# Patient Record
Sex: FEMALE | Race: WHITE | NOT HISPANIC OR LATINO | Employment: UNEMPLOYED | URBAN - METROPOLITAN AREA
[De-identification: names, ages, dates, MRNs, and addresses within clinical notes are randomized per-mention and may not be internally consistent; named-entity substitution may affect disease eponyms.]

---

## 2017-01-17 ENCOUNTER — OFFICE VISIT (OUTPATIENT)
Dept: HEMATOLOGY/ONCOLOGY | Facility: CLINIC | Age: 82
End: 2017-01-17
Payer: MEDICARE

## 2017-01-17 ENCOUNTER — LAB VISIT (OUTPATIENT)
Dept: LAB | Facility: HOSPITAL | Age: 82
End: 2017-01-17
Attending: INTERNAL MEDICINE
Payer: MEDICARE

## 2017-01-17 VITALS
SYSTOLIC BLOOD PRESSURE: 130 MMHG | HEART RATE: 63 BPM | BODY MASS INDEX: 22.1 KG/M2 | DIASTOLIC BLOOD PRESSURE: 51 MMHG | RESPIRATION RATE: 18 BRPM | HEIGHT: 61 IN | WEIGHT: 117.06 LBS | TEMPERATURE: 98 F

## 2017-01-17 DIAGNOSIS — D63.1 ANEMIA IN CKD (CHRONIC KIDNEY DISEASE): ICD-10-CM

## 2017-01-17 DIAGNOSIS — I10 ESSENTIAL HYPERTENSION: ICD-10-CM

## 2017-01-17 DIAGNOSIS — D63.8 ANEMIA OF CHRONIC DISEASE: ICD-10-CM

## 2017-01-17 DIAGNOSIS — N18.9 ANEMIA IN CKD (CHRONIC KIDNEY DISEASE): ICD-10-CM

## 2017-01-17 DIAGNOSIS — D63.8 ANEMIA OF OTHER CHRONIC DISEASE: Primary | ICD-10-CM

## 2017-01-17 DIAGNOSIS — I25.9 ISCHEMIC HEART DISEASE DUE TO CORONARY ARTERY OBSTRUCTION: ICD-10-CM

## 2017-01-17 DIAGNOSIS — Z98.890 STATUS POST CAROTID ENDARTERECTOMY: ICD-10-CM

## 2017-01-17 DIAGNOSIS — I24.0 ISCHEMIC HEART DISEASE DUE TO CORONARY ARTERY OBSTRUCTION: ICD-10-CM

## 2017-01-17 LAB
ALBUMIN SERPL BCP-MCNC: 3.8 G/DL
ALP SERPL-CCNC: 82 U/L
ALT SERPL W/O P-5'-P-CCNC: <5 U/L
ANION GAP SERPL CALC-SCNC: 10 MMOL/L
AST SERPL-CCNC: 11 U/L
BASOPHILS # BLD AUTO: 0.03 K/UL
BASOPHILS NFR BLD: 0.4 %
BILIRUB SERPL-MCNC: 0.3 MG/DL
BUN SERPL-MCNC: 25 MG/DL
CALCIUM SERPL-MCNC: 9.5 MG/DL
CHLORIDE SERPL-SCNC: 102 MMOL/L
CO2 SERPL-SCNC: 28 MMOL/L
CREAT SERPL-MCNC: 1.3 MG/DL
DIFFERENTIAL METHOD: ABNORMAL
EOSINOPHIL # BLD AUTO: 0.4 K/UL
EOSINOPHIL NFR BLD: 5.9 %
ERYTHROCYTE [DISTWIDTH] IN BLOOD BY AUTOMATED COUNT: 13.8 %
EST. GFR  (AFRICAN AMERICAN): 45 ML/MIN/1.73 M^2
EST. GFR  (NON AFRICAN AMERICAN): 39 ML/MIN/1.73 M^2
GLUCOSE SERPL-MCNC: 85 MG/DL
HCT VFR BLD AUTO: 33.5 %
HGB BLD-MCNC: 11.4 G/DL
LYMPHOCYTES # BLD AUTO: 1.5 K/UL
LYMPHOCYTES NFR BLD: 21.5 %
MCH RBC QN AUTO: 29.6 PG
MCHC RBC AUTO-ENTMCNC: 34 %
MCV RBC AUTO: 87 FL
MONOCYTES # BLD AUTO: 0.7 K/UL
MONOCYTES NFR BLD: 9.7 %
NEUTROPHILS # BLD AUTO: 4.3 K/UL
NEUTROPHILS NFR BLD: 62.5 %
PLATELET # BLD AUTO: 326 K/UL
PMV BLD AUTO: 9.7 FL
POTASSIUM SERPL-SCNC: 4.1 MMOL/L
PROT SERPL-MCNC: 7.4 G/DL
RBC # BLD AUTO: 3.85 M/UL
SODIUM SERPL-SCNC: 140 MMOL/L
WBC # BLD AUTO: 6.8 K/UL

## 2017-01-17 PROCEDURE — 99213 OFFICE O/P EST LOW 20 MIN: CPT | Mod: S$PBB,,, | Performed by: INTERNAL MEDICINE

## 2017-01-17 PROCEDURE — 99999 PR PBB SHADOW E&M-EST. PATIENT-LVL III: CPT | Mod: PBBFAC,,, | Performed by: INTERNAL MEDICINE

## 2017-01-17 PROCEDURE — 99213 OFFICE O/P EST LOW 20 MIN: CPT | Mod: PBBFAC,PN | Performed by: INTERNAL MEDICINE

## 2017-01-17 RX ORDER — DICLOFENAC SODIUM 50 MG/1
50 TABLET, DELAYED RELEASE ORAL 2 TIMES DAILY PRN
Qty: 30 TABLET | Refills: 0 | Status: SHIPPED | OUTPATIENT
Start: 2017-01-17 | End: 2017-07-18 | Stop reason: SDUPTHER

## 2017-01-17 RX ORDER — DICLOFENAC SODIUM 50 MG/1
50 TABLET, DELAYED RELEASE ORAL 2 TIMES DAILY PRN
Qty: 30 TABLET | Refills: 2 | Status: SHIPPED | OUTPATIENT
Start: 2017-01-17 | End: 2017-07-18

## 2017-01-19 NOTE — PROGRESS NOTES
HISTORY OF PRESENT ILLNESS:  This is a 80-year-old  woman coming in for   followup.  The patient has anemia of chronic disease.  She has had   postmenopausal bleeding.  Endometrial biopsies were negative for malignancy.    She is no longer having any bleeding.  She continues to see Dr. Recinos for her   CAD and venous Doppler ultrasound scheduled by him for pain and discomfort in   the legs.  The patient states she is tired all night long because she stays   awake with her , who coughs a lot and she also has restless nights and   tends to stick on the bed long during the day.  The patient is small built,   chronically ill-appearing, with anemia of chronic disease, here for followup.of labs,  has not improved, she has DJD pain that is debilitating but she dosent want any rx    PHYSICAL EXAMINATION:  Wt Readings from Last 3 Encounters:   01/17/17 53.1 kg (117 lb 1 oz)   08/23/16 54.4 kg (120 lb)   08/11/16 54.4 kg (120 lb)     Temp Readings from Last 3 Encounters:   01/17/17 98.1 °F (36.7 °C) (Oral)   08/23/16 98.2 °F (36.8 °C) (Skin)   07/15/16 98 °F (36.7 °C) (Oral)     BP Readings from Last 3 Encounters:   01/17/17 (!) 130/51   08/23/16 140/63   08/11/16 (!) 124/56     Pulse Readings from Last 3 Encounters:   01/17/17 63   08/23/16 (!) 58   08/11/16 62     GENERAL:  Chronically ill-appearing woman.  Ambulating using a walker.    Comfortable looking patient.  Patient is in no distress.  Awake, alert and   oriented to time, person and place.  No anxiety, or agitation.  HEENT:  Normal   conjunctivae and eyelids.  WNL.  PERRLA 3 to 4 mm.  No icterus, no pallor, no   congestion, and no discharge noted.   NECK:  Supple.  Trachea is central.  No crepitus.  No JVD or masses.  RESPIRATORY:  No intercostal retractions.  No dullness to percussion.  Chest is   clear to auscultation.  No rales, rhonchi or wheezes.  No crepitus.  Good air   entry bilaterally.  CARDIOVASCULAR:  S1 and S2 are normally heard  without murmurs or gallops.  All   peripheral pulses are present.  ABDOMEN:  Normal abdomen.  No hepatosplenomegaly.  No free fluid.  Bowel sounds   are present.  No hernia noted.  No masses.  No rebound or tenderness.  No   guarding or rigidity.  Umbilicus is midline.  NEUROLOGIC:  Higher functions are appropriate.  No cranial nerve deficits.    Normal gait.  Normal strength.  Motor and sensory functions are normal.   .    LABORATORY DATA:    Lab Results   Component Value Date    WBC 6.80 01/17/2017    HGB 11.4 (L) 01/17/2017    HCT 33.5 (L) 01/17/2017    MCV 87 01/17/2017     01/17/2017     CMP  Sodium   Date Value Ref Range Status   01/17/2017 140 136 - 145 mmol/L Final     Potassium   Date Value Ref Range Status   01/17/2017 4.1 3.5 - 5.1 mmol/L Final     Chloride   Date Value Ref Range Status   01/17/2017 102 95 - 110 mmol/L Final     CO2   Date Value Ref Range Status   01/17/2017 28 23 - 29 mmol/L Final     Glucose   Date Value Ref Range Status   01/17/2017 85 70 - 110 mg/dL Final     BUN, Bld   Date Value Ref Range Status   01/17/2017 25 (H) 8 - 23 mg/dL Final     Creatinine   Date Value Ref Range Status   01/17/2017 1.3 0.5 - 1.4 mg/dL Final     Calcium   Date Value Ref Range Status   01/17/2017 9.5 8.7 - 10.5 mg/dL Final     Total Protein   Date Value Ref Range Status   01/17/2017 7.4 6.0 - 8.4 g/dL Final     Albumin   Date Value Ref Range Status   01/17/2017 3.8 3.5 - 5.2 g/dL Final     Total Bilirubin   Date Value Ref Range Status   01/17/2017 0.3 0.1 - 1.0 mg/dL Final     Comment:     For infants and newborns, interpretation of results should be based  on gestational age, weight and in agreement with clinical  observations.  Premature Infant recommended reference ranges:  Up to 24 hours.............<8.0 mg/dL  Up to 48 hours............<12.0 mg/dL  3-5 days..................<15.0 mg/dL  6-29 days.................<15.0 mg/dL       Alkaline Phosphatase   Date Value Ref Range Status   01/17/2017 82  55 - 135 U/L Final     AST   Date Value Ref Range Status   01/17/2017 11 10 - 40 U/L Final     ALT   Date Value Ref Range Status   01/17/2017 <5 (L) 10 - 44 U/L Final     Anion Gap   Date Value Ref Range Status   01/17/2017 10 8 - 16 mmol/L Final     eGFR if    Date Value Ref Range Status   01/17/2017 45 (A) >60 mL/min/1.73 m^2 Final     eGFR if non    Date Value Ref Range Status   01/17/2017 39 (A) >60 mL/min/1.73 m^2 Final     Comment:     Calculation used to obtain the estimated glomerular filtration  rate (eGFR) is the CKD-EPI equation. Since race is unknown   in our information system, the eGFR values for   -American and Non--American patients are given   for each creatinine result.       PLAN:  Continue to monitor,hgb stable.    Rest of anemia workup has been   negative so far and only a bone marrow has not been performed yet.  We will   continue to observe only as the numbers are improving.  Return to clinic in five   to six months with CBC and CMP.

## 2017-01-30 RX ORDER — DEXLANSOPRAZOLE 30 MG/1
CAPSULE, DELAYED RELEASE ORAL
Qty: 30 CAPSULE | Refills: 6 | Status: SHIPPED | OUTPATIENT
Start: 2017-01-30 | End: 2017-04-12 | Stop reason: SDUPTHER

## 2017-02-20 DIAGNOSIS — H91.93 BILATERAL HEARING LOSS, UNSPECIFIED HEARING LOSS TYPE: Primary | ICD-10-CM

## 2017-02-21 ENCOUNTER — OFFICE VISIT (OUTPATIENT)
Dept: OTOLARYNGOLOGY | Facility: CLINIC | Age: 82
End: 2017-02-21
Payer: MEDICARE

## 2017-02-21 ENCOUNTER — CLINICAL SUPPORT (OUTPATIENT)
Dept: AUDIOLOGY | Facility: CLINIC | Age: 82
End: 2017-02-21
Payer: MEDICARE

## 2017-02-21 VITALS
DIASTOLIC BLOOD PRESSURE: 48 MMHG | BODY MASS INDEX: 22.1 KG/M2 | HEART RATE: 56 BPM | SYSTOLIC BLOOD PRESSURE: 138 MMHG | HEIGHT: 61 IN | WEIGHT: 117.06 LBS

## 2017-02-21 DIAGNOSIS — H93.13 TINNITUS, BILATERAL: ICD-10-CM

## 2017-02-21 DIAGNOSIS — H90.3 BILATERAL SENSORINEURAL HEARING LOSS: Primary | ICD-10-CM

## 2017-02-21 DIAGNOSIS — H90.3 BILATERAL HIGH FREQUENCY SENSORINEURAL HEARING LOSS: Primary | ICD-10-CM

## 2017-02-21 PROCEDURE — 92557 COMPREHENSIVE HEARING TEST: CPT | Mod: PBBFAC,PO | Performed by: AUDIOLOGIST-HEARING AID FITTER

## 2017-02-21 PROCEDURE — 99213 OFFICE O/P EST LOW 20 MIN: CPT | Mod: PBBFAC,PO | Performed by: NURSE PRACTITIONER

## 2017-02-21 PROCEDURE — 99213 OFFICE O/P EST LOW 20 MIN: CPT | Mod: S$PBB,,, | Performed by: NURSE PRACTITIONER

## 2017-02-21 PROCEDURE — 92567 TYMPANOMETRY: CPT | Mod: PBBFAC,PO | Performed by: AUDIOLOGIST-HEARING AID FITTER

## 2017-02-21 PROCEDURE — 99999 PR PBB SHADOW E&M-EST. PATIENT-LVL III: CPT | Mod: PBBFAC,,, | Performed by: NURSE PRACTITIONER

## 2017-02-21 NOTE — PROGRESS NOTES
Audiological evaluation completed per order from Diana Bess due to pt c/o hearing loss x2 yrs. Pt also reported a family Hx of hearing loss as her mother wore hearing aids. Pt also has occasional tinnitus AU. Results today indicate bilateral moderate sloping to severe SNHL with good discrim AU. Bilateral type A (normal) tymps were obtained.    Audiogram results were reviewed in detail with patient and all questions were answered. Recommend binaural amplification pending medical clearance, annual hearing tests to monitor hearing loss and hearing protection in loud noise. Pt was given information for hearing aid assistance.

## 2017-02-21 NOTE — PROGRESS NOTES
Subjective:       Patient ID: Sobeida Mcclain is a 81 y.o. female.    Chief Complaint: Hearing Loss    HPI   Patient reports decline in hearing over the past 2 years.  Occasional tinnitus.  Her mother wore hearing aids.  No significant noise exposure.  No otologic history of surgery or trauma.  No other ENT symptoms or concerns at this time.    Review of Systems   Constitutional: Negative.    HENT: Positive for hearing loss and tinnitus.    Eyes: Negative.    Respiratory: Negative.    Cardiovascular: Negative.    Gastrointestinal: Negative.    Musculoskeletal: Negative.    Skin: Negative.    Neurological: Negative.    Hematological: Negative.    Psychiatric/Behavioral: Negative.        Objective:      Physical Exam   Constitutional: She is oriented to person, place, and time. Vital signs are normal. She appears well-developed and well-nourished. She is cooperative. She does not appear ill. No distress.   HENT:   Head: Normocephalic and atraumatic.   Right Ear: Hearing, tympanic membrane, external ear and ear canal normal. Tympanic membrane is not erythematous. No middle ear effusion.   Left Ear: Hearing, tympanic membrane, external ear and ear canal normal. Tympanic membrane is not erythematous.  No middle ear effusion.   Nose: Nose normal. No mucosal edema or rhinorrhea. Right sinus exhibits no maxillary sinus tenderness and no frontal sinus tenderness. Left sinus exhibits no maxillary sinus tenderness and no frontal sinus tenderness.   Mouth/Throat: Uvula is midline, oropharynx is clear and moist and mucous membranes are normal. Mucous membranes are not pale, not dry and not cyanotic. She has dentures. No oral lesions. No oropharyngeal exudate, posterior oropharyngeal edema or posterior oropharyngeal erythema.   Eyes: Conjunctivae, EOM and lids are normal. Pupils are equal, round, and reactive to light. Right eye exhibits no discharge. Left eye exhibits no discharge. No scleral icterus.   Neck: Trachea normal and  normal range of motion. Neck supple. No tracheal deviation present. No thyroid mass and no thyromegaly present.   Cardiovascular: Normal rate.    Pulmonary/Chest: Effort normal. No stridor. No respiratory distress. She has no wheezes.   Musculoskeletal: Normal range of motion.   Lymphadenopathy:        Head (right side): No submental, no submandibular, no tonsillar, no preauricular and no posterior auricular adenopathy present.        Head (left side): No submental, no submandibular, no tonsillar, no preauricular and no posterior auricular adenopathy present.     She has no cervical adenopathy.        Right cervical: No superficial cervical and no posterior cervical adenopathy present.       Left cervical: No superficial cervical and no posterior cervical adenopathy present.   Neurological: She is alert and oriented to person, place, and time. She has normal strength. Coordination and gait normal.   Skin: Skin is warm, dry and intact. No lesion and no rash noted. She is not diaphoretic. No cyanosis. No pallor.   Psychiatric: She has a normal mood and affect. Her speech is normal and behavior is normal. Judgment and thought content normal. Cognition and memory are normal.   Nursing note and vitals reviewed.      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   Today's audiogram reveals the patient is a candidate for amplification. Audiogram is reviewed in detail with the patient. The audiologist's recommendation that the patient have amplification/hearing aids is discussed and questions answered. Patient has been given information by the audiologist on how to schedule a hearing aid consultation. Patient is encouraged to wear ear protection in loud noise and return annually for hearing test. Return to clinic as needed for further ENT concerns.

## 2017-03-26 RX ORDER — SERTRALINE HYDROCHLORIDE 50 MG/1
TABLET, FILM COATED ORAL
Qty: 30 TABLET | Refills: 5 | Status: SHIPPED | OUTPATIENT
Start: 2017-03-26 | End: 2017-09-09 | Stop reason: SDUPTHER

## 2017-04-12 DIAGNOSIS — K21.9 GASTROESOPHAGEAL REFLUX DISEASE, ESOPHAGITIS PRESENCE NOT SPECIFIED: Primary | ICD-10-CM

## 2017-04-13 RX ORDER — DEXLANSOPRAZOLE 30 MG/1
30 CAPSULE, DELAYED RELEASE ORAL
Qty: 90 CAPSULE | Refills: 2 | Status: SHIPPED | OUTPATIENT
Start: 2017-04-13 | End: 2017-04-20 | Stop reason: SDUPTHER

## 2017-04-20 DIAGNOSIS — K21.9 GASTROESOPHAGEAL REFLUX DISEASE, ESOPHAGITIS PRESENCE NOT SPECIFIED: ICD-10-CM

## 2017-04-21 RX ORDER — DEXLANSOPRAZOLE 30 MG/1
30 CAPSULE, DELAYED RELEASE ORAL
Qty: 90 CAPSULE | Refills: 2 | Status: SHIPPED | OUTPATIENT
Start: 2017-04-21 | End: 2018-02-21 | Stop reason: SDUPTHER

## 2017-04-21 RX ORDER — FERROUS SULFATE 325(65) MG
TABLET ORAL
Qty: 30 TABLET | Refills: 3 | Status: SHIPPED | OUTPATIENT
Start: 2017-04-21 | End: 2017-07-18 | Stop reason: SDUPTHER

## 2017-05-22 ENCOUNTER — NURSE TRIAGE (OUTPATIENT)
Dept: ADMINISTRATIVE | Facility: CLINIC | Age: 82
End: 2017-05-22

## 2017-05-22 NOTE — TELEPHONE ENCOUNTER
Reason for Disposition   MILD difficulty breathing (e.g., minimal/no SOB at rest, SOB with walking, pulse < 100) of new onset or worse than normal    Protocols used: ST BREATHING DIFFICULTY-A-OH    Call transferred.  Pt calling with concerns of shortness of breath, legs and back pain.  Attempt to schedule appointment today, no appointments available.  Pt advised to go to the ED.

## 2017-06-02 ENCOUNTER — TELEPHONE (OUTPATIENT)
Dept: FAMILY MEDICINE | Facility: CLINIC | Age: 82
End: 2017-06-02

## 2017-06-02 NOTE — TELEPHONE ENCOUNTER
----- Message from Shivani Dunn sent at 6/2/2017  3:17 PM CDT -----  Contact: [facility]  Patient is requesting Lidocaine ointment, 5% prescription to go to [facility]. Please call [facility] at [phone]

## 2017-06-02 NOTE — TELEPHONE ENCOUNTER
Called pharmacy and jacey states that pt was offered a knee brace through their company and the also offered pt lidocaine ointment. Jacey states she will fax a form. Informed her that pt has never been on that medication

## 2017-06-27 ENCOUNTER — PATIENT OUTREACH (OUTPATIENT)
Dept: ADMINISTRATIVE | Facility: HOSPITAL | Age: 82
End: 2017-06-27

## 2017-06-27 ENCOUNTER — TELEPHONE (OUTPATIENT)
Dept: HEMATOLOGY/ONCOLOGY | Facility: CLINIC | Age: 82
End: 2017-06-27

## 2017-06-27 NOTE — PROGRESS NOTES
HTN registry, over 1 year.  Called pt to inform:    Due for your fasting cholesterol labs, osteoporosis screening, and possibly some immunizations (pneumonia, shingles, and tetanus)

## 2017-06-27 NOTE — LETTER
July 5, 2017    Sobeida Mcclain  [id number] [address] [address]             [facility]  [address]  [facility] [address]  Phone: [phone] Dear Mrs. Mcclain:    We have tried to reach you by mychart unsuccessfully.    [facility] is committed to your overall health.  To help you get the most out of each of your visits, we will review your information to make sure you are up to date on all of your recommended tests and/or procedures.       We have Dr. Mal Bernstein listed as your primary care provider.  He has found that you may be due for your fasting cholesterol labs, osteoporosis screening, and possibly some immunizations (pneumonia, shingles, and tetanus).     If you have had any of the above done at an outside facility, please let us know so I can update your record.  If you have a copy of these records, please provide a copy for us to scan into your chart.  If not, please provide that provider/facilities contact information so that we may obtain copies from that facility.     Otherwise, please schedule these appointments at your earliest convenience.  You are due for your annual follow up with Dr. Bernstein before August of 2017.     If you have any questions or concerns, please don't hesitate to call.    Thank you for letting us care for you,  Rebecca Choudhary LPN Clinical Care Coordinator  [facility]  [phone]

## 2017-06-27 NOTE — TELEPHONE ENCOUNTER
----- Message from Jacey Dixon sent at 6/27/2017  2:53 PM CDT -----  Contact: daughter-Purvi Long  Needs to reschedule 7/18 lab and dr ocampo to Wednesday 7/19.  Please call back at

## 2017-07-10 ENCOUNTER — TELEPHONE (OUTPATIENT)
Dept: HEMATOLOGY/ONCOLOGY | Facility: CLINIC | Age: 82
End: 2017-07-10

## 2017-07-10 NOTE — TELEPHONE ENCOUNTER
Returned call to daughter, [place], who wanted to know why her mother's lab and follow up on 7/18/17. In further discussion, there apparently was some miscommunication during the last phone conversation with another staff nurse and the appointments were cancelled. Rescheduled lab and follow up on 7/18/17 as before, lab at 9:30 am and follow up at 10:20 am as another daughter had rearranged her schedule to bring patient to those appointments. Daughter, Virginia, voiced understanding and appreciation.

## 2017-07-10 NOTE — TELEPHONE ENCOUNTER
----- Message from Jayson MOTA Frisard sent at 7/10/2017  8:49 AM CDT -----  Contact: Ml/Purvi Loja called in regarding the attached patient (mom-Sobeida) and wanted to know why the appts on Tuesday 7/18/17 were cancelled for the patient? Virginia would like a call back at [phone]

## 2017-07-18 ENCOUNTER — TELEPHONE (OUTPATIENT)
Dept: HEMATOLOGY/ONCOLOGY | Facility: CLINIC | Age: 82
End: 2017-07-18

## 2017-07-18 ENCOUNTER — LAB VISIT (OUTPATIENT)
Dept: LAB | Facility: HOSPITAL | Age: 82
End: 2017-07-18
Attending: INTERNAL MEDICINE
Payer: MEDICARE

## 2017-07-18 ENCOUNTER — OFFICE VISIT (OUTPATIENT)
Dept: HEMATOLOGY/ONCOLOGY | Facility: CLINIC | Age: 82
End: 2017-07-18
Payer: MEDICARE

## 2017-07-18 VITALS
HEART RATE: 59 BPM | WEIGHT: 115.5 LBS | TEMPERATURE: 98 F | SYSTOLIC BLOOD PRESSURE: 127 MMHG | RESPIRATION RATE: 16 BRPM | HEIGHT: 61 IN | BODY MASS INDEX: 21.81 KG/M2 | DIASTOLIC BLOOD PRESSURE: 55 MMHG

## 2017-07-18 DIAGNOSIS — D63.1 ANEMIA IN STAGE 3 CHRONIC KIDNEY DISEASE: Primary | ICD-10-CM

## 2017-07-18 DIAGNOSIS — N18.30 ANEMIA IN STAGE 3 CHRONIC KIDNEY DISEASE: Primary | ICD-10-CM

## 2017-07-18 DIAGNOSIS — D51.8 OTHER VITAMIN B12 DEFICIENCY ANEMIA: ICD-10-CM

## 2017-07-18 DIAGNOSIS — D63.8 ANEMIA OF OTHER CHRONIC DISEASE: ICD-10-CM

## 2017-07-18 DIAGNOSIS — I10 ESSENTIAL HYPERTENSION: ICD-10-CM

## 2017-07-18 LAB
ALBUMIN SERPL BCP-MCNC: 4.3 G/DL
ALP SERPL-CCNC: 90 U/L
ALT SERPL W/O P-5'-P-CCNC: 6 U/L
ANION GAP SERPL CALC-SCNC: 9 MMOL/L
AST SERPL-CCNC: 16 U/L
BASOPHILS # BLD AUTO: 0.04 K/UL
BASOPHILS NFR BLD: 0.6 %
BILIRUB SERPL-MCNC: 0.6 MG/DL
BUN SERPL-MCNC: 22 MG/DL
CALCIUM SERPL-MCNC: 9.8 MG/DL
CHLORIDE SERPL-SCNC: 103 MMOL/L
CO2 SERPL-SCNC: 28 MMOL/L
CREAT SERPL-MCNC: 1.23 MG/DL
DIFFERENTIAL METHOD: ABNORMAL
EOSINOPHIL # BLD AUTO: 0.5 K/UL
EOSINOPHIL NFR BLD: 6.6 %
ERYTHROCYTE [DISTWIDTH] IN BLOOD BY AUTOMATED COUNT: 13.4 %
EST. GFR  (AFRICAN AMERICAN): 48 ML/MIN/1.73 M^2
EST. GFR  (NON AFRICAN AMERICAN): 42 ML/MIN/1.73 M^2
GLUCOSE SERPL-MCNC: 98 MG/DL
HCT VFR BLD AUTO: 34 %
HGB BLD-MCNC: 11.2 G/DL
LYMPHOCYTES # BLD AUTO: 1.7 K/UL
LYMPHOCYTES NFR BLD: 24.5 %
MCH RBC QN AUTO: 30.6 PG
MCHC RBC AUTO-ENTMCNC: 32.9 %
MCV RBC AUTO: 93 FL
MONOCYTES # BLD AUTO: 0.6 K/UL
MONOCYTES NFR BLD: 8.7 %
NEUTROPHILS # BLD AUTO: 4.1 K/UL
NEUTROPHILS NFR BLD: 59.6 %
NRBC BLD-RTO: 0 /100 WBC
PLATELET # BLD AUTO: 288 K/UL
PMV BLD AUTO: 9.6 FL
POTASSIUM SERPL-SCNC: 3.9 MMOL/L
PROT SERPL-MCNC: 7.7 G/DL
RBC # BLD AUTO: 3.66 M/UL
SODIUM SERPL-SCNC: 140 MMOL/L
WBC # BLD AUTO: 6.87 K/UL

## 2017-07-18 PROCEDURE — 99999 PR PBB SHADOW E&M-EST. PATIENT-LVL III: CPT | Mod: PBBFAC,,, | Performed by: INTERNAL MEDICINE

## 2017-07-18 PROCEDURE — 80053 COMPREHEN METABOLIC PANEL: CPT

## 2017-07-18 PROCEDURE — 99213 OFFICE O/P EST LOW 20 MIN: CPT | Mod: PBBFAC,PN | Performed by: INTERNAL MEDICINE

## 2017-07-18 PROCEDURE — 99214 OFFICE O/P EST MOD 30 MIN: CPT | Mod: S$PBB,,, | Performed by: INTERNAL MEDICINE

## 2017-07-18 PROCEDURE — 1159F MED LIST DOCD IN RCRD: CPT | Mod: ,,, | Performed by: INTERNAL MEDICINE

## 2017-07-18 PROCEDURE — 85025 COMPLETE CBC W/AUTO DIFF WBC: CPT

## 2017-07-18 PROCEDURE — 1125F AMNT PAIN NOTED PAIN PRSNT: CPT | Mod: ,,, | Performed by: INTERNAL MEDICINE

## 2017-07-18 NOTE — PROGRESS NOTES
HISTORY OF PRESENT ILLNESS:  This is a 80-year-old  woman coming in for   followup.  The patient has anemia of chronic disease.  She has had   postmenopausal bleeding.  Endometrial biopsies were negative for malignancy.    She is no longer having any bleeding.  She continues to see Dr. Recinos for her CAD.    The patient states she is tired all night long because she stays   awake with her , who coughs a lot and she also has restless nights and   tends to stick on the bed long during the day.  The patient is small built,   chronically ill-appearing, with anemia of chronic disease, here for followup.of labs, had stents in her heart and leg by Dr. Recinos, pt is feeling much more active, she has DJD pain that is debilitating but she dosent want any rx.       PHYSICAL EXAMINATION:  Wt Readings from Last 3 Encounters:   02/21/17 53.1 kg (117 lb 1 oz)   01/17/17 53.1 kg (117 lb 1 oz)   08/23/16 54.4 kg (120 lb)     Temp Readings from Last 3 Encounters:   01/17/17 98.1 °F (36.7 °C) (Oral)   08/23/16 98.2 °F (36.8 °C) (Skin)   07/15/16 98 °F (36.7 °C) (Oral)     BP Readings from Last 3 Encounters:   02/21/17 (!) 138/48   01/17/17 (!) 130/51   08/23/16 140/63     Pulse Readings from Last 3 Encounters:   02/21/17 (!) 56   01/17/17 63   08/23/16 (!) 58     GENERAL:  Chronically ill-appearing woman.  Ambulating using a walker.    Comfortable looking patient.  Patient is in no distress.  Awake, alert and   oriented to time, person and place.  No anxiety, or agitation.  HEENT:  Normal   conjunctivae and eyelids.  WNL.  PERRLA 3 to 4 mm.  No icterus, no pallor, no   congestion, and no discharge noted.   NECK:  Supple.  Trachea is central.  No crepitus.  No JVD or masses.  RESPIRATORY:  No intercostal retractions.  No dullness to percussion.  Chest is   clear to auscultation.  No rales, rhonchi or wheezes.  No crepitus.  Good air   entry bilaterally.  CARDIOVASCULAR:  S1 and S2 are normally heard without murmurs or  gallops.  All   peripheral pulses are present.  ABDOMEN:  Normal abdomen.  No hepatosplenomegaly.  No free fluid.  Bowel sounds   are present.  No hernia noted.  No masses.  No rebound or tenderness.  No   guarding or rigidity.  Umbilicus is midline.  NEUROLOGIC:  Higher functions are appropriate.  No cranial nerve deficits.    Normal gait.  Normal strength.  Motor and sensory functions are normal.   .    LABORATORY DATA:    Lab Results   Component Value Date    WBC 6.87 07/18/2017    HGB 11.2 (L) 07/18/2017    HCT 34.0 (L) 07/18/2017    MCV 93 07/18/2017     07/18/2017     CMP  Sodium   Date Value Ref Range Status   07/18/2017 140 136 - 145 mmol/L Final     Potassium   Date Value Ref Range Status   07/18/2017 3.9 3.5 - 5.1 mmol/L Final     Chloride   Date Value Ref Range Status   07/18/2017 103 95 - 110 mmol/L Final     CO2   Date Value Ref Range Status   07/18/2017 28 22 - 31 mmol/L Final     Glucose   Date Value Ref Range Status   07/18/2017 98 70 - 110 mg/dL Final     Comment:     The ADA recommends the following guidelines for fasting glucose:  Normal:       less than 100 mg/dL  Prediabetes:  100 mg/dL to 125 mg/dL  Diabetes:     126 mg/dL or higher       BUN, Bld   Date Value Ref Range Status   07/18/2017 22 (H) 7 - 18 mg/dL Final     Creatinine   Date Value Ref Range Status   07/18/2017 1.23 0.50 - 1.40 mg/dL Final     Calcium   Date Value Ref Range Status   07/18/2017 9.8 8.4 - 10.2 mg/dL Final     Total Protein   Date Value Ref Range Status   07/18/2017 7.7 6.0 - 8.4 g/dL Final     Albumin   Date Value Ref Range Status   07/18/2017 4.3 3.5 - 5.2 g/dL Final     Total Bilirubin   Date Value Ref Range Status   07/18/2017 0.6 0.2 - 1.3 mg/dL Final     Comment:     For infants and newborns, interpretation of results should be based  on gestational age, weight and in agreement with clinical  observations.  Premature Infant recommended reference ranges:  Up to 24 hours.............<8.0 mg/dL  Up to 48  hours............<12.0 mg/dL  3-5 days..................<15.0 mg/dL  6-29 days.................<15.0 mg/dL       Alkaline Phosphatase   Date Value Ref Range Status   07/18/2017 90 38 - 145 U/L Final     AST   Date Value Ref Range Status   07/18/2017 16 14 - 36 U/L Final     ALT   Date Value Ref Range Status   07/18/2017 6 (L) 10 - 44 U/L Final     Anion Gap   Date Value Ref Range Status   07/18/2017 9 8 - 16 mmol/L Final     eGFR if    Date Value Ref Range Status   07/18/2017 48 (A) >60 mL/min/1.73 m^2 Final     eGFR if non    Date Value Ref Range Status   07/18/2017 42 (A) >60 mL/min/1.73 m^2 Final     Comment:     Calculation used to obtain the estimated glomerular filtration  rate (eGFR) is the CKD-EPI equation. Since race is unknown   in our information system, the eGFR values for   -American and Non--American patients are given   for each creatinine result.       PLAN:  Continue to monitor,hgb stable.    Rest of anemia workup has been   negative so far and only a bone marrow has not been performed yet.  We will   continue to observe only as the numbers are improving.  Return to clinic in five   to six months with CBC and CMP.  Mild CKD :stable rtc 6-8 months again for clinical f/u

## 2017-07-18 NOTE — TELEPHONE ENCOUNTER
----- Message from Rhina Neri sent at 7/18/2017  3:07 PM CDT -----  Contact: Daughter  Felecia, daughter [phone], Calling to discuss the referral for , sleeping issues, so patient will be able to sleep at night. Please advise. thanks.

## 2017-07-18 NOTE — TELEPHONE ENCOUNTER
Returned call to patient's daughter, Felecia, who stated that Dr. Steven offered to give referral for patient's  to see someone as  does not sleep at night, therefore keeps patient up at night and patient does not get any sleep. Daughter would like Dr. Steven's recommendation.  (Manny Mcclain  1934) has appointment with neurologist on 2017, Dr. Healy at 1 pm. I strongly urged Felecia to keep that appointment and will call her back if Dr. Steven has any further recommendation. Felecia voiced understanding and appreciation.

## 2017-07-25 ENCOUNTER — TELEPHONE (OUTPATIENT)
Dept: FAMILY MEDICINE | Facility: CLINIC | Age: 82
End: 2017-07-25

## 2017-07-25 DIAGNOSIS — I10 ESSENTIAL HYPERTENSION: Primary | ICD-10-CM

## 2017-07-25 NOTE — TELEPHONE ENCOUNTER
Please review pt's hx and advise what lab she may be due for. Will call dtr back to sched prior to appt.

## 2017-07-25 NOTE — TELEPHONE ENCOUNTER
----- Message from Jim Truong sent at 7/25/2017  1:32 PM CDT -----  Contact: [place] patient's daughter  [place] patient's daughter called with questions regarding labs before the visit? Please call back at [phone]. Thanks,

## 2017-07-27 NOTE — TELEPHONE ENCOUNTER
Spoke with Virginia (anne) and informed her that lipid orders have been put in and that pt needs to be fasting.

## 2017-08-01 ENCOUNTER — LAB VISIT (OUTPATIENT)
Dept: LAB | Facility: HOSPITAL | Age: 82
End: 2017-08-01
Attending: FAMILY MEDICINE
Payer: MEDICARE

## 2017-08-01 DIAGNOSIS — I10 ESSENTIAL HYPERTENSION: ICD-10-CM

## 2017-08-01 LAB
CHOLEST/HDLC SERPL: 5.4 {RATIO}
HDL/CHOLESTEROL RATIO: 18.5 %
HDLC SERPL-MCNC: 205 MG/DL
HDLC SERPL-MCNC: 38 MG/DL
LDLC SERPL CALC-MCNC: 141.4 MG/DL
NONHDLC SERPL-MCNC: 167 MG/DL
TRIGL SERPL-MCNC: 128 MG/DL

## 2017-08-01 PROCEDURE — 36415 COLL VENOUS BLD VENIPUNCTURE: CPT | Mod: PO

## 2017-08-01 PROCEDURE — 80061 LIPID PANEL: CPT

## 2017-08-09 ENCOUNTER — OFFICE VISIT (OUTPATIENT)
Dept: FAMILY MEDICINE | Facility: CLINIC | Age: 82
End: 2017-08-09
Payer: MEDICARE

## 2017-08-09 VITALS
SYSTOLIC BLOOD PRESSURE: 120 MMHG | HEIGHT: 61 IN | TEMPERATURE: 99 F | BODY MASS INDEX: 21.91 KG/M2 | WEIGHT: 116.06 LBS | DIASTOLIC BLOOD PRESSURE: 72 MMHG

## 2017-08-09 DIAGNOSIS — I24.0 ISCHEMIC HEART DISEASE DUE TO CORONARY ARTERY OBSTRUCTION: ICD-10-CM

## 2017-08-09 DIAGNOSIS — D63.8 ANEMIA, CHRONIC DISEASE: ICD-10-CM

## 2017-08-09 DIAGNOSIS — I10 ESSENTIAL HYPERTENSION: Primary | ICD-10-CM

## 2017-08-09 DIAGNOSIS — I25.9 ISCHEMIC HEART DISEASE DUE TO CORONARY ARTERY OBSTRUCTION: ICD-10-CM

## 2017-08-09 DIAGNOSIS — I73.9 PERIPHERAL VASCULAR DISEASE: ICD-10-CM

## 2017-08-09 DIAGNOSIS — R63.4 WEIGHT LOSS: ICD-10-CM

## 2017-08-09 PROCEDURE — 3008F BODY MASS INDEX DOCD: CPT | Mod: ,,, | Performed by: FAMILY MEDICINE

## 2017-08-09 PROCEDURE — 99999 PR PBB SHADOW E&M-EST. PATIENT-LVL II: CPT | Mod: PBBFAC,,, | Performed by: FAMILY MEDICINE

## 2017-08-09 PROCEDURE — 3074F SYST BP LT 130 MM HG: CPT | Mod: ,,, | Performed by: FAMILY MEDICINE

## 2017-08-09 PROCEDURE — 3078F DIAST BP <80 MM HG: CPT | Mod: ,,, | Performed by: FAMILY MEDICINE

## 2017-08-09 PROCEDURE — 1159F MED LIST DOCD IN RCRD: CPT | Mod: ,,, | Performed by: FAMILY MEDICINE

## 2017-08-09 PROCEDURE — 1125F AMNT PAIN NOTED PAIN PRSNT: CPT | Mod: ,,, | Performed by: FAMILY MEDICINE

## 2017-08-09 PROCEDURE — 99212 OFFICE O/P EST SF 10 MIN: CPT | Mod: PBBFAC,PO | Performed by: FAMILY MEDICINE

## 2017-08-09 PROCEDURE — 99214 OFFICE O/P EST MOD 30 MIN: CPT | Mod: S$PBB,,, | Performed by: FAMILY MEDICINE

## 2017-08-09 NOTE — PROGRESS NOTES
Subjective:       Patient ID: Sobeida Mcclain is a 82 y.o. female.    Chief Complaint: Knee Pain (L knee pain. Advised by a [employer] company to see PCP for knee brace eval.)    Mrs Mcclain, 81y/o WF, PMH of CKD, Peripheral vascular disease, CAD s/p stents, anemia of chronic disease, shoulder pain, HTN presents with physical check up b/c she received a message from [facility] which required her to see a doctor before a certain date.     CAD/PVD - denies changes in chest pain, SOB, leg pain from baseline. Compliant with aspirin and plavix. No side effects.   Reports that U/S of leg in July from outside hospital was clear of clots.     Anemia of chronic disease - labs shows chronic anemia. She denies baseline changes in fatigue. Has been compliant with iron and cyanocolabamin. No side effects. She is being followed by dermatology.    HTN - well controlled, 120/72. On Dyazide, no side effects.     Reports weight loss. Records show 4 pounds from her maximum of 120lbs to 116. Denies excessive gas, blood in stool, bowel changes, abdominal pain. Denies hematemesis. Occasional coughing during interview.     Her daughter was present. They report that her  is on a wheel chair and she is overwhelmed with stress taking care of him.    Offered to check her labs and further workup if her symptoms continue. She refuses.       Knee Pain        Review of Systems   Constitutional: Positive for fatigue and unexpected weight change. Negative for appetite change, chills, diaphoresis and fever.   HENT: Negative for congestion, ear discharge, facial swelling and hearing loss.    Eyes: Negative for discharge and redness.   Respiratory: Negative for apnea, choking, chest tightness, shortness of breath, wheezing and stridor.    Cardiovascular: Negative for chest pain and palpitations.   Gastrointestinal: Negative for abdominal distention, abdominal pain, constipation, diarrhea, nausea, rectal pain and vomiting.   Endocrine: Negative for cold  "intolerance, heat intolerance and polydipsia.   Genitourinary: Negative for difficulty urinating and dyspareunia.   Musculoskeletal: Positive for arthralgias, back pain, myalgias and neck stiffness.   Skin: Positive for rash.   Allergic/Immunologic: Negative.    Neurological: Negative for facial asymmetry.   Psychiatric/Behavioral: Negative for agitation and behavioral problems. The patient is not hyperactive.        Objective:     Blood pressure 120/72, temperature 98.5 °F (36.9 °C), temperature source Oral, height 5' 1" (1.549 m), weight 52.7 kg (116 lb 1.2 oz).      Physical Exam   Constitutional: She is oriented to person, place, and time.   Cardiovascular: Normal rate, regular rhythm and normal heart sounds.  Exam reveals no gallop and no friction rub.    No murmur heard.  Andreas negative   Pulmonary/Chest: Effort normal and breath sounds normal. No respiratory distress. She has no wheezes. She has no rales. She exhibits no tenderness.   Abdominal: Soft. Bowel sounds are normal.   Neurological: She is alert and oriented to person, place, and time. No cranial nerve deficit.   Gait slow, required some assistance from daughter to get on examination bed  Not shuffling, no tremors.   Skin: Skin is warm and dry. Rash noted. Rash is macular.        Psychiatric:   Required occasional redirection. Mild tangentiality and loss of focus.       Assessment:       1. Essential hypertension    2. 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 Peripheral vascular disease    4. Anemia, chronic disease    5. Weight loss        Plan:       1. Well controlled on Dyazide 37.5-25. Continue.     2,3. Nonsymptomatic and compliant on aspirin/plavix, Continue.  4. No change in symptoms from baseline, no evidence of acute bleeding. Continue Iron and Cyanocobalamin.   5. Mild. Consider neoplastic etiology if it continues.   "

## 2017-09-10 RX ORDER — SERTRALINE HYDROCHLORIDE 50 MG/1
TABLET, FILM COATED ORAL
Qty: 30 TABLET | Refills: 5 | Status: SHIPPED | OUTPATIENT
Start: 2017-09-10 | End: 2018-04-02 | Stop reason: SDUPTHER

## 2017-09-11 RX ORDER — FERROUS SULFATE 325(65) MG
TABLET ORAL
Qty: 30 TABLET | Refills: 3 | Status: SHIPPED | OUTPATIENT
Start: 2017-09-11 | End: 2017-11-08 | Stop reason: SDUPTHER

## 2017-11-08 ENCOUNTER — OFFICE VISIT (OUTPATIENT)
Dept: FAMILY MEDICINE | Facility: CLINIC | Age: 82
End: 2017-11-08
Payer: MEDICARE

## 2017-11-08 ENCOUNTER — TELEPHONE (OUTPATIENT)
Dept: FAMILY MEDICINE | Facility: CLINIC | Age: 82
End: 2017-11-08

## 2017-11-08 VITALS
DIASTOLIC BLOOD PRESSURE: 60 MMHG | SYSTOLIC BLOOD PRESSURE: 102 MMHG | HEIGHT: 61 IN | HEART RATE: 64 BPM | TEMPERATURE: 98 F | WEIGHT: 114 LBS | BODY MASS INDEX: 21.52 KG/M2

## 2017-11-08 DIAGNOSIS — M25.569 KNEE PAIN, UNSPECIFIED CHRONICITY, UNSPECIFIED LATERALITY: ICD-10-CM

## 2017-11-08 DIAGNOSIS — M25.562 CHRONIC PAIN OF LEFT KNEE: ICD-10-CM

## 2017-11-08 DIAGNOSIS — R26.9 ABNORMALITY OF GAIT: Primary | ICD-10-CM

## 2017-11-08 DIAGNOSIS — J06.9 UPPER RESPIRATORY TRACT INFECTION, UNSPECIFIED TYPE: ICD-10-CM

## 2017-11-08 DIAGNOSIS — G89.29 CHRONIC PAIN OF LEFT KNEE: ICD-10-CM

## 2017-11-08 DIAGNOSIS — R26.9 ABNORMALITY OF GAIT AND MOBILITY: ICD-10-CM

## 2017-11-08 DIAGNOSIS — R51.9 NONINTRACTABLE HEADACHE, UNSPECIFIED CHRONICITY PATTERN, UNSPECIFIED HEADACHE TYPE: Primary | ICD-10-CM

## 2017-11-08 PROCEDURE — 99213 OFFICE O/P EST LOW 20 MIN: CPT | Mod: PBBFAC,PN | Performed by: INTERNAL MEDICINE

## 2017-11-08 PROCEDURE — 99999 PR PBB SHADOW E&M-EST. PATIENT-LVL III: CPT | Mod: PBBFAC,,, | Performed by: INTERNAL MEDICINE

## 2017-11-08 PROCEDURE — 99214 OFFICE O/P EST MOD 30 MIN: CPT | Mod: S$PBB,,, | Performed by: INTERNAL MEDICINE

## 2017-11-08 NOTE — PROGRESS NOTES
"Assessment and Plan:    1. Nonintractable headache, unspecified chronicity pattern, unspecified headache type  Suspect that with other symptoms of cough, sore throat, and ear pressure and popping sensation (no signs of ear infections), this headache is related to a viral syndrome. No focal neuro concerns. Recommended tylenol and OTC supportive measures for URI.     2. Upper respiratory tract infection, unspecified type    3. Knee pain, unspecified chronicity, unspecified laterality  Known arthritis failed many treatments in the past. Recommended tylenol, ice, using the knee brace.    4. Abnormality of gait and mobility  Patient concerned about falls, does not think current walked is adequate. Will refer to PT for gait eval for safety and recommendations. Advised her to bring brace and walked to appointment.  - Ambulatory Referral to Physical/Occupational Therapy        ______________________________________________________________________  Subjective:    Chief Complaint:  Headaches, knee pain    HPI:  Sobeida is a 82 y.o. year old woman here for evaluation of headaches.     She notes that she has been having intermittent headaches and hearing a popping sensation behind her right ear. This has been going on for about a week. She has tried taking 1 ibuprofen OTC sporadically (less than once a day). She also notes ear pain on the left side that started around the same time. She had been having sporadic cough and sore throat along with this. She denies fevers and chills.      She also reports knee pain. She denies having seen anyone about this before, but per chart review, knee arthritis is an ongoing problem for her. She has tried glucocorticoid as well as Synvisc injections. She has a knee brace which she is not using today. She notes that she is concerned about falling due to her knee  "giving out" from pain, so she has not been walking long distances. She has a 4-wheeled walker which she uses at home. "     Medications:  Current Outpatient Prescriptions on File Prior to Visit   Medication Sig Dispense Refill    aspirin (ECOTRIN) 81 MG EC tablet Take 81 mg by mouth once daily.      clopidogrel (PLAVIX) 75 mg tablet Take 75 mg by mouth once daily.       cyanocobalamin 500 MCG tablet Take 1,000 mcg by mouth once daily.       dexlansoprazole (DEXILANT) 30 mg CpDM Take 1 capsule (30 mg total) by mouth before breakfast. 90 capsule 2    ferrous sulfate 325 mg (65 mg iron) Tab tablet TAKE 1 TABLET (325 MG TOTAL) BY MOUTH ONCE DAILY. 30 tablet 3    sertraline (ZOLOFT) 50 MG tablet TAKE 1 TABLET EVERY DAY 30 tablet 5    triamterene-hydrochlorothiazide 37.5-25 mg (DYAZIDE) 37.5-25 mg per capsule Take 1 capsule by mouth every morning.      [DISCONTINUED] ferrous sulfate 325 mg (65 mg iron) Tab tablet TAKE 1 TABLET (325 MG TOTAL) BY MOUTH ONCE DAILY. 30 tablet 3     No current facility-administered medications on file prior to visit.        Review of Systems:  Review of Systems   Constitutional: Negative for chills and fever.   HENT: Positive for congestion, ear pain, postnasal drip, sinus pressure and sore throat. Negative for ear discharge, sinus pain and sneezing.    Eyes: Negative for visual disturbance.   Respiratory: Positive for cough. Negative for shortness of breath and wheezing.    Cardiovascular: Negative for chest pain.   Musculoskeletal: Positive for arthralgias, gait problem and joint swelling.   Neurological: Positive for headaches. Negative for dizziness, weakness and numbness.   Psychiatric/Behavioral: The patient is nervous/anxious.        Past Medical History:  Past Medical History:   Diagnosis Date    Anticoagulant long-term use     Carotid artery disease     Coronary artery disease     chest discomfort, does not take NTG    Encounter for blood transfusion     Hypertension        Objective:    Vitals:  Vitals:    11/08/17 0814   BP: 102/60   Pulse: 64   Temp: 98.4 °F (36.9 °C)   Weight: 51.7 kg  "(113 lb 15.7 oz)   Height: 5' 1" (1.549 m)   PainSc:   4   PainLoc: Leg       Physical Exam   Constitutional: She is oriented to person, place, and time. No distress.   HENT:   Right Ear: No mastoid tenderness. Tympanic membrane is erythematous. Tympanic membrane is not injected, not retracted and not bulging. No middle ear effusion.   Left Ear: No mastoid tenderness. Tympanic membrane is erythematous. Tympanic membrane is not injected, not retracted and not bulging.  No middle ear effusion.   Mouth/Throat: Posterior oropharyngeal erythema present. No oropharyngeal exudate or posterior oropharyngeal edema. Tonsils are 0 on the right. Tonsils are 0 on the left.   Eyes: No scleral icterus.   Cardiovascular: Normal rate and regular rhythm.    No murmur heard.  Pulmonary/Chest: Effort normal and breath sounds normal. No respiratory distress. She has no wheezes.   Musculoskeletal: She exhibits no edema.   Neurological: She is alert and oriented to person, place, and time.   Skin: Skin is warm and dry. She is not diaphoretic.   Psychiatric: She has a normal mood and affect. Her behavior is normal.   Vitals reviewed.      Data:  Previous labs reviewed and pertinent for decreased GFR.      Holly Interiano MD  Internal Medicine  "

## 2017-11-08 NOTE — TELEPHONE ENCOUNTER
----- Message from Mary Jane Pappas sent at 11/8/2017  2:39 PM CST -----  Contact: margo ivey   Please call daughter on cell      Use this number only to call back     Regarding PT

## 2017-11-08 NOTE — PATIENT INSTRUCTIONS
For your knee pain and headaches, take TYLENOL (ACETAMINOPHEN) rather than ibuprofen. Medications like ibuprofen, naproxen, advil, and aleve, are not safe to take regularly when you have kidney disease.     You have an upper respiratory infection, also known as a cold. Based on my exam today, I do not think that you need antibiotics, and I do not think that they would help you. This type of infection commonly causes runny nose, nasal congestion, sinus pressure, ear pressure, sore throat, and cough.     If you are having sore throat, you can try gargling with salt water. Heat water to dissolve salt to the point where you cant dissolve any more. Cool the water back to lukewarm and gargle with this solution. This can help with swelling in your throat.    For your trouble walking, I have put in a referral to physical therapy.

## 2017-11-08 NOTE — TELEPHONE ENCOUNTER
Daughter states that pt husbands uses [facility], and would like to use them. Pt saw dr. koehler 11-8-17.

## 2017-11-08 NOTE — TELEPHONE ENCOUNTER
----- Message from Giovanna Herrera sent at 11/8/2017  1:47 PM CST -----  Please call patient in regards to requesting home health order for PT in home, [phone] ([place])

## 2018-01-08 RX ORDER — FERROUS SULFATE 325(65) MG
TABLET ORAL
Qty: 30 TABLET | Refills: 3 | Status: SHIPPED | OUTPATIENT
Start: 2018-01-08 | End: 2018-06-20 | Stop reason: SDUPTHER

## 2018-02-01 ENCOUNTER — HOSPITAL ENCOUNTER (OUTPATIENT)
Dept: RADIOLOGY | Facility: HOSPITAL | Age: 83
Discharge: HOME | End: 2018-02-01
Attending: NURSE PRACTITIONER
Payer: MEDICARE

## 2018-02-01 ENCOUNTER — OFFICE VISIT (OUTPATIENT)
Dept: PRIMARY CARE CLINIC | Facility: CLINIC | Age: 83
End: 2018-02-01
Payer: MEDICARE

## 2018-02-01 VITALS
BODY MASS INDEX: 21.61 KG/M2 | WEIGHT: 114.44 LBS | SYSTOLIC BLOOD PRESSURE: 106 MMHG | HEART RATE: 66 BPM | HEIGHT: 61 IN | OXYGEN SATURATION: 94 % | DIASTOLIC BLOOD PRESSURE: 52 MMHG | TEMPERATURE: 98 F

## 2018-02-01 DIAGNOSIS — R05.9 COUGH: ICD-10-CM

## 2018-02-01 DIAGNOSIS — R42 DIZZINESS: ICD-10-CM

## 2018-02-01 DIAGNOSIS — J06.9 URI, ACUTE: Primary | ICD-10-CM

## 2018-02-01 PROCEDURE — 71046 X-RAY EXAM CHEST 2 VIEWS: CPT | Mod: TC,FY,PO

## 2018-02-01 PROCEDURE — 99214 OFFICE O/P EST MOD 30 MIN: CPT | Mod: S$PBB,,, | Performed by: NURSE PRACTITIONER

## 2018-02-01 PROCEDURE — 1125F AMNT PAIN NOTED PAIN PRSNT: CPT | Mod: ,,, | Performed by: NURSE PRACTITIONER

## 2018-02-01 PROCEDURE — 1159F MED LIST DOCD IN RCRD: CPT | Mod: ,,, | Performed by: NURSE PRACTITIONER

## 2018-02-01 PROCEDURE — 71046 X-RAY EXAM CHEST 2 VIEWS: CPT | Mod: 26,,, | Performed by: RADIOLOGY

## 2018-02-01 PROCEDURE — 99999 PR PBB SHADOW E&M-EST. PATIENT-LVL IV: CPT | Mod: PBBFAC,,, | Performed by: NURSE PRACTITIONER

## 2018-02-01 PROCEDURE — 99214 OFFICE O/P EST MOD 30 MIN: CPT | Mod: PBBFAC,PO | Performed by: NURSE PRACTITIONER

## 2018-02-01 NOTE — PROGRESS NOTES
"Subjective:       Patient ID: Sobeida Mcclain is a 82 y.o. female.    Chief Complaint: Headache; Dizziness; and Back Pain    Patient who is new to me presents with a new problem of cough, dizziness, and headache. She states she hears a popping noise in the back of her head that is associated with the headache. She also has been having episodes of dizziness that occurs when standing up but that has been occurring for "a long time." Patient does endorse multiple stressors at home from a spouse whom is disabled from a stroke. She has a daughter and son who help with the care of her .       Cough   This is a new problem. The current episode started in the past 7 days (patient is unsure). The problem has been unchanged. The cough is non-productive. Associated symptoms include ear pain, headaches, myalgias and rhinorrhea. Pertinent negatives include no chest pain, chills, fever, heartburn, hemoptysis, nasal congestion, postnasal drip, rash, sore throat, shortness of breath, sweats, weight loss or wheezing. Associated symptoms comments: dizziness. Nothing aggravates the symptoms. She has tried nothing for the symptoms. There is no history of asthma, bronchitis, environmental allergies or pneumonia.     Review of Systems   Constitutional: Negative for chills, fatigue, fever and weight loss.   HENT: Positive for ear pain and rhinorrhea. Negative for congestion, postnasal drip, sinus pain, sinus pressure, sneezing and sore throat.    Respiratory: Positive for cough. Negative for hemoptysis, chest tightness, shortness of breath and wheezing.    Cardiovascular: Negative for chest pain, palpitations and leg swelling.   Gastrointestinal: Negative for abdominal distention, abdominal pain, constipation, diarrhea, heartburn, nausea and vomiting.   Genitourinary: Negative for decreased urine volume, difficulty urinating, dysuria, frequency and urgency.   Musculoskeletal: Positive for myalgias. Negative for arthralgias, gait " problem and joint swelling.   Skin: Negative for rash and wound.   Allergic/Immunologic: Negative for environmental allergies.   Neurological: Positive for dizziness and headaches. Negative for light-headedness and numbness.       Objective:      Physical Exam   Constitutional: She is oriented to person, place, and time. She appears well-developed and well-nourished.   Elderly patient who presents in rollator   HENT:   Head: Normocephalic and atraumatic.   Right Ear: Hearing, tympanic membrane, external ear and ear canal normal.   Left Ear: Hearing, tympanic membrane, external ear and ear canal normal.   Nose: Nose normal. Right sinus exhibits no maxillary sinus tenderness and no frontal sinus tenderness. Left sinus exhibits no maxillary sinus tenderness and no frontal sinus tenderness.   Mouth/Throat: Oropharynx is clear and moist.   Eyes: Pupils are equal, round, and reactive to light.   Neck: Normal range of motion.   Cardiovascular: Normal rate, regular rhythm, normal heart sounds and intact distal pulses.    Pulmonary/Chest: Effort normal and breath sounds normal. No respiratory distress. She has no wheezes.   Abdominal: Soft. Bowel sounds are normal.   Musculoskeletal: Normal range of motion.   Neurological: She is alert and oriented to person, place, and time.   Skin: Skin is warm and dry.   Nursing note and vitals reviewed.      Assessment:       1. URI, acute    2. Cough    3. Dizziness        Plan:       URI, acute    Cough  -     CBC auto differential; Future; Expected date: 02/01/2018  -     X-Ray Chest PA And Lateral; Future; Expected date: 02/01/2018    Dizziness  -     Basic metabolic panel; Future; Expected date: 02/01/2018  -     CBC auto differential; Future; Expected date: 02/01/2018      Discussed with patient OTCs. Dizziness may be due to mild dehydration, instructed patient to increase fluids. Patient to follow up if no improvement or worsening symptoms.

## 2018-02-01 NOTE — PATIENT INSTRUCTIONS
Adult Self-Care for Colds  Colds are caused by viruses. They can't be cured with antibiotics. However, you can ease symptoms and support your body's efforts to heal itself.  No matter which symptoms you have, be sure to:  · Drink plenty of fluids (water or clear soup)  · Stop smoking and drinking alcohol  · Get plenty of rest    Understand a fever  · Take your temperature several times a day. If your fever is 100.4°F (38.0°C) for more than a day, call your healthcare provider.  · Relax, lie down. Go to bed if you want. Just get off your feet and rest. Also, drink plenty of fluids to avoid dehydration.  · Take acetaminophen or a nonsteroidal anti-inflammatory agent (NSAID), such as ibuprofen.  Treat a troubled nose kindly  · Breathe steam or heated humidified air to open blocked nasal passages.  a hot shower or use a vaporizer. Be careful not to get burned by the steam.  · Saline nasal sprays and decongestant tablets help open a stuffy nose. Antihistamines can also help, but they can cause side effects such as drowsiness and drying of the eyes, nose, and mouth.  Soothe a sore throat and cough  · Gargle every 2 hours with 1/4 teaspoon of salt dissolved in 1/2 cup of warm water. Suck on throat lozenges and cough drops to moisten your throat.  · Cough medicines are available but it is unclear how well they actually work.  · Take acetaminophen or an NSAID, such as ibuprofen, to ease throat pain  Ease digestive problems  · Put fluids back into your body. Take frequent sips of clear liquids such as water or broth. Avoid drinks that have a lot of sugar in them, such as juices and sodas. These can make diarrhea worse. Older children and adults can drink sports drinks.  · As your appetite returns, you can resume your normal diet. Ask your healthcare provider if there are any foods you should avoid.  When to seek medical care  When you first notice symptoms, ask your healthcare provider if antiviral medicines are  appropriate. Antibiotics should not be taken for colds or flu. Also, call your healthcare provider if you have any of the following symptoms or if you aren't feeling better after 7 days:  · Shortness of breath  · Pain or pressure in the chest or belly (abdomen)  · Worsening symptoms, especially after a period of improvement  · Fever of 100.4°F  (38.0°C) or higher, or fever that doesn't go down with medicine  · Sudden dizziness or confusion  · Severe or continued vomiting  · Signs of dehydration, including extreme thirst, dark urine, infrequent urination, dry mouth  · Spotted, red, or very sore throat   Date Last Reviewed: 12/1/2016 © 2000-2017 [employer]. [address] [place] [address], [place] [address]. All rights reserved. This information is not intended as a substitute for professional medical care. Always follow your healthcare professional's instructions.        Dizziness (Uncertain Cause)  Dizziness is a common symptom. It may be described as lightheadedness, spinning, or feeling like you are going to faint. Dizziness can have many causes.  Be sure to tell the healthcare provider about:  · All medicines you take, including prescription, over-the-counter, herbs, and supplements  · Any other symptoms you have  · Any health problems you are being treated for  · Anything that causes the dizziness to get worse or better  Today's exam did not show an exact cause for your dizziness. Other tests may be needed. Follow up with your healthcare provider.  Home care  · Dizziness that occurs with sudden standing may be a sign of mild dehydration. Drink extra fluids for the next few days.  · If you recently started a new medicine, stopped a medicine, or had the dose of a current medicine changed, talk with the prescribing healthcare provider. Your medicine plan may need adjustment.  · If dizziness lasts more than a few seconds, sit or lie down until it passes. This may help prevent injury in case you pass  out.  · Do not drive or use power tools or dangerous equipment until you have had no dizziness for at least 48 hours.  Follow-up care  Follow up with your healthcare provider for further evaluation within the next 7 days or as advised.  When to seek medical advice  Call your healthcare provider for any of the following:  · Worsening of symptoms or new symptoms  · Passing out or seizure  · Repeated vomiting  · Headache  · Palpitations (the sense that your heart is fluttering or beating fast or hard)  · Shortness of breath  · Blood in vomit or stool (black or red color)  · Weakness of an arm or leg or one side of the face  · Vision or hearing changes  · Trouble walking or speaking  · Chest, arm, neck, back, or jaw pain  Date Last Reviewed: 8/23/2015 © 2000-2017 [employer]. [address] [place] [address], [place] [address]. All rights reserved. This information is not intended as a substitute for professional medical care. Always follow your healthcare professional's instructions.      If you are not better in 3 days, if worse or you have concerns or questions, please do not hesitate to call.  You can reach us at [phone] Monday through Friday (except holidays) 12 nooon to 8 p.m.    Thank you for using the [facility]!    ROYAL Delaney, [facility]  [facility]  [facility]

## 2018-02-19 ENCOUNTER — TELEPHONE (OUTPATIENT)
Dept: FAMILY MEDICINE | Facility: CLINIC | Age: 83
End: 2018-02-19

## 2018-02-19 DIAGNOSIS — R53.81 DEBILITY: Primary | ICD-10-CM

## 2018-02-19 NOTE — TELEPHONE ENCOUNTER
I am attempting to place a referral to HH. Her spouse was just placed on hospice. Upon evaluation, it was determined that she would benefit from HH services for debility. It looks like she had active HH with [facility], but it was not discontinued in the computer. I have given a written order to [facility], but I need to update in Epic. Can you end the active orders in Epic?

## 2018-02-21 ENCOUNTER — TELEPHONE (OUTPATIENT)
Dept: GASTROENTEROLOGY | Facility: CLINIC | Age: 83
End: 2018-02-21

## 2018-02-21 ENCOUNTER — TELEPHONE (OUTPATIENT)
Dept: HEMATOLOGY/ONCOLOGY | Facility: CLINIC | Age: 83
End: 2018-02-21

## 2018-02-21 DIAGNOSIS — K21.9 GASTROESOPHAGEAL REFLUX DISEASE, ESOPHAGITIS PRESENCE NOT SPECIFIED: ICD-10-CM

## 2018-02-21 RX ORDER — DEXLANSOPRAZOLE 30 MG/1
30 CAPSULE, DELAYED RELEASE ORAL
Qty: 90 CAPSULE | Refills: 0 | Status: SHIPPED | OUTPATIENT
Start: 2018-02-21 | End: 2018-05-30 | Stop reason: SDUPTHER

## 2018-02-21 NOTE — TELEPHONE ENCOUNTER
Pt's dghtr notified Dexilant refilled for 90 days will need f/u appt for any further refillls. Verbalizied understanding

## 2018-02-21 NOTE — TELEPHONE ENCOUNTER
Returned Gustavo's call and Gustavo wanted to know if pt had labs to be drawn and I told her yes she has a lab appt already and she said she will let pt know.

## 2018-02-21 NOTE — TELEPHONE ENCOUNTER
Please inform the patient that I refilled the prescription for dexilant and she is due for a follow-up visit (last seen over a year ago) for continued evaluation and management.  Thanks  KIERRA

## 2018-02-21 NOTE — TELEPHONE ENCOUNTER
----- Message from Aiden Salazar sent at 2/21/2018  2:51 PM CST -----  Contact: Gustavo from [facility]  Gustavo from [facility] is wanting to know if pt needs labs before Friday and if so which ones?.. Please give Gustavo a call back at [phone]

## 2018-02-22 ENCOUNTER — TELEPHONE (OUTPATIENT)
Dept: FAMILY MEDICINE | Facility: CLINIC | Age: 83
End: 2018-02-22

## 2018-02-22 ENCOUNTER — LAB VISIT (OUTPATIENT)
Dept: LAB | Facility: HOSPITAL | Age: 83
End: 2018-02-22
Attending: INTERNAL MEDICINE
Payer: MEDICARE

## 2018-02-22 DIAGNOSIS — D51.8 OTHER VITAMIN B12 DEFICIENCY ANEMIA: ICD-10-CM

## 2018-02-22 DIAGNOSIS — N18.30 ANEMIA IN STAGE 3 CHRONIC KIDNEY DISEASE: ICD-10-CM

## 2018-02-22 DIAGNOSIS — D63.1 ANEMIA IN STAGE 3 CHRONIC KIDNEY DISEASE: ICD-10-CM

## 2018-02-22 LAB
ALBUMIN SERPL BCP-MCNC: 4.3 G/DL
ALP SERPL-CCNC: 68 U/L
ALT SERPL W/O P-5'-P-CCNC: 21 U/L
ANION GAP SERPL CALC-SCNC: 12 MMOL/L
AST SERPL-CCNC: 21 U/L
BASOPHILS # BLD AUTO: 0.01 K/UL
BASOPHILS NFR BLD: 0.2 %
BILIRUB SERPL-MCNC: 0.4 MG/DL
BUN SERPL-MCNC: 25 MG/DL
CALCIUM SERPL-MCNC: 9.6 MG/DL
CHLORIDE SERPL-SCNC: 100 MMOL/L
CO2 SERPL-SCNC: 29 MMOL/L
CREAT SERPL-MCNC: 1.25 MG/DL
DIFFERENTIAL METHOD: ABNORMAL
EOSINOPHIL # BLD AUTO: 0.2 K/UL
EOSINOPHIL NFR BLD: 4.3 %
ERYTHROCYTE [DISTWIDTH] IN BLOOD BY AUTOMATED COUNT: 13.2 %
EST. GFR  (AFRICAN AMERICAN): 47 ML/MIN/1.73 M^2
EST. GFR  (NON AFRICAN AMERICAN): 41 ML/MIN/1.73 M^2
GLUCOSE SERPL-MCNC: 108 MG/DL
HCT VFR BLD AUTO: 32.8 %
HGB BLD-MCNC: 10.7 G/DL
LYMPHOCYTES # BLD AUTO: 1.2 K/UL
LYMPHOCYTES NFR BLD: 23.1 %
MCH RBC QN AUTO: 30.6 PG
MCHC RBC AUTO-ENTMCNC: 32.6 G/DL
MCV RBC AUTO: 94 FL
MONOCYTES # BLD AUTO: 0.4 K/UL
MONOCYTES NFR BLD: 6.8 %
NEUTROPHILS # BLD AUTO: 3.4 K/UL
NEUTROPHILS NFR BLD: 65.6 %
NRBC BLD-RTO: 0 /100 WBC
PLATELET # BLD AUTO: 232 K/UL
PMV BLD AUTO: 10.2 FL
POTASSIUM SERPL-SCNC: 4 MMOL/L
PROT SERPL-MCNC: 7.8 G/DL
RBC # BLD AUTO: 3.5 M/UL
SODIUM SERPL-SCNC: 141 MMOL/L
VIT B12 SERPL-MCNC: 483 PG/ML
WBC # BLD AUTO: 5.16 K/UL

## 2018-02-22 PROCEDURE — 85025 COMPLETE CBC W/AUTO DIFF WBC: CPT

## 2018-02-22 PROCEDURE — 82607 VITAMIN B-12: CPT | Mod: PN

## 2018-02-22 PROCEDURE — 85025 COMPLETE CBC W/AUTO DIFF WBC: CPT | Mod: PN

## 2018-02-22 PROCEDURE — 36415 COLL VENOUS BLD VENIPUNCTURE: CPT | Mod: PN

## 2018-02-22 PROCEDURE — 80053 COMPREHEN METABOLIC PANEL: CPT | Mod: PN

## 2018-02-22 PROCEDURE — 80053 COMPREHEN METABOLIC PANEL: CPT

## 2018-02-22 PROCEDURE — 82607 VITAMIN B-12: CPT

## 2018-02-22 NOTE — TELEPHONE ENCOUNTER
Spoke w/ Gustavo. She states she got the orders for home HH written by Claritza. She said the MD show Dr Interiano and Dr Moreno. Advised her that last OV was with Dr Interiano in Nov and she could use Dr Interiano. If she prefer Dr Bernstein, that would be OK but pt needs f/u appt first since she has not seen him since Aug 2017 and this was only ordered at the recommendation of the spouse's hospice nurse. She agreed and will let pt know.

## 2018-02-22 NOTE — TELEPHONE ENCOUNTER
----- Message from Rhina Neri sent at 2/22/2018  8:46 AM CST -----  Contact: Shan with [facility] at [phone]  Shan with [facility] at [phone], Calling to let the office know the Patient has been admitted into Home Health and they will need orders signed along with a plan of care. Please advise. Thanks.

## 2018-02-26 ENCOUNTER — TELEPHONE (OUTPATIENT)
Dept: HEMATOLOGY/ONCOLOGY | Facility: CLINIC | Age: 83
End: 2018-02-26

## 2018-02-26 NOTE — TELEPHONE ENCOUNTER
Returned call to daughter, Virginia, who stated she would like the patient's most recent lab results as it is difficult to bring the patient into the office because someone has to take off work to do so. States that she was told by a nurse that she would have Dr. Steven call with the results. Explained that I would have Dr. Steven review the results and call (Virginia) back with the results and recommendations. Virginia voiced understanding and appreciation

## 2018-02-26 NOTE — TELEPHONE ENCOUNTER
----- Message from Keren Yang sent at 2/24/2018  9:57 AM CST -----  Contact: Daughter Purvi Loja need patient bloodwork results, please call back at [phone]

## 2018-02-27 ENCOUNTER — TELEPHONE (OUTPATIENT)
Dept: HEMATOLOGY/ONCOLOGY | Facility: CLINIC | Age: 83
End: 2018-02-27

## 2018-02-27 DIAGNOSIS — N18.30 STAGE 3 CHRONIC KIDNEY DISEASE: Primary | ICD-10-CM

## 2018-02-27 NOTE — TELEPHONE ENCOUNTER
----- Message from Ellie Steven MD sent at 2/27/2018 12:40 PM CST -----  Counts dropped a bit , I wuld like cbc, cmp in 3-4 months and phrev

## 2018-02-27 NOTE — TELEPHONE ENCOUNTER
"Per Dr. Steven - called patient's daughter, Virginia, discussed recent lab results as "ok", repeat cbc, cmp in 3-4 months. Set up lab and follow up for June 28, 2018 but will cancel f/u and do phone review of lab results. Daughter will have HH draw labs if patient has HH at that time.  "

## 2018-03-07 ENCOUNTER — TELEPHONE (OUTPATIENT)
Dept: FAMILY MEDICINE | Facility: CLINIC | Age: 83
End: 2018-03-07

## 2018-03-07 NOTE — TELEPHONE ENCOUNTER
----- Message from Johnny Thomas sent at 3/7/2018 12:14 PM CST -----  Contact: [facility] [phone]  Patient complain of daily headache.  Thanks!

## 2018-03-07 NOTE — TELEPHONE ENCOUNTER
----- Message from Tammy Stokes sent at 3/7/2018  4:07 PM CST -----  Contact: basilio with [facility] ph#[phone]  Please call basilio with [facility]  ph#[phone]

## 2018-03-07 NOTE — TELEPHONE ENCOUNTER
Spoke w/ basilio. Pt c/o headache that reaches down to her neck. V/s are stable and no blurred vision noted. Pt is using OTC tylenol as orderedby dr. koehler on 11-8-17 and it does ease up the s/s. hh nurse wanted provider to be aware and to see if he would have any additional advise.

## 2018-04-02 ENCOUNTER — OFFICE VISIT (OUTPATIENT)
Dept: FAMILY MEDICINE | Facility: CLINIC | Age: 83
End: 2018-04-02
Payer: MEDICARE

## 2018-04-02 ENCOUNTER — HOSPITAL ENCOUNTER (OUTPATIENT)
Dept: RADIOLOGY | Facility: HOSPITAL | Age: 83
Discharge: HOME | End: 2018-04-02
Attending: FAMILY MEDICINE
Payer: MEDICARE

## 2018-04-02 VITALS
WEIGHT: 115.88 LBS | HEIGHT: 61 IN | HEART RATE: 64 BPM | BODY MASS INDEX: 21.88 KG/M2 | SYSTOLIC BLOOD PRESSURE: 120 MMHG | TEMPERATURE: 98 F | DIASTOLIC BLOOD PRESSURE: 68 MMHG

## 2018-04-02 DIAGNOSIS — M54.2 NECK PAIN: Primary | ICD-10-CM

## 2018-04-02 DIAGNOSIS — H92.02 EAR PAIN, LEFT: ICD-10-CM

## 2018-04-02 DIAGNOSIS — I10 ESSENTIAL HYPERTENSION: ICD-10-CM

## 2018-04-02 DIAGNOSIS — M17.0 PRIMARY OSTEOARTHRITIS OF BOTH KNEES: ICD-10-CM

## 2018-04-02 DIAGNOSIS — M54.2 NECK PAIN: ICD-10-CM

## 2018-04-02 PROCEDURE — 99999 PR PBB SHADOW E&M-EST. PATIENT-LVL III: CPT | Mod: PBBFAC,,, | Performed by: FAMILY MEDICINE

## 2018-04-02 PROCEDURE — 72040 X-RAY EXAM NECK SPINE 2-3 VW: CPT | Mod: TC,PN

## 2018-04-02 PROCEDURE — 72040 X-RAY EXAM NECK SPINE 2-3 VW: CPT | Mod: 26,,, | Performed by: RADIOLOGY

## 2018-04-02 PROCEDURE — 99214 OFFICE O/P EST MOD 30 MIN: CPT | Mod: S$PBB,,, | Performed by: FAMILY MEDICINE

## 2018-04-02 PROCEDURE — 99213 OFFICE O/P EST LOW 20 MIN: CPT | Mod: PBBFAC,25,PN | Performed by: FAMILY MEDICINE

## 2018-04-02 RX ORDER — SERTRALINE HYDROCHLORIDE 100 MG/1
100 TABLET, FILM COATED ORAL DAILY
Qty: 90 TABLET | Refills: 3 | Status: SHIPPED | OUTPATIENT
Start: 2018-04-02 | End: 2018-04-30 | Stop reason: SDUPTHER

## 2018-04-02 NOTE — PROGRESS NOTES
"Subjective:       Patient ID: Sobeida Mcclain is a 82 y.o. female.    Chief Complaint: Pain in head (Sharp pain "shoots through head". States it goes into neck. Makes "a pop". Started about 1 yr ago, episodes more frequent now, daily. Pain goes into L ear.)    She is here with her daughter. Complains of pops in her lower occiput. Sometimes with neck movement, sometimes not. Causes HA and sometimes sharp left ear pain. She has had these complaints in the past. She has a cough today and she says it is mostly when she is in a closed room. Previous visits for cough as well and neg CXR. She also has a runny nose. No fever. Lots of stress with  who is on hospice and requires lots of attention. Their other daughter helps a lot. Some memory problems. Also balance problems. She uses a walker when outside. She had home PT for awhile. Limited by knee arthritis. Very stiff in AM and after prolonged sitting. Hx CAD and PVD. No chest pain. Emotional at times and zoloft 50 mg helps but not enough.       Review of Systems   Constitutional: Negative for appetite change, fever and unexpected weight change.   HENT: Positive for ear pain and rhinorrhea.    Respiratory: Positive for cough. Negative for shortness of breath.    Cardiovascular: Negative for chest pain, palpitations and leg swelling.   Gastrointestinal: Negative for abdominal distention and abdominal pain.   Genitourinary: Negative for dysuria and urgency.   Neurological: Positive for headaches.   Psychiatric/Behavioral: Positive for dysphoric mood. The patient is nervous/anxious.        Objective:     Blood pressure 120/68, pulse 64, temperature 98 °F (36.7 °C), temperature source Oral, height 5' 1" (1.549 m), weight 52.6 kg (115 lb 13.6 oz).      Physical Exam   Constitutional: She appears well-developed and well-nourished. No distress.   Walks slowly without the walker. Small steps   HENT:   R TM with healed perf. Bilateral TM movement with insufflation. Auricles " nontender. Left TMJ tender with crepitance.    Neck: No thyromegaly present.   Some restriction in motion. No crepitance. Nontender. Mild occipital tenderness   Cardiovascular: Normal rate and regular rhythm.    Murmur heard.  Bilateral carotid bruits. Left carotid scar   Musculoskeletal:   Left knee 10-90 with Medial and lateral JLT.   Right knee 5-110. No JLT   Lymphadenopathy:     She has no cervical adenopathy.   Neurological: She is alert.   Psychiatric: She has a normal mood and affect.   Seems a bit sad       Assessment:       1. Neck pain    2. Essential hypertension    3. Ear pain, left    4. Primary osteoarthritis of both knees        Plan:       Cspine xray reviewed. C3-4 osteophytes and disc space narrowing. Reassured. Increase zoloft to 100 mg. FU in 1 months.

## 2018-04-30 ENCOUNTER — OFFICE VISIT (OUTPATIENT)
Dept: FAMILY MEDICINE | Facility: CLINIC | Age: 83
End: 2018-04-30
Payer: MEDICARE

## 2018-04-30 VITALS
SYSTOLIC BLOOD PRESSURE: 132 MMHG | HEIGHT: 61 IN | TEMPERATURE: 98 F | WEIGHT: 116.94 LBS | DIASTOLIC BLOOD PRESSURE: 50 MMHG | HEART RATE: 64 BPM | BODY MASS INDEX: 22.08 KG/M2

## 2018-04-30 DIAGNOSIS — F43.21 SITUATIONAL DEPRESSION: ICD-10-CM

## 2018-04-30 DIAGNOSIS — I25.9 ISCHEMIC HEART DISEASE DUE TO CORONARY ARTERY OBSTRUCTION: ICD-10-CM

## 2018-04-30 DIAGNOSIS — I24.0 ISCHEMIC HEART DISEASE DUE TO CORONARY ARTERY OBSTRUCTION: ICD-10-CM

## 2018-04-30 DIAGNOSIS — I10 ESSENTIAL HYPERTENSION: Primary | ICD-10-CM

## 2018-04-30 PROCEDURE — 99214 OFFICE O/P EST MOD 30 MIN: CPT | Mod: S$PBB,,, | Performed by: FAMILY MEDICINE

## 2018-04-30 PROCEDURE — 99999 PR PBB SHADOW E&M-EST. PATIENT-LVL III: CPT | Mod: PBBFAC,,, | Performed by: FAMILY MEDICINE

## 2018-04-30 PROCEDURE — 99213 OFFICE O/P EST LOW 20 MIN: CPT | Mod: PBBFAC,PN | Performed by: FAMILY MEDICINE

## 2018-04-30 RX ORDER — SERTRALINE HYDROCHLORIDE 50 MG/1
50 TABLET, FILM COATED ORAL DAILY
Qty: 90 TABLET | Refills: 3
Start: 2018-04-30 | End: 2018-06-13 | Stop reason: CLARIF

## 2018-04-30 NOTE — PROGRESS NOTES
"Subjective:       Patient ID: Sobeida Mcclain is a 82 y.o. female.    Chief Complaint: Follow-up (1 mo f/u)    She is here with her daughter for follow-up.  She still has some headache.  She has had a rough 2-3 weeks because her  who has been in invalid got worse and .  The  was last week.  She is handling it okay.  She is sleeping a little bit better.  I had increased her Zoloft from 50 mg to 100 mg.  They are now asking if she should reduce her dose.  She has hypertension that has been reasonably well-controlled.  She has a history of coronary disease and peripheral vascular disease.  No chest pain and no claudication.      Review of Systems   Constitutional: Negative for fever and unexpected weight change.   Respiratory: Negative for cough and shortness of breath.    Gastrointestinal:        Every month or so she will get an episode of diarrhea.  Sometimes it is related to dairy products and sometimes rich foods.  She thinks that she does not tolerate spicy food.  She does consume and an sure or supplement.   Musculoskeletal:        She still gets popping in her neck and lower skull.   Neurological: Positive for headaches.       Objective:     Blood pressure (!) 132/50, pulse 64, temperature 97.5 °F (36.4 °C), temperature source Oral, height 5' 1" (1.549 m), weight 53 kg (116 lb 15.3 oz).      Physical Exam   Constitutional:   She is a thin lady in mild distress emotionally.   Cardiovascular: Normal rate and regular rhythm.    Pulmonary/Chest: Effort normal.   Abdominal: Soft. She exhibits no distension.   Neurological: She is alert.   Psychiatric:   She rambles on about different subjects.  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       Assessment:       1. Essential hypertension    2. 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 Situational depression        Plan:       I would wait till next week and then reduce Zoloft to 50 mg.  She should check her home medicine and " see if she still taking DEXA lent.  It is not clear that she still needs that.  She does have a past history of esophageal stenosis.  She currently has no trouble swallowing.  I reviewed her cervical spine x-ray with her daughter.  She does have disc degeneration and a small retrolisthesis.

## 2018-05-04 DIAGNOSIS — N18.9 CHRONIC KIDNEY DISEASE, UNSPECIFIED CKD STAGE: ICD-10-CM

## 2018-05-19 ENCOUNTER — TELEPHONE (OUTPATIENT)
Dept: GASTROENTEROLOGY | Facility: CLINIC | Age: 83
End: 2018-05-19

## 2018-05-19 DIAGNOSIS — K21.9 GASTROESOPHAGEAL REFLUX DISEASE, ESOPHAGITIS PRESENCE NOT SPECIFIED: ICD-10-CM

## 2018-05-21 RX ORDER — DEXLANSOPRAZOLE 30 MG/1
30 CAPSULE, DELAYED RELEASE ORAL
Qty: 90 CAPSULE | Refills: 0 | OUTPATIENT
Start: 2018-05-21 | End: 2018-08-19

## 2018-05-21 NOTE — TELEPHONE ENCOUNTER
Received refill request for dexilant. I did not refill it since it has been over a year since our last visit and patient was notified at time of last refill that she needed a follow-up appointment for any further refills (2/21/18).  Thanks  KIERRA

## 2018-05-30 ENCOUNTER — OFFICE VISIT (OUTPATIENT)
Dept: GASTROENTEROLOGY | Facility: CLINIC | Age: 83
End: 2018-05-30
Payer: MEDICARE

## 2018-05-30 ENCOUNTER — LAB VISIT (OUTPATIENT)
Dept: LAB | Facility: HOSPITAL | Age: 83
End: 2018-05-30
Attending: NURSE PRACTITIONER
Payer: MEDICARE

## 2018-05-30 VITALS
HEART RATE: 56 BPM | HEIGHT: 61 IN | WEIGHT: 116.88 LBS | DIASTOLIC BLOOD PRESSURE: 63 MMHG | BODY MASS INDEX: 22.07 KG/M2 | SYSTOLIC BLOOD PRESSURE: 126 MMHG

## 2018-05-30 DIAGNOSIS — Z79.899 ENCOUNTER FOR MONITORING LONG-TERM PROTON PUMP INHIBITOR THERAPY: ICD-10-CM

## 2018-05-30 DIAGNOSIS — Z87.19 HISTORY OF GASTROESOPHAGEAL REFLUX (GERD): ICD-10-CM

## 2018-05-30 DIAGNOSIS — R13.14 PHARYNGOESOPHAGEAL DYSPHAGIA: ICD-10-CM

## 2018-05-30 DIAGNOSIS — Z51.81 ENCOUNTER FOR MONITORING LONG-TERM PROTON PUMP INHIBITOR THERAPY: ICD-10-CM

## 2018-05-30 DIAGNOSIS — M54.2 NECK PAIN: ICD-10-CM

## 2018-05-30 DIAGNOSIS — R06.02 SHORTNESS OF BREATH: ICD-10-CM

## 2018-05-30 DIAGNOSIS — Z79.899 ENCOUNTER FOR MONITORING LONG-TERM PROTON PUMP INHIBITOR THERAPY: Primary | ICD-10-CM

## 2018-05-30 DIAGNOSIS — Z79.01 ANTICOAGULANT LONG-TERM USE: ICD-10-CM

## 2018-05-30 DIAGNOSIS — Z51.81 ENCOUNTER FOR MONITORING LONG-TERM PROTON PUMP INHIBITOR THERAPY: Primary | ICD-10-CM

## 2018-05-30 DIAGNOSIS — Z87.898 HISTORY OF HEADACHE: ICD-10-CM

## 2018-05-30 DIAGNOSIS — R07.9 NONSPECIFIC CHEST PAIN: ICD-10-CM

## 2018-05-30 LAB — MAGNESIUM SERPL-MCNC: 2.5 MG/DL

## 2018-05-30 PROCEDURE — 83735 ASSAY OF MAGNESIUM: CPT

## 2018-05-30 PROCEDURE — 99213 OFFICE O/P EST LOW 20 MIN: CPT | Mod: PBBFAC,PO | Performed by: NURSE PRACTITIONER

## 2018-05-30 PROCEDURE — 99214 OFFICE O/P EST MOD 30 MIN: CPT | Mod: S$PBB,,, | Performed by: NURSE PRACTITIONER

## 2018-05-30 PROCEDURE — 36415 COLL VENOUS BLD VENIPUNCTURE: CPT | Mod: PO

## 2018-05-30 PROCEDURE — 99999 PR PBB SHADOW E&M-EST. PATIENT-LVL III: CPT | Mod: PBBFAC,,, | Performed by: NURSE PRACTITIONER

## 2018-05-30 RX ORDER — DEXLANSOPRAZOLE 30 MG/1
30 CAPSULE, DELAYED RELEASE ORAL
Qty: 90 CAPSULE | Refills: 0 | Status: SHIPPED | OUTPATIENT
Start: 2018-05-30 | End: 2018-08-25 | Stop reason: SDUPTHER

## 2018-05-30 NOTE — PATIENT INSTRUCTIONS
"  GERD (Adult)    The esophagus is a tube that carries food from the mouth to the stomach. A valve at the lower end of the esophagus prevents stomach acid from flowing upward. When this valve doesn't work properly, stomach contents may repeatedly flow back up (reflux) into the esophagus. This is called gastroesophageal reflux disease (GERD). GERD can irritate the esophagus. It can cause problems with swallowing or breathing. In severe cases, GERD can cause recurrent pneumonia or other serious problems.  Symptoms of reflux include burning, pressure or sharp pain in the upper abdomen or mid to lower chest. The pain can spread to the neck, back, or shoulder. There may be belching, an acid taste in the back of the throat, chronic cough, or sore throat or hoarseness. GERD symptoms often occur during the day after a big meal. They can also occur at night when lying down.   Home care  Lifestyle changes can help reduce symptoms. If needed, medicines may be prescribed. Symptoms often improve with treatment, but if treatment is stopped, the symptoms often return after a few months. So most persons with GERD will need to continue treatment.  Lifestyle changes  · Limit or avoid fatty, fried, and spicy foods, as well as coffee, chocolate, mint, and foods with high acid content such as tomatoes and citrus fruit and juices (orange, grapefruit, lemon).  · Dont eat large meals, especially at night. Frequent, smaller meals are best. Do not lie down right after eating. And dont eat anything 3 hours before going to bed.  · Avoid drinking alcohol and smoking. As much as possible, stay away from second hand smoke.  · If you are overweight, losing weight will reduce symptoms.   · Avoid wearing tight clothing around your stomach area.  · If your symptoms occur during sleep, use a foam wedge to elevate your upper body (not just your head.) Or, place 4" blocks under the head of your bed.  Medicines  If needed, medicines can help relieve " the symptoms of GERD and prevent damage to the esophagus. Discuss a medicine plan with your healthcare provider. This may include one or more of the following medicines:  · Antacids to help neutralize the normal acids in your stomach.  · Acid blockers (H2 blockers) to decrease acid production.  · Acid inhibitors (PPIs) to decrease acid production in a different way than the blockers. They may work better, but can take a little longer to take effect.  Take an antacid 30-60 minutes after eating and at bedtime, but not at the same time as an acid blocker.  Try not to take medicines such as ibuprofen and aspirin. If you are taking aspirin for your heart or other medical reasons, talk to your healthcare provider about stopping it.  Follow-up care  Follow up with your healthcare provider or as advised by our staff.  When to seek medical advice  Call your healthcare provider if any of the following occur:  · Stomach pain gets worse or moves to the lower right abdomen (appendix area)  · Chest pain appears or gets worse, or spreads to the back, neck, shoulder, or arm  · Frequent vomiting (cant keep down liquids)  · Blood in the stool or vomit (red or black in color)  · Feeling weak or dizzy  · Fever of 100.4ºF (38ºC) or higher, or as directed by your healthcare provider  Date Last Reviewed: 6/23/2015 © 2000-2017 [employer]. [address] [place] [address], [place] [address]. All rights reserved. This information is not intended as a substitute for professional medical care. Always follow your healthcare professional's instructions.        Discharge Instructions: Eating a Soft Diet  You have been prescribed a soft diet (also called gastrointestinal soft diet or bland diet). This reduces the amount of work your digestive tract has to do. It also reduces the chance that your digestive tract will be irritated by the food you eat. A soft diet is prescribed for people with digestive problems. The diet consists of foods  that are tender, mildly seasoned, and easy to digest. While on this diet, you should not eat fried or spicy foods, or raw fruits and vegetables. Also avoid alcoholic beverages.  General guidelines  · Eat in a calm, relaxed atmosphere. How you eat may be as important as what you eat. Dont rush while eating. Chew your food slowly and thoroughly, and swallow slowly.  · Eat small frequent meals throughout the day, but dont eat within 2 hours of bedtime.  · Avoid any foods that cause discomfort.  · Dont use NSAIDs (nonsteroidal anti-inflammatory drugs), such as aspirin, and ibuprofen. Also avoid medicine that contain aspirin. NSAIDs can cause ulcers and delay or prevent ulcer healing.  · Use antacids as needed, but keep in mind that magnesium-containing antacids may cause diarrhea.  Foods to eat  · Cream of wheat and cream of rice  · Cooked white rice  · Mashed potatoes, and boiled potatoes without skin  · Plain pasta and noodles  · Plain white crackers (such as no-salt soda crackers)  · White bread  · Applesauce  · Cooked fruits without skins or seeds  · Mild juices, such as apple and grape  · Bananas  · Cooked or mashed vegetables without stems and seeds  ¨ Carrots  ¨ Summer squash (zucchini, yellow squash)  ¨ Winter squash (acorn, butternut, spaghetti squash)  · Cottage cheese  · Mild hard or soft cheeses  · Custard  · Yogurt without seeds or nuts  · Milk (you may need lactose-free milk)  · Ice cream without seeds or nuts  · Smooth peanut butter  · Eggs  · Fish, turkey, chicken, or other meat that is not tough or stringy  · Tofu  Foods to avoid  · Nuts and seeds  · Snack foods, such as the following:  ¨ Chocolate-containing snacks, candy, pastries, or cakes.  ¨ Potato chips (plain, barbecued, or other flavors)  ¨ Taco chips or nachos  ¨ Corn chips  ¨ Popcorn, popcorn cakes, or rice cakes  ¨ Crackers with nuts, seeds, or spicy seasonings  ¨ French fries  · Fried or greasy foods  · Whole-grain breads, rolls, and  crackers  · Breads and rolls with nuts, seeds, or bran  · Bran and granola cereals  · Berries with seeds, such as strawberries, raspberries, and blackberries  · Acidic fruits, such as oranges, grapefruits, costa, limes, and pineapples  · Raw vegetables  · Mild or hot peppers  · Sauerkraut and pickled vegetables  · Tomatoes or tomato products, such as tomato paste, tomato sauce, and tomato juice  · Barbecue sauce  · Spicy or flavored cheeses, such as jalapeño and black pepper cheese  · Crunchy peanut butter  · Dried cooked beans, such as parra, kidney, or navy beans  · The following meats:  ¨ Fried or greasy meats  ¨ Processed, spicy meats, such as sausage, ferro, ham, and lunch meats  ¨ Ribs and other meats with barbecue sauce  ¨ Tough or stringy meats, such as corned beef or beef jerky  Fluids to avoid  · Alcoholic beverages  · Coffee and regular teas  · Luiz and other drinks with caffeine  · Cranberry, orange, pineapple, and grapefruit juice  · Lemonade  · Vegetable juice  · Whole milk, if you are lactose intolerant  Follow-up  Make a follow-up appointment with a dietitian as directed by our staff.  Date Last Reviewed: 6/21/2015 © 2000-2017 [employer]. [address] [place] [id number]. All rights reserved. This information is not intended as a substitute for professional medical care. Always follow your healthcare professional's instructions.

## 2018-05-30 NOTE — PROGRESS NOTES
Subjective:       Patient ID: Sobeida Mcclain is a 82 y.o. female Body mass index is 22.08 kg/m².    Chief Complaint: Follow-up ( med refills)    This patient is established with Dr. Naik and myself.    Patient is here with daughter, whom assisted with history. Patient is here for medication refill for dexilant.      GI Problem   Primary symptoms do not include fever, weight loss, fatigue, abdominal pain, nausea, vomiting, diarrhea, melena, hematemesis, hematochezia or dysuria.   The illness is also significant for dysphagia (problems with food, liquids and pills- feels like they get stuck in esophagus; worse with meat; improved after egd with dilation). The illness does not include chills, anorexia, odynophagia, constipation or back pain. Significant associated medical issues include GERD (denies heartburn; dexilant 30 mg once daily; PAST: prilosec). Associated medical issues comments: has been taking Nsaids for pain from recent fall; reports only takes prn.     Review of Systems   Constitutional: Negative for appetite change, chills, fatigue, fever, unexpected weight change and weight loss.   HENT: Positive for trouble swallowing. Negative for mouth sores and sore throat.    Respiratory: Positive for shortness of breath (denies currently). Negative for cough, choking and chest tightness.    Cardiovascular: Positive for chest pain (chronic intermittent; denies currently; seeing Dr. Fairbanks). Negative for palpitations.   Gastrointestinal: Positive for dysphagia (problems with food, liquids and pills- feels like they get stuck in esophagus; worse with meat; improved after egd with dilation). Negative for abdominal pain, anal bleeding, anorexia, blood in stool, constipation, diarrhea, hematemesis, hematochezia, melena, nausea, rectal pain and vomiting.   Genitourinary: Negative for difficulty urinating and dysuria.   Musculoskeletal: Positive for neck pain. Negative for back pain.   Neurological: Positive for  headaches. Negative for weakness.       Past Medical History:   Diagnosis Date    Anticoagulant long-term use     Carotid artery disease     Coronary artery disease     chest discomfort, does not take NTG    Encounter for blood transfusion     Hypertension      Past Surgical History:   Procedure Laterality Date    CARDIAC SURGERY      stent insertion    CAROTID ENDARTERECTOMY      6/15/15     SECTION, CLASSIC      COLONOSCOPY  ~    CORONARY STENT PLACEMENT      x 3. 3    DILATION AND CURETTAGE OF UTERUS      3/28/2016    ENDOMETRIAL BIOPSY      tonsillectomy      TONSILLECTOMY      UPPER GASTROINTESTINAL ENDOSCOPY  2016    Dr. Naik     Family History   Problem Relation Age of Onset    Breast cancer Neg Hx     Ovarian cancer Neg Hx     Colon cancer Neg Hx     Colon polyps Neg Hx     Stomach cancer Neg Hx     Esophageal cancer Neg Hx     Diabetes Neg Hx     Heart disease Neg Hx      Reviewed 16 ct scan abdomen & pelvis & 16 pelvic ultrasound.    Wt Readings from Last 10 Encounters:   18 53 kg (116 lb 13.5 oz)   18 53 kg (116 lb 15.3 oz)   18 52.6 kg (115 lb 13.6 oz)   18 51.9 kg (114 lb 6.7 oz)   17 51.7 kg (113 lb 15.7 oz)   17 52.7 kg (116 lb 1.2 oz)   17 52.4 kg (115 lb 8.3 oz)   17 53.1 kg (117 lb 1 oz)   17 53.1 kg (117 lb 1 oz)   16 54.4 kg (120 lb)     Lab Results   Component Value Date    WBC 5.16 2018    HGB 10.7 (L) 2018    HCT 32.8 (L) 2018    MCV 94 2018     2018     CMP  Sodium   Date Value Ref Range Status   2018 141 136 - 145 mmol/L Final     Potassium   Date Value Ref Range Status   2018 4.0 3.5 - 5.1 mmol/L Final     Chloride   Date Value Ref Range Status   2018 100 95 - 110 mmol/L Final     CO2   Date Value Ref Range Status   2018 29 22 - 31 mmol/L Final     Glucose   Date Value Ref Range Status   2018 108 70 - 110  "mg/dL Final     Comment:     The ADA recommends the following guidelines for fasting glucose:  Normal:       less than 100 mg/dL  Prediabetes:  100 mg/dL to 125 mg/dL  Diabetes:     126 mg/dL or higher       BUN, Bld   Date Value Ref Range Status   02/22/2018 25 (H) 7 - 18 mg/dL Final     Creatinine   Date Value Ref Range Status   02/22/2018 1.25 0.50 - 1.40 mg/dL Final     Calcium   Date Value Ref Range Status   02/22/2018 9.6 8.4 - 10.2 mg/dL Final     Total Protein   Date Value Ref Range Status   02/22/2018 7.8 6.0 - 8.4 g/dL Final     Albumin   Date Value Ref Range Status   02/22/2018 4.3 3.5 - 5.2 g/dL Final     Total Bilirubin   Date Value Ref Range Status   02/22/2018 0.4 0.2 - 1.3 mg/dL Final     Alkaline Phosphatase   Date Value Ref Range Status   02/22/2018 68 38 - 145 U/L Final     AST   Date Value Ref Range Status   02/22/2018 21 14 - 36 U/L Final     ALT   Date Value Ref Range Status   02/22/2018 21 10 - 44 U/L Final     Anion Gap   Date Value Ref Range Status   02/22/2018 12 8 - 16 mmol/L Final     eGFR if    Date Value Ref Range Status   02/22/2018 47 (A) >60 mL/min/1.73 m^2 Final     eGFR if non    Date Value Ref Range Status   02/22/2018 41 (A) >60 mL/min/1.73 m^2 Final     Comment:     Calculation used to obtain the estimated glomerular filtration  rate (eGFR) is the CKD-EPI equation.        Lab Results   Component Value Date    [id number] 02/22/2018 8/23/16 EGD was reviewed and procedure report states:   " Findings:       One moderate benign-appearing, intrinsic stenosis was found upper        esophagus and was traversed. A guidewire was placed and the scope        was withdrawn. Dilation was performed with a Savary dilator with no        resistance at 30 Fr. Dilation was performed with a Savary dilator        with mild resistance at 36 Fr and 42 Fr.       The entire examined stomach was normal. Biopsies were taken with a        cold forceps for Helicobacter " "pylori testing using CLOtest.       The examined duodenum was normal.  Impression:          - Benign-appearing proximal esophageal stenosis.                        Dilated.                       - Normal stomach. Biopsied.                       - Normal examined duodenum.  Recommendation:      - Await Shelbie test result.                       - Continue present medications.                       - Repeat the upper endoscopy PRN for retreatment.                       - Discharge patient to home (ambulatory).".  Biopsy results:   Shelbie test negative  Objective:      Physical Exam   Constitutional: She is oriented to person, place, and time. She appears well-developed and well-nourished. No distress.   HENT:   Mouth/Throat: Oropharynx is clear and moist and mucous membranes are normal. No oral lesions. No oropharyngeal exudate.   Eyes: Conjunctivae are normal. No scleral icterus.   Pulmonary/Chest: Effort normal and breath sounds normal. No respiratory distress. She has no wheezes.   Abdominal: Soft. Normal appearance and bowel sounds are normal. She exhibits no distension, no abdominal bruit, no ascites and no mass. There is no hepatosplenomegaly. There is no tenderness. There is no rigidity, no rebound, no guarding, no tenderness at McBurney's point and negative Riley's sign. No hernia.   Neurological: She is alert and oriented to person, place, and time.   Skin: Skin is warm and dry. No rash noted. She is not diaphoretic. No erythema. No pallor.   Non-jaundiced   Psychiatric: She has a normal mood and affect. Her behavior is normal.   Nursing note and vitals reviewed.      Assessment:       1. Encounter for monitoring long-term proton pump inhibitor therapy    2. History of gastroesophageal reflux (GERD)    3. Pharyngoesophageal dysphagia    4. Nonspecific chest pain    5. Shortness of breath    6. Anticoagulant long-term use    7. History of headache    8. Neck pain        Plan:       Encounter for monitoring long-term " proton pump inhibitor therapy  -     Magnesium; Future; Expected date: 05/30/2018  - discussed with patient about long term use of reflux medications (preference to use lowest effective dose or discontinuing if possible), the risk and benefits of using these medications long term, the risk of untreated GERD such as schulz's esophagus, and recommend a diet high in calcium and/or taking OTC calcium and vitamin d supplements as directed (such as Citracal +D), pt verbalized understanding.  - recommend annual monitoring with blood work to include CMP, CBC, vitamin B12, and magnesium.    History of gastroesophageal reflux (GERD)  -   REFILL  dexlansoprazole (DEXILANT) 30 mg CpDM; Take 1 capsule (30 mg total) by mouth before breakfast.  Dispense: 90 capsule; Refill: 0, - take in the morning 30-60 minutes before breakfast, discussed about possible long term use of medication (prefer to use lowest effective dose or discontinuing if possible), pt verbalized understanding  -discussed about the different types of medications used to treat reflux and how to use them, antacids can be used PRN for breakthrough heartburn symptoms by reducing stomach acid that is already produced, H2 blockers (zantac) work by limiting the amount acid production, & PPI's work to block acid production and are taken daily, patient verbalized understanding.  -continue lifestyle modifications to help control/reduce reflux/abdominal pain including: avoid large meals, avoid eating within 2-3 hours of bedtime (avoid late night eating & lying down soon after eating), elevate head of bed if nocturnal symptoms are present, smoking cessation (if current smoker), & weight loss (if overweight).   -avoid known foods which trigger reflux symptoms & to minimize/avoid high-fat foods, chocolate, caffeine, citrus, alcohol, & tomato products.  -avoid/limit use of NSAID's, since they can cause GI upset, bleeding, and/or ulcers. If needed, take with food.   - schedule  EGD, discussed procedure with patient, patient verbalized understanding    Pharyngoesophageal dysphagia  - REFILL    dexlansoprazole (DEXILANT) 30 mg CpDM; Take 1 capsule (30 mg total) by mouth before breakfast.  Dispense: 90 capsule; Refill: 0  - schedule EGD, discussed procedure with patient and possible esophageal dilation may be performed during procedure if indicated, patient verbalized understanding  - educated patient to eat smaller more frequent meals and to eat slowly and advised to eat a soft diet.  - possible UGI/esophagram/esophageal manometry if symptoms persist    Nonspecific chest pain, Headache, neck pain, & Shortness of breath  - follow-up with PCP &/or cardiologist for continued evaluation and management    Anticoagulant long-term use  - patient is on blood-thinner(s)= aspirin and plavix, informed patient that it will likely need to be held for endoscopy, nurse will confirm with cardiologist/PCP.    Follow-up in about 1 month (around 6/30/2018), or if symptoms worsen or fail to improve.    If no improvement in symptoms or symptoms worsen, call/follow-up at clinic or go to ER.

## 2018-06-01 ENCOUNTER — TELEPHONE (OUTPATIENT)
Dept: FAMILY MEDICINE | Facility: CLINIC | Age: 83
End: 2018-06-01

## 2018-06-01 NOTE — TELEPHONE ENCOUNTER
Pt was advised pcp is not in office nor will he be in next week. Pt expressed understanding and requested to see him when possible. appt made for 6-13-18 pt states pain is chronic and if worses she will go to ed.

## 2018-06-01 NOTE — TELEPHONE ENCOUNTER
----- Message from Alison Long sent at 6/1/2018 11:56 AM CDT -----  Contact: patient   Patient calling to schedule a same day appointment today. No available appointments. Patient states she has neck pain and a popping noise in her head. Please advise. Call to pod. No answer.   Call back    Thanks!

## 2018-06-13 ENCOUNTER — OFFICE VISIT (OUTPATIENT)
Dept: FAMILY MEDICINE | Facility: CLINIC | Age: 83
End: 2018-06-13
Payer: MEDICARE

## 2018-06-13 VITALS
BODY MASS INDEX: 22.27 KG/M2 | HEART RATE: 56 BPM | WEIGHT: 117.94 LBS | SYSTOLIC BLOOD PRESSURE: 128 MMHG | DIASTOLIC BLOOD PRESSURE: 56 MMHG | TEMPERATURE: 98 F | HEIGHT: 61 IN

## 2018-06-13 DIAGNOSIS — I25.9 ISCHEMIC HEART DISEASE DUE TO CORONARY ARTERY OBSTRUCTION: ICD-10-CM

## 2018-06-13 DIAGNOSIS — R51.9 CHRONIC NONINTRACTABLE HEADACHE, UNSPECIFIED HEADACHE TYPE: Primary | ICD-10-CM

## 2018-06-13 DIAGNOSIS — G89.29 CHRONIC NONINTRACTABLE HEADACHE, UNSPECIFIED HEADACHE TYPE: Primary | ICD-10-CM

## 2018-06-13 DIAGNOSIS — M47.812 CERVICAL ARTHRITIS: ICD-10-CM

## 2018-06-13 DIAGNOSIS — I73.9 PERIPHERAL VASCULAR DISEASE: ICD-10-CM

## 2018-06-13 DIAGNOSIS — I24.0 ISCHEMIC HEART DISEASE DUE TO CORONARY ARTERY OBSTRUCTION: ICD-10-CM

## 2018-06-13 DIAGNOSIS — I10 ESSENTIAL HYPERTENSION: ICD-10-CM

## 2018-06-13 PROCEDURE — 99213 OFFICE O/P EST LOW 20 MIN: CPT | Mod: PBBFAC,PN | Performed by: FAMILY MEDICINE

## 2018-06-13 PROCEDURE — 99214 OFFICE O/P EST MOD 30 MIN: CPT | Mod: S$PBB,,, | Performed by: FAMILY MEDICINE

## 2018-06-13 PROCEDURE — 99999 PR PBB SHADOW E&M-EST. PATIENT-LVL III: CPT | Mod: PBBFAC,,, | Performed by: FAMILY MEDICINE

## 2018-06-13 RX ORDER — SERTRALINE HYDROCHLORIDE 50 MG/1
50 TABLET, FILM COATED ORAL DAILY
COMMUNITY
End: 2018-06-13 | Stop reason: SDUPTHER

## 2018-06-13 RX ORDER — SERTRALINE HYDROCHLORIDE 100 MG/1
100 TABLET, FILM COATED ORAL DAILY
Qty: 90 TABLET | Refills: 3 | Status: SHIPPED | OUTPATIENT
Start: 2018-06-13 | End: 2019-07-08 | Stop reason: SDUPTHER

## 2018-06-13 NOTE — PROGRESS NOTES
"Subjective:       Patient ID: Sobeida Mcclain is a 82 y.o. female.    Chief Complaint: Neck Pain (f/u neck pain.) and Follow-up (f/u Sertraline. Discuss if pt should take 100mg or 50mg.)    She is here with her daughter.  She has ongoing neck popping with pain.  Sometime sharp pain into her head with headache.  I reviewed the systems in the past.  She does have a C3-4 disc space reduction with mild subluxation.  She does not have radiating pain or numbness or weakness.  She has recently seen her cardiologist.  No chest pain or dyspnea.  She does have PVD.  She is more limited by her left knee arthritis.  She is still despondent over losing her .      Neck Pain    Associated symptoms include headaches. Pertinent negatives include no chest pain or fever.     Review of Systems   Constitutional: Negative for fever and unexpected weight change.   Respiratory: Negative for cough and shortness of breath.    Cardiovascular: Negative for chest pain, palpitations and leg swelling.   Musculoskeletal: Positive for arthralgias and neck pain.   Neurological: Positive for headaches.       Objective:     Blood pressure (!) 128/56, pulse (!) 56, temperature 97.6 °F (36.4 °C), temperature source Oral, height 5' 1" (1.549 m), weight 53.5 kg (117 lb 15.1 oz).      Physical Exam   Constitutional:   She is to thin lady in mild distress   Neck: No thyromegaly present.   She has some tenderness in the mid cervical muscle area more so on the left.  Extension 30 and flexion 40.  Rotation 45.   Cardiovascular: Normal rate and regular rhythm.    Pulmonary/Chest: Effort normal and breath sounds normal.   Musculoskeletal:   Left knee range of motion  degrees.  Mild medial joint line tenderness.  Mild effusion.  No laxity.  Right knee range of motion 5-120 degrees.  Mild effusion nontender.       Assessment:       1. Chronic nonintractable headache, unspecified headache type    2. Cervical arthritis    3. Ischemic heart disease due to " coronary artery obstruction    4. Essential hypertension    5. Peripheral vascular disease        Plan:       She was not interested in physical therapy for her neck or her knees.  I did show her some quadriceps strengthening exercises.  She has an upcoming EGD for esophageal stenosis.  Continue on Zoloft 100 mg.  She does think that the higher dose has helped her.

## 2018-06-20 ENCOUNTER — ANESTHESIA (OUTPATIENT)
Dept: ENDOSCOPY | Facility: HOSPITAL | Age: 83
End: 2018-06-20
Payer: MEDICARE

## 2018-06-20 ENCOUNTER — ANESTHESIA EVENT (OUTPATIENT)
Dept: ENDOSCOPY | Facility: HOSPITAL | Age: 83
End: 2018-06-20
Payer: MEDICARE

## 2018-06-20 ENCOUNTER — SURGERY (OUTPATIENT)
Age: 83
End: 2018-06-20

## 2018-06-20 ENCOUNTER — HOSPITAL ENCOUNTER (OUTPATIENT)
Facility: HOSPITAL | Age: 83
Discharge: HOME | End: 2018-06-20
Attending: INTERNAL MEDICINE | Admitting: INTERNAL MEDICINE
Payer: MEDICARE

## 2018-06-20 VITALS
SYSTOLIC BLOOD PRESSURE: 168 MMHG | OXYGEN SATURATION: 100 % | RESPIRATION RATE: 15 BRPM | DIASTOLIC BLOOD PRESSURE: 90 MMHG | BODY MASS INDEX: 22.28 KG/M2 | HEIGHT: 61 IN | WEIGHT: 118 LBS | TEMPERATURE: 98 F | HEART RATE: 78 BPM

## 2018-06-20 DIAGNOSIS — R13.10 DYSPHAGIA: ICD-10-CM

## 2018-06-20 PROCEDURE — 43248 EGD GUIDE WIRE INSERTION: CPT | Mod: ,,, | Performed by: INTERNAL MEDICINE

## 2018-06-20 PROCEDURE — D9220A PRA ANESTHESIA: Mod: ANES,,, | Performed by: ANESTHESIOLOGY

## 2018-06-20 PROCEDURE — 43248 EGD GUIDE WIRE INSERTION: CPT | Mod: PO | Performed by: INTERNAL MEDICINE

## 2018-06-20 PROCEDURE — 37000009 HC ANESTHESIA EA ADD 15 MINS: Mod: PO | Performed by: INTERNAL MEDICINE

## 2018-06-20 PROCEDURE — 25000003 PHARM REV CODE 250: Mod: PO | Performed by: INTERNAL MEDICINE

## 2018-06-20 PROCEDURE — D9220A PRA ANESTHESIA: Mod: CRNA,,, | Performed by: NURSE ANESTHETIST, CERTIFIED REGISTERED

## 2018-06-20 PROCEDURE — 37000008 HC ANESTHESIA 1ST 15 MINUTES: Mod: PO | Performed by: INTERNAL MEDICINE

## 2018-06-20 PROCEDURE — 63600175 PHARM REV CODE 636 W HCPCS: Mod: PO | Performed by: NURSE ANESTHETIST, CERTIFIED REGISTERED

## 2018-06-20 RX ORDER — LIDOCAINE HYDROCHLORIDE 10 MG/ML
1 INJECTION, SOLUTION EPIDURAL; INFILTRATION; INTRACAUDAL; PERINEURAL ONCE
Status: COMPLETED | OUTPATIENT
Start: 2018-06-20 | End: 2018-06-20

## 2018-06-20 RX ORDER — PROPOFOL 10 MG/ML
VIAL (ML) INTRAVENOUS
Status: DISCONTINUED | OUTPATIENT
Start: 2018-06-20 | End: 2018-06-20

## 2018-06-20 RX ORDER — SODIUM CHLORIDE, SODIUM LACTATE, POTASSIUM CHLORIDE, CALCIUM CHLORIDE 600; 310; 30; 20 MG/100ML; MG/100ML; MG/100ML; MG/100ML
INJECTION, SOLUTION INTRAVENOUS CONTINUOUS
Status: DISCONTINUED | OUTPATIENT
Start: 2018-06-20 | End: 2018-06-20 | Stop reason: HOSPADM

## 2018-06-20 RX ORDER — LIDOCAINE HCL/PF 100 MG/5ML
SYRINGE (ML) INTRAVENOUS
Status: DISCONTINUED | OUTPATIENT
Start: 2018-06-20 | End: 2018-06-20

## 2018-06-20 RX ADMIN — SODIUM CHLORIDE, SODIUM LACTATE, POTASSIUM CHLORIDE, AND CALCIUM CHLORIDE: .6; .31; .03; .02 INJECTION, SOLUTION INTRAVENOUS at 10:06

## 2018-06-20 RX ADMIN — PROPOFOL 50 MG: 10 INJECTION, EMULSION INTRAVENOUS at 11:06

## 2018-06-20 RX ADMIN — LIDOCAINE HYDROCHLORIDE 5 MG: 10 INJECTION, SOLUTION EPIDURAL; INFILTRATION; INTRACAUDAL; PERINEURAL at 10:06

## 2018-06-20 RX ADMIN — PROPOFOL 20 MG: 10 INJECTION, EMULSION INTRAVENOUS at 11:06

## 2018-06-20 RX ADMIN — LIDOCAINE HYDROCHLORIDE 50 MG: 20 INJECTION, SOLUTION INTRAVENOUS at 11:06

## 2018-06-20 RX ADMIN — LIDOCAINE HYDROCHLORIDE 20 MG: 20 INJECTION, SOLUTION INTRAVENOUS at 11:06

## 2018-06-20 NOTE — H&P
History & Physical - Short Stay  Gastroenterology      SUBJECTIVE:     Procedure: EGD    Chief Complaint/Indication for Procedure: Dysphagia    PTA Medications   Medication Sig    aspirin (ECOTRIN) 81 MG EC tablet Take 81 mg by mouth once daily.    clopidogrel (PLAVIX) 75 mg tablet Take 75 mg by mouth once daily.     cyanocobalamin 500 MCG tablet Take 1,000 mcg by mouth once daily.     dexlansoprazole (DEXILANT) 30 mg CpDM Take 1 capsule (30 mg total) by mouth before breakfast.    ferrous sulfate 325 mg (65 mg iron) Tab tablet TAKE 1 TABLET (325 MG TOTAL) BY MOUTH ONCE DAILY.    sertraline (ZOLOFT) 100 MG tablet Take 1 tablet (100 mg total) by mouth once daily.    triamterene-hydrochlorothiazide 37.5-25 mg (DYAZIDE) 37.5-25 mg per capsule Take 1 capsule by mouth every morning.       Review of patient's allergies indicates:   Allergen Reactions    Celestone [betamethasone sodium phosphate] Hives    Codeine      Change in mental status    Penicillins Hives        Past Medical History:   Diagnosis Date    Anticoagulant long-term use     Carotid artery disease     Coronary artery disease     chest discomfort, does not take NTG    Hypertension      Past Surgical History:   Procedure Laterality Date    CARDIAC SURGERY      stent insertion    CAROTID ENDARTERECTOMY      6/15/15     SECTION, CLASSIC      COLONOSCOPY  ~    CORONARY STENT PLACEMENT      x 3. 3/2015    DILATION AND CURETTAGE OF UTERUS      3/28/2016    ENDOMETRIAL BIOPSY      tonsillectomy      TONSILLECTOMY      UPPER GASTROINTESTINAL ENDOSCOPY  2016    Dr. Naik     Family History   Problem Relation Age of Onset    Breast cancer Neg Hx     Ovarian cancer Neg Hx     Colon cancer Neg Hx     Colon polyps Neg Hx     Stomach cancer Neg Hx     Esophageal cancer Neg Hx     Diabetes Neg Hx     Heart disease Neg Hx      Social History   Substance Use Topics    Smoking status: Never Smoker    Smokeless tobacco:  Never Used    Alcohol use No         OBJECTIVE:     Vital Signs (Most Recent)  Temp: 97.3 °F (36.3 °C) (06/20/18 1039)  Pulse: 61 (06/20/18 1039)  Resp: 15 (06/20/18 1039)  BP: 139/77 (06/20/18 1039)  SpO2: 96 % (06/20/18 1039)    Physical Exam:                                                       GENERAL:  Comfortable, in no acute distress.                                 HEENT EXAM:  Nonicteric.  No adenopathy.  Oropharynx is clear.               NECK:  Supple.                                                               LUNGS:  Clear.                                                               CARDIAC:  Regular rate and rhythm.  S1, S2.  No murmur.                      ABDOMEN:  Soft, positive bowel sounds, nontender.  No hepatosplenomegaly or masses.  No rebound or guarding.                                             EXTREMITIES:  No edema.     MENTAL STATUS:  Normal, alert and oriented.      ASSESSMENT/PLAN:     Assessment: Dysphagia    Plan: EGD    Anesthesia Plan: General    ASA Grade: ASA 2 - Patient with mild systemic disease with no functional limitations    MALLAMPATI SCORE:  I (soft palate, uvula, fauces, and tonsillar pillars visible)

## 2018-06-20 NOTE — TRANSFER OF CARE
"Anesthesia Transfer of Care Note    Patient: Sobeida Mcclain    Procedure(s) Performed: Procedure(s) (LRB):  EGD (ESOPHAGOGASTRODUODENOSCOPY) (N/A)    Patient location: PACU    Anesthesia Type: general    Transport from OR: Transported from OR on room air with adequate spontaneous ventilation    Post pain: adequate analgesia    Post assessment: no apparent anesthetic complications and tolerated procedure well    Post vital signs: stable    Level of consciousness: awake and sedated    Nausea/Vomiting: no nausea/vomiting    Complications: none    Transfer of care protocol was followed      Last vitals:   Visit Vitals  /77 (BP Location: Right arm, Patient Position: Lying)   Pulse 61   Temp 36.3 °C (97.3 °F) (Skin)   Resp 15   Ht 5' 1" (1.549 m)   Wt 53.5 kg (118 lb)   SpO2 96%   Breastfeeding? No   BMI 22.30 kg/m²     "

## 2018-06-20 NOTE — DISCHARGE SUMMARY
Discharge Note  Short Stay      SUMMARY     Admit Date: 6/20/2018    Attending Physician: Adonis Naik MD     Discharge Physician: Adonis Naik MD    Discharge Date: 6/20/2018 11:24 AM    Final Diagnosis: Dysphagia, unspecified type [R13.10]    Disposition: HOME OR SELF CARE    Patient Instructions:   Current Discharge Medication List      CONTINUE these medications which have NOT CHANGED    Details   aspirin (ECOTRIN) 81 MG EC tablet Take 81 mg by mouth once daily.      clopidogrel (PLAVIX) 75 mg tablet Take 75 mg by mouth once daily.       cyanocobalamin 500 MCG tablet Take 1,000 mcg by mouth once daily.       dexlansoprazole (DEXILANT) 30 mg CpDM Take 1 capsule (30 mg total) by mouth before breakfast.  Qty: 90 capsule, Refills: 0    Associated Diagnoses: History of gastroesophageal reflux (GERD); Pharyngoesophageal dysphagia      ferrous sulfate 325 mg (65 mg iron) Tab tablet TAKE 1 TABLET (325 MG TOTAL) BY MOUTH ONCE DAILY.  Qty: 30 tablet, Refills: 3      sertraline (ZOLOFT) 100 MG tablet Take 1 tablet (100 mg total) by mouth once daily.  Qty: 90 tablet, Refills: 3      triamterene-hydrochlorothiazide 37.5-25 mg (DYAZIDE) 37.5-25 mg per capsule Take 1 capsule by mouth every morning.             Discharge Procedure Orders (must include Diet, Follow-up, Activity)    Follow Up:  Follow up with PCP as previously scheduled  Resume routine diet.  Activity as tolerated.    No driving day of procedure.

## 2018-06-20 NOTE — [employer] PATIENT INSTRUCTIONS
Discharge Summary/Instructions after an Endoscopic Procedure  Patient Name: Sobeida Mcclain  Patient MRN: [id number]  Patient YOB: 1935 Wednesday, June 20, 2018  Adonis Naik MD  RESTRICTIONS:  During your procedure today, you received medications for sedation.  These   medications may affect your judgment, balance and coordination.  Therefore,   for 24 hours, you have the following restrictions:   - DO NOT drive a car, operate machinery, make legal/financial decisions,   sign important papers or drink alcohol.    ACTIVITY:  Today: no heavy lifting, straining or running due to procedural   sedation/anesthesia.  The following day: return to full activity including work.  DIET:  Eat and drink normally unless instructed otherwise.     TREATMENT FOR COMMON SIDE EFFECTS:  - Mild abdominal pain, nausea, belching, bloating or excessive gas:  rest,   eat lightly and use a heating pad.  - Sore Throat: treat with throat lozenges and/or gargle with warm salt   water.  - Because air was used during the procedure, expelling large amounts of air   from your rectum or belching is normal.  - If a bowel prep was taken, you may not have a bowel movement for 1-3 days.    This is normal.  SYMPTOMS TO WATCH FOR AND REPORT TO YOUR PHYSICIAN:  1. Abdominal pain or bloating, other than gas cramps.  2. Chest pain.  3. Back pain.  4. Signs of infection such as: chills or fever occurring within 24 hours   after the procedure.  5. Rectal bleeding, which would show as bright red, maroon, or black stools.   (A tablespoon of blood from the rectum is not serious, especially if   hemorrhoids are present.)  6. Vomiting.  7. Weakness or dizziness.  GO DIRECTLY TO THE NEAREST EMERGENCY ROOM IF YOU HAVE ANY OF THE FOLLOWING:      Difficulty breathing              Chills and/or fever over 101 F   Persistent vomiting and/or vomiting blood   Severe abdominal pain   Severe chest pain   Black, tarry stools   Bleeding- more than one  tablespoon   Any other symptom or condition that you feel may need urgent attention  Your doctor recommends these additional instructions:  If any biopsies were taken, your doctors clinic will contact you in 1 to 2   weeks with any results.  - Continue present medications.   - Repeat upper endoscopy PRN for retreatment.   - Discharge patient to home (ambulatory).  For questions, problems or results please call your physician - Adonis Naik MD at Work: [phone].  EMERGENCY PHONE NUMBER: [phone], LAB RESULTS: [phone]  IF A COMPLICATION OR EMERGENCY SITUATION ARISES AND YOU ARE UNABLE TO REACH   YOUR PHYSICIAN - GO DIRECTLY TO THE EMERGENCY ROOM.  ___________________________________________  Nurse Signature  ___________________________________________  Patient/Designated Responsible Party Signature  Adonis Naik MD  6/20/2018 11:24:17 AM  This report has been verified and signed electronically.  [employer]

## 2018-06-20 NOTE — TELEPHONE ENCOUNTER
Spoke with pt, she states she did not call, it may have been her daughter and she will have her daughter call us back.    normal

## 2018-06-20 NOTE — DISCHARGE INSTRUCTIONS
Anesthesia information    Anesthesia Safety      You have been given medicine  to sedate you during your procedure today. This may have included both a pain medicine and sleeping medicine. Most of the effects have worn off; however, you may continue to have some drowsiness for the next  24 hours. Anesthesia and pain medicines can cause nausea, sleepiness, dizziness and  constipation.    HOME CARE:  1) For the next EIGHT HOURS, you should be watched by a responsible adult to look for any worsening of your condition.  2) DO NOT DRINK any ALCOHOL for the next 24 HOURS.  3) DO NOT DRIVE or operate dangerous machinery during the next 24 HOURS.  FOLLOW UP with your doctor or this facility if you are not alert and back to your usual level of activity within 24 hrs.  GET PROMPT MEDICAL ATTENTION if any of the following occur:  -- Increased drowsiness  -- Increased weakness or dizziness  -- Repeated vomiting  -- If you cannot be awakened

## 2018-06-20 NOTE — PLAN OF CARE
Patient able to swallow water. Upcoming appointments reviewed. Patient brought to bathroom and car via wheelchair.

## 2018-06-20 NOTE — ANESTHESIA PREPROCEDURE EVALUATION
06/20/2018  Sobeida Mcclain is a 82 y.o., female.    Anesthesia Evaluation    I have reviewed the Patient Summary Reports.    I have reviewed the Nursing Notes.   I have reviewed the Medications.     Review of Systems  Anesthesia Hx:  No problems with previous Anesthesia    Social:  Non-Smoker    Cardiovascular:   Hypertension, well controlled CAD asymptomatic CABG/stent     Pulmonary:  Pulmonary Normal    Renal/:   Chronic Renal Disease, CRI    Musculoskeletal:   Arthritis     Neurological:  Neurology Normal    Endocrine:  Endocrine Normal        Physical Exam  General:  Well nourished    Airway/Jaw/Neck:  Airway Findings: Mouth Opening: Normal Tongue: Normal  General Airway Assessment: Adult  Oropharynx Findings:  Mallampati: II  Jaw/Neck Findings:  Neck ROM: Normal ROM     Eyes/Ears/Nose:  Eyes/Ears/Nose Findings:    Dental:  Dental Findings:   Chest/Lungs:  Chest/Lungs Findings: Normal Respiratory Rate     Heart/Vascular:  Heart Findings: Rate: Normal  Rhythm: Regular Rhythm        Mental Status:  Mental Status Findings:  Cooperative, Alert and Oriented         Anesthesia Plan  Type of Anesthesia, risks & benefits discussed:  Anesthesia Type:  general  Patient's Preference:   Intra-op Monitoring Plan:   Intra-op Monitoring Plan Comments:   Post Op Pain Control Plan: multimodal analgesia  Post Op Pain Control Plan Comments:   Induction:   IV  Beta Blocker:  Patient is not currently on a Beta-Blocker (No further documentation required).       Informed Consent: Patient understands risks and agrees with Anesthesia plan.  Questions answered. Anesthesia consent signed with patient.  ASA Score: 3     Day of Surgery Review of History & Physical:  There are no significant changes.   H&P completed by Anesthesiologist.       Ready For Surgery From Anesthesia Perspective.

## 2018-06-21 RX ORDER — FERROUS SULFATE 325(65) MG
TABLET ORAL
Qty: 30 TABLET | Refills: 3 | Status: SHIPPED | OUTPATIENT
Start: 2018-06-21 | End: 2018-10-24 | Stop reason: SDUPTHER

## 2018-06-21 NOTE — ANESTHESIA POSTPROCEDURE EVALUATION
"Anesthesia Post Evaluation    Patient: Sobeida Mcclain    Procedure(s) Performed: Procedure(s) (LRB):  EGD (ESOPHAGOGASTRODUODENOSCOPY) (N/A)    Final Anesthesia Type: general  Patient location during evaluation: PACU  Patient participation: Yes- Able to Participate  Level of consciousness: awake and alert and oriented  Post-procedure vital signs: reviewed and stable  Pain management: adequate  Airway patency: patent  PONV status at discharge: No PONV  Anesthetic complications: no      Cardiovascular status: blood pressure returned to baseline and stable  Respiratory status: unassisted and spontaneous ventilation  Hydration status: euvolemic  Follow-up not needed.        Visit Vitals  BP (!) 168/90   Pulse 78   Temp 36.6 °C (97.9 °F) (Skin)   Resp 15   Ht 5' 1" (1.549 m)   Wt 53.5 kg (118 lb)   SpO2 100%   Breastfeeding? No   BMI 22.30 kg/m²       Pain/Sd Score: Pain Assessment Performed: Yes (6/20/2018 12:10 PM)  Presence of Pain: complains of pain/discomfort (6/20/2018 12:10 PM)  Sd Score: 9 (6/20/2018 11:55 AM)      "

## 2018-06-28 ENCOUNTER — OFFICE VISIT (OUTPATIENT)
Dept: HEMATOLOGY/ONCOLOGY | Facility: CLINIC | Age: 83
End: 2018-06-28
Payer: MEDICARE

## 2018-06-28 ENCOUNTER — LAB VISIT (OUTPATIENT)
Dept: LAB | Facility: HOSPITAL | Age: 83
End: 2018-06-28
Attending: INTERNAL MEDICINE
Payer: MEDICARE

## 2018-06-28 VITALS
RESPIRATION RATE: 20 BRPM | TEMPERATURE: 98 F | DIASTOLIC BLOOD PRESSURE: 67 MMHG | HEART RATE: 56 BPM | BODY MASS INDEX: 22.56 KG/M2 | HEIGHT: 61 IN | SYSTOLIC BLOOD PRESSURE: 146 MMHG | WEIGHT: 119.5 LBS

## 2018-06-28 DIAGNOSIS — D63.1 ANEMIA IN STAGE 3 CHRONIC KIDNEY DISEASE: ICD-10-CM

## 2018-06-28 DIAGNOSIS — E53.8 B12 DEFICIENCY: Primary | ICD-10-CM

## 2018-06-28 DIAGNOSIS — D50.8 IRON DEFICIENCY ANEMIA SECONDARY TO INADEQUATE DIETARY IRON INTAKE: ICD-10-CM

## 2018-06-28 DIAGNOSIS — N18.30 ANEMIA IN STAGE 3 CHRONIC KIDNEY DISEASE: ICD-10-CM

## 2018-06-28 DIAGNOSIS — N18.30 STAGE 3 CHRONIC KIDNEY DISEASE: ICD-10-CM

## 2018-06-28 LAB
ALBUMIN SERPL BCP-MCNC: 4.1 G/DL
ALP SERPL-CCNC: 68 U/L
ALT SERPL W/O P-5'-P-CCNC: 22 U/L
ANION GAP SERPL CALC-SCNC: 11 MMOL/L
AST SERPL-CCNC: 19 U/L
BASOPHILS # BLD AUTO: 0.03 K/UL
BASOPHILS NFR BLD: 0.5 %
BILIRUB SERPL-MCNC: 0.4 MG/DL
BUN SERPL-MCNC: 25 MG/DL
CALCIUM SERPL-MCNC: 9.9 MG/DL
CHLORIDE SERPL-SCNC: 103 MMOL/L
CO2 SERPL-SCNC: 29 MMOL/L
CREAT SERPL-MCNC: 1.24 MG/DL
DIFFERENTIAL METHOD: ABNORMAL
EOSINOPHIL # BLD AUTO: 0.4 K/UL
EOSINOPHIL NFR BLD: 6.8 %
ERYTHROCYTE [DISTWIDTH] IN BLOOD BY AUTOMATED COUNT: 13.1 %
EST. GFR  (AFRICAN AMERICAN): 47 ML/MIN/1.73 M^2
EST. GFR  (NON AFRICAN AMERICAN): 41 ML/MIN/1.73 M^2
GLUCOSE SERPL-MCNC: 107 MG/DL
HCT VFR BLD AUTO: 33 %
HGB BLD-MCNC: 11 G/DL
LYMPHOCYTES # BLD AUTO: 1.6 K/UL
LYMPHOCYTES NFR BLD: 24 %
MCH RBC QN AUTO: 31 PG
MCHC RBC AUTO-ENTMCNC: 33.3 G/DL
MCV RBC AUTO: 93 FL
MONOCYTES # BLD AUTO: 0.6 K/UL
MONOCYTES NFR BLD: 8.8 %
NEUTROPHILS # BLD AUTO: 3.9 K/UL
NEUTROPHILS NFR BLD: 59.9 %
NRBC BLD-RTO: 0 /100 WBC
PLATELET # BLD AUTO: 257 K/UL
PMV BLD AUTO: 9.6 FL
POTASSIUM SERPL-SCNC: 4 MMOL/L
PROT SERPL-MCNC: 7.4 G/DL
RBC # BLD AUTO: 3.55 M/UL
SODIUM SERPL-SCNC: 143 MMOL/L
WBC # BLD AUTO: 6.45 K/UL

## 2018-06-28 PROCEDURE — 36415 COLL VENOUS BLD VENIPUNCTURE: CPT | Mod: PN

## 2018-06-28 PROCEDURE — 80053 COMPREHEN METABOLIC PANEL: CPT | Mod: PN

## 2018-06-28 PROCEDURE — 80053 COMPREHEN METABOLIC PANEL: CPT

## 2018-06-28 PROCEDURE — 99999 PR PBB SHADOW E&M-EST. PATIENT-LVL III: CPT | Mod: PBBFAC,,, | Performed by: INTERNAL MEDICINE

## 2018-06-28 PROCEDURE — 99213 OFFICE O/P EST LOW 20 MIN: CPT | Mod: S$PBB,,, | Performed by: INTERNAL MEDICINE

## 2018-06-28 PROCEDURE — 99213 OFFICE O/P EST LOW 20 MIN: CPT | Mod: PBBFAC,PN | Performed by: INTERNAL MEDICINE

## 2018-06-28 PROCEDURE — 85025 COMPLETE CBC W/AUTO DIFF WBC: CPT

## 2018-06-28 PROCEDURE — 85025 COMPLETE CBC W/AUTO DIFF WBC: CPT | Mod: PN

## 2018-06-28 NOTE — PROGRESS NOTES
HISTORY OF PRESENT ILLNESS:  This is a 80-year-old  woman coming in for   followup.  The patient has anemia of chronic disease.  She has had   postmenopausal bleeding.  Endometrial biopsies were negative for malignancy.    She is no longer having any bleeding.  She continues to see Dr. Recinos for her CAD.    The patient states she is tired all night long because she stays   awake with her , who coughs a lot and she also has restless nights and   tends to stick on the bed long during the day.  The patient is small built,   chronically ill-appearing, with anemia of chronic disease, here for followup.of labs, had stents in her heart and leg by Dr. Recinos, pt is feeling much more active, she has DJD pain that is debilitating but she dosent want any rx.    passed away, has a stress test in July, here with cory , no issues       PHYSICAL EXAMINATION:  Wt Readings from Last 3 Encounters:   06/20/18 53.5 kg (118 lb)   06/13/18 53.5 kg (117 lb 15.1 oz)   05/30/18 53 kg (116 lb 13.5 oz)     Temp Readings from Last 3 Encounters:   06/20/18 97.9 °F (36.6 °C) (Skin)   06/13/18 97.6 °F (36.4 °C) (Oral)   04/30/18 97.5 °F (36.4 °C) (Oral)     BP Readings from Last 3 Encounters:   06/20/18 (!) 168/90   06/13/18 (!) 128/56   05/30/18 126/63     Pulse Readings from Last 3 Encounters:   06/20/18 78   06/13/18 (!) 56   05/30/18 (!) 56     GENERAL:  Chronically ill-appearing woman.  Ambulating using a walker.    Comfortable looking patient.  Patient is in no distress.  Awake, alert and   oriented to time, person and place.  No anxiety, or agitation.  HEENT:  Normal   conjunctivae and eyelids.  WNL.  PERRLA 3 to 4 mm.  No icterus, no pallor, no   congestion, and no discharge noted.   NECK:  Supple.  Trachea is central.  No crepitus.  No JVD or masses.  RESPIRATORY:  No intercostal retractions.  No dullness to percussion.  Chest is   clear to auscultation.  No rales, rhonchi or wheezes.  No crepitus.  Good air    entry bilaterally.  CARDIOVASCULAR:  S1 and S2 are normally heard without murmurs or gallops.  All   peripheral pulses are present.  ABDOMEN:  Normal abdomen.  No hepatosplenomegaly.  No free fluid.  Bowel sounds   are present.  No hernia noted.  No masses.  No rebound or tenderness.  No   guarding or rigidity.  Umbilicus is midline.  NEUROLOGIC:  Higher functions are appropriate.  No cranial nerve deficits.    Normal gait.  Normal strength.  Motor and sensory functions are normal.   .    LABORATORY DATA:    Lab Results   Component Value Date    WBC 6.45 06/28/2018    HGB 11.0 (L) 06/28/2018    HCT 33.0 (L) 06/28/2018    MCV 93 06/28/2018     06/28/2018     CMP  Sodium   Date Value Ref Range Status   06/28/2018 143 136 - 145 mmol/L Final     Potassium   Date Value Ref Range Status   06/28/2018 4.0 3.5 - 5.1 mmol/L Final     Chloride   Date Value Ref Range Status   06/28/2018 103 95 - 110 mmol/L Final     CO2   Date Value Ref Range Status   06/28/2018 29 22 - 31 mmol/L Final     Glucose   Date Value Ref Range Status   06/28/2018 107 70 - 110 mg/dL Final     Comment:     The ADA recommends the following guidelines for fasting glucose:  Normal:       less than 100 mg/dL  Prediabetes:  100 mg/dL to 125 mg/dL  Diabetes:     126 mg/dL or higher       BUN, Bld   Date Value Ref Range Status   06/28/2018 25 (H) 7 - 18 mg/dL Final     Creatinine   Date Value Ref Range Status   06/28/2018 1.24 0.50 - 1.40 mg/dL Final     Calcium   Date Value Ref Range Status   06/28/2018 9.9 8.4 - 10.2 mg/dL Final     Total Protein   Date Value Ref Range Status   06/28/2018 7.4 6.0 - 8.4 g/dL Final     Albumin   Date Value Ref Range Status   06/28/2018 4.1 3.5 - 5.2 g/dL Final     Total Bilirubin   Date Value Ref Range Status   06/28/2018 0.4 0.2 - 1.3 mg/dL Final     Alkaline Phosphatase   Date Value Ref Range Status   06/28/2018 68 38 - 145 U/L Final     AST   Date Value Ref Range Status   06/28/2018 19 14 - 36 U/L Final     ALT    Date Value Ref Range Status   06/28/2018 22 10 - 44 U/L Final     Anion Gap   Date Value Ref Range Status   06/28/2018 11 8 - 16 mmol/L Final     eGFR if    Date Value Ref Range Status   06/28/2018 47 (A) >60 mL/min/1.73 m^2 Final     eGFR if non    Date Value Ref Range Status   06/28/2018 41 (A) >60 mL/min/1.73 m^2 Final     Comment:     Calculation used to obtain the estimated glomerular filtration  rate (eGFR) is the CKD-EPI equation.        PLAN:  Continue to monitor,hgb stable.    Rest of anemia workup has been   negative so far and only a bone marrow has not been performed yet.  We will   continue to observe only as the numbers are improving.  Return to clinic in 8 months  Mild CKD :stable rtc 6-8 months again for clinical f/u  Cont supplement of iron and b12

## 2018-08-25 DIAGNOSIS — R13.14 PHARYNGOESOPHAGEAL DYSPHAGIA: ICD-10-CM

## 2018-08-25 DIAGNOSIS — Z87.19 HISTORY OF GASTROESOPHAGEAL REFLUX (GERD): ICD-10-CM

## 2018-08-27 RX ORDER — DEXLANSOPRAZOLE 30 MG/1
CAPSULE, DELAYED RELEASE ORAL
Qty: 90 CAPSULE | Refills: 2 | Status: SHIPPED | OUTPATIENT
Start: 2018-08-27 | End: 2019-08-10 | Stop reason: SDUPTHER

## 2018-08-31 ENCOUNTER — EXTERNAL CHRONIC CARE MANAGEMENT (OUTPATIENT)
Dept: PRIMARY CARE CLINIC | Facility: CLINIC | Age: 83
End: 2018-08-31
Payer: MEDICARE

## 2018-08-31 PROCEDURE — 99490 CHRNC CARE MGMT STAFF 1ST 20: CPT | Mod: PBBFAC,PN | Performed by: FAMILY MEDICINE

## 2018-08-31 PROCEDURE — 99490 CHRNC CARE MGMT STAFF 1ST 20: CPT | Mod: S$PBB,,, | Performed by: FAMILY MEDICINE

## 2018-09-30 ENCOUNTER — EXTERNAL CHRONIC CARE MANAGEMENT (OUTPATIENT)
Dept: PRIMARY CARE CLINIC | Facility: CLINIC | Age: 83
End: 2018-09-30
Payer: MEDICARE

## 2018-09-30 PROCEDURE — 99490 CHRNC CARE MGMT STAFF 1ST 20: CPT | Mod: S$PBB,,, | Performed by: FAMILY MEDICINE

## 2018-09-30 PROCEDURE — 99490 CHRNC CARE MGMT STAFF 1ST 20: CPT | Mod: PBBFAC,PN | Performed by: FAMILY MEDICINE

## 2018-10-24 RX ORDER — FERROUS SULFATE 325(65) MG
TABLET ORAL
Qty: 90 TABLET | Refills: 3 | Status: CANCELLED | OUTPATIENT
Start: 2018-10-24

## 2018-10-24 RX ORDER — FERROUS SULFATE 325(65) MG
TABLET ORAL
Qty: 90 TABLET | Refills: 3 | Status: SHIPPED | OUTPATIENT
Start: 2018-10-24 | End: 2020-01-07

## 2018-10-31 ENCOUNTER — EXTERNAL CHRONIC CARE MANAGEMENT (OUTPATIENT)
Dept: PRIMARY CARE CLINIC | Facility: CLINIC | Age: 83
End: 2018-10-31
Payer: MEDICARE

## 2018-10-31 PROCEDURE — 99490 CHRNC CARE MGMT STAFF 1ST 20: CPT | Mod: PBBFAC,PN | Performed by: FAMILY MEDICINE

## 2018-10-31 PROCEDURE — 99490 CHRNC CARE MGMT STAFF 1ST 20: CPT | Mod: S$PBB,,, | Performed by: FAMILY MEDICINE

## 2018-11-07 ENCOUNTER — LAB VISIT (OUTPATIENT)
Dept: LAB | Facility: HOSPITAL | Age: 83
End: 2018-11-07
Attending: FAMILY MEDICINE
Payer: MEDICARE

## 2018-11-07 ENCOUNTER — OFFICE VISIT (OUTPATIENT)
Dept: FAMILY MEDICINE | Facility: CLINIC | Age: 83
End: 2018-11-07
Payer: MEDICARE

## 2018-11-07 VITALS
DIASTOLIC BLOOD PRESSURE: 80 MMHG | WEIGHT: 118.94 LBS | HEIGHT: 61 IN | HEART RATE: 112 BPM | TEMPERATURE: 98 F | BODY MASS INDEX: 22.46 KG/M2 | SYSTOLIC BLOOD PRESSURE: 142 MMHG

## 2018-11-07 DIAGNOSIS — I73.9 PERIPHERAL VASCULAR DISEASE: ICD-10-CM

## 2018-11-07 DIAGNOSIS — I24.0 ISCHEMIC HEART DISEASE DUE TO CORONARY ARTERY OBSTRUCTION: ICD-10-CM

## 2018-11-07 DIAGNOSIS — R53.83 FATIGUE, UNSPECIFIED TYPE: ICD-10-CM

## 2018-11-07 DIAGNOSIS — I10 ESSENTIAL HYPERTENSION: ICD-10-CM

## 2018-11-07 DIAGNOSIS — D52.0 DIETARY FOLATE DEFICIENCY ANEMIA: ICD-10-CM

## 2018-11-07 DIAGNOSIS — M17.0 OSTEOARTHRITIS OF BOTH KNEES, UNSPECIFIED OSTEOARTHRITIS TYPE: ICD-10-CM

## 2018-11-07 DIAGNOSIS — I25.9 ISCHEMIC HEART DISEASE DUE TO CORONARY ARTERY OBSTRUCTION: ICD-10-CM

## 2018-11-07 DIAGNOSIS — G73.7 MYOPATHY IN DISEASES CLASSIFIED ELSEWHERE: ICD-10-CM

## 2018-11-07 DIAGNOSIS — I48.91 ATRIAL FIBRILLATION BY ELECTROCARDIOGRAM: Primary | ICD-10-CM

## 2018-11-07 LAB
25(OH)D3+25(OH)D2 SERPL-MCNC: 39 NG/ML
ALBUMIN SERPL BCP-MCNC: 3.9 G/DL
ALP SERPL-CCNC: 93 U/L
ALT SERPL W/O P-5'-P-CCNC: 6 U/L
ANION GAP SERPL CALC-SCNC: 13 MMOL/L
AST SERPL-CCNC: 18 U/L
BASOPHILS # BLD AUTO: 0.06 K/UL
BASOPHILS NFR BLD: 0.7 %
BILIRUB SERPL-MCNC: 0.3 MG/DL
BUN SERPL-MCNC: 17 MG/DL
CALCIUM SERPL-MCNC: 9.7 MG/DL
CHLORIDE SERPL-SCNC: 104 MMOL/L
CHOLEST SERPL-MCNC: 223 MG/DL
CHOLEST/HDLC SERPL: 6.2 {RATIO}
CO2 SERPL-SCNC: 24 MMOL/L
CREAT SERPL-MCNC: 1.2 MG/DL
DIFFERENTIAL METHOD: ABNORMAL
EOSINOPHIL # BLD AUTO: 0.5 K/UL
EOSINOPHIL NFR BLD: 5.7 %
ERYTHROCYTE [DISTWIDTH] IN BLOOD BY AUTOMATED COUNT: 13.7 %
ERYTHROCYTE [SEDIMENTATION RATE] IN BLOOD BY WESTERGREN METHOD: 28 MM/HR
EST. GFR  (AFRICAN AMERICAN): 49 ML/MIN/1.73 M^2
EST. GFR  (NON AFRICAN AMERICAN): 42.5 ML/MIN/1.73 M^2
GLUCOSE SERPL-MCNC: 94 MG/DL
HCT VFR BLD AUTO: 38.5 %
HDLC SERPL-MCNC: 36 MG/DL
HDLC SERPL: 16.1 %
HGB BLD-MCNC: 12.2 G/DL
IMM GRANULOCYTES # BLD AUTO: 0.02 K/UL
IMM GRANULOCYTES NFR BLD AUTO: 0.2 %
LDLC SERPL CALC-MCNC: 149.6 MG/DL
LYMPHOCYTES # BLD AUTO: 2.1 K/UL
LYMPHOCYTES NFR BLD: 24 %
MCH RBC QN AUTO: 29.3 PG
MCHC RBC AUTO-ENTMCNC: 31.7 G/DL
MCV RBC AUTO: 93 FL
MONOCYTES # BLD AUTO: 0.6 K/UL
MONOCYTES NFR BLD: 7.1 %
NEUTROPHILS # BLD AUTO: 5.4 K/UL
NEUTROPHILS NFR BLD: 62.3 %
NONHDLC SERPL-MCNC: 187 MG/DL
NRBC BLD-RTO: 0 /100 WBC
PLATELET # BLD AUTO: 327 K/UL
PMV BLD AUTO: 10.3 FL
POTASSIUM SERPL-SCNC: 3.6 MMOL/L
PROT SERPL-MCNC: 8.2 G/DL
RBC # BLD AUTO: 4.16 M/UL
SODIUM SERPL-SCNC: 141 MMOL/L
TRIGL SERPL-MCNC: 187 MG/DL
TSH SERPL DL<=0.005 MIU/L-ACNC: 1.89 UIU/ML
VIT B12 SERPL-MCNC: 559 PG/ML
WBC # BLD AUTO: 8.7 K/UL

## 2018-11-07 PROCEDURE — 99213 OFFICE O/P EST LOW 20 MIN: CPT | Mod: PBBFAC,PN,25 | Performed by: FAMILY MEDICINE

## 2018-11-07 PROCEDURE — 99214 OFFICE O/P EST MOD 30 MIN: CPT | Mod: S$PBB,,, | Performed by: FAMILY MEDICINE

## 2018-11-07 PROCEDURE — 85025 COMPLETE CBC W/AUTO DIFF WBC: CPT

## 2018-11-07 PROCEDURE — 36415 COLL VENOUS BLD VENIPUNCTURE: CPT | Mod: PN

## 2018-11-07 PROCEDURE — 85652 RBC SED RATE AUTOMATED: CPT

## 2018-11-07 PROCEDURE — 93010 ELECTROCARDIOGRAM REPORT: CPT | Mod: S$PBB,,, | Performed by: INTERNAL MEDICINE

## 2018-11-07 PROCEDURE — 93005 ELECTROCARDIOGRAM TRACING: CPT | Mod: PBBFAC,PN | Performed by: INTERNAL MEDICINE

## 2018-11-07 PROCEDURE — 80053 COMPREHEN METABOLIC PANEL: CPT

## 2018-11-07 PROCEDURE — 80061 LIPID PANEL: CPT

## 2018-11-07 PROCEDURE — 84443 ASSAY THYROID STIM HORMONE: CPT

## 2018-11-07 PROCEDURE — 99999 PR PBB SHADOW E&M-EST. PATIENT-LVL III: CPT | Mod: PBBFAC,,, | Performed by: FAMILY MEDICINE

## 2018-11-07 PROCEDURE — 82306 VITAMIN D 25 HYDROXY: CPT

## 2018-11-07 PROCEDURE — 82607 VITAMIN B-12: CPT

## 2018-11-07 RX ORDER — METOPROLOL TARTRATE 25 MG/1
25 TABLET, FILM COATED ORAL 2 TIMES DAILY
Qty: 180 TABLET | Refills: 1 | Status: SHIPPED | OUTPATIENT
Start: 2018-11-07 | End: 2019-06-06 | Stop reason: SDUPTHER

## 2018-11-07 NOTE — PROGRESS NOTES
"Subjective:       Patient ID: Sobeida Mcclain is a 83 y.o. female.    Chief Complaint: Leg Pain (BLE pain around the knee, w/ "brandee horses")    She comes in today with her daughter with several complaints.  She has tiredness.  Especially the past 2 weeks.  She sometimes feels faint.  She complains of pain in her low back and in her head.  She has had some fleeting chest pain but only lasting less than a minute.  She has a history of coronary disease with 4 stents.  She also has a history of peripheral vascular disease with the lower extremity stent.  She has previous carotid endarterectomy.  She has not been complaining of shortness of breath.  She takes daytime naps but that is chronic.  She does not complain of cold intolerance.  She does have knee arthritis.  She does not have limitations in how far she can walk.  She walks slowly with a cane.  She has been doing some stooping and kneeling at home.  She is followed by Hematology for chronic anemia.      Review of Systems   Constitutional: Positive for fatigue. Negative for appetite change, chills, fever and unexpected weight change.   Respiratory: Negative for cough and shortness of breath.    Cardiovascular: Negative for chest pain, palpitations and leg swelling.   Gastrointestinal: Negative for abdominal pain, constipation, diarrhea and nausea.   Musculoskeletal: Positive for arthralgias, back pain, gait problem and joint swelling.       Objective:     Blood pressure (!) 142/80, pulse (!) 112, temperature 98.2 °F (36.8 °C), temperature source Oral, height 5' 1" (1.549 m), weight 53.9 kg (118 lb 15 oz).      Physical Exam   Constitutional:   Older lady in mild distress   HENT:   Right Ear: External ear normal.   Left Ear: External ear normal.   Nose: Nose normal.   Mouth/Throat: Oropharynx is clear and moist.   Neck: No thyromegaly present.   Cardiovascular:   Irregular tachycardia with no murmur.   Pulmonary/Chest: Effort normal and breath sounds normal. No " respiratory distress.   Abdominal: Soft. Bowel sounds are normal. She exhibits no distension. There is no tenderness.   Musculoskeletal: She exhibits no edema.   Lymphadenopathy:     She has no cervical adenopathy.   Neurological: She is alert.       Assessment:       1. Atrial fibrillation by electrocardiogram    2. Fatigue, unspecified type    3. Osteoarthritis of both knees, unspecified osteoarthritis type    4. Essential hypertension    5. Peripheral vascular disease    6. Ischemic heart disease due to coronary artery obstruction    7. 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 Dietary folate deficiency anemia         Plan:       I reviewed her EKG.  It shows atrial fibrillation with a rapid ventricular response.  There is some ST-T abnormality.    new onset atrial fibrillation.  Xarelto 20 mg daily.  I discussed alternatives.  Metoprolol 25 mg b.i.d..  Blood chemistry TSH CBC vitamin-D level vitamin B12 level.  Echocardiogram.  Follow-up appointment with Dr. Recinos.  See me if there will be a delay.  I will send this note to Dr. Recinos

## 2018-11-15 ENCOUNTER — CLINICAL SUPPORT (OUTPATIENT)
Dept: CARDIOLOGY | Facility: CLINIC | Age: 83
End: 2018-11-15
Attending: FAMILY MEDICINE
Payer: MEDICARE

## 2018-11-15 ENCOUNTER — TELEPHONE (OUTPATIENT)
Dept: FAMILY MEDICINE | Facility: CLINIC | Age: 83
End: 2018-11-15

## 2018-11-15 VITALS
SYSTOLIC BLOOD PRESSURE: 146 MMHG | DIASTOLIC BLOOD PRESSURE: 56 MMHG | HEART RATE: 53 BPM | HEIGHT: 61 IN | WEIGHT: 118 LBS | BODY MASS INDEX: 22.28 KG/M2

## 2018-11-15 DIAGNOSIS — I48.91 ATRIAL FIBRILLATION BY ELECTROCARDIOGRAM: ICD-10-CM

## 2018-11-15 PROCEDURE — 99999 PR PBB SHADOW E&M-EST. PATIENT-LVL II: CPT | Mod: PBBFAC,,,

## 2018-11-15 PROCEDURE — 99212 OFFICE O/P EST SF 10 MIN: CPT | Mod: PBBFAC,PO,25

## 2018-11-15 PROCEDURE — 93306 TTE W/DOPPLER COMPLETE: CPT | Mod: PBBFAC,PO | Performed by: INTERNAL MEDICINE

## 2018-11-16 NOTE — TELEPHONE ENCOUNTER
----- Message from Crystal Arellano sent at 11/15/2018 11:28 AM CST -----  Regarding: EKG ORDER  Please place and link EKG order to the EKG or Doctor appointment scheduled on 11/07/18 thanks. Crystal [id number]

## 2018-11-19 LAB
ASCENDING AORTA: 1.78 CM
AV MEAN GRADIENT: 4.67 MMHG
AV PEAK GRADIENT: 9.24 MMHG
AV VALVE AREA: 1.97 CM2
BSA FOR ECHO PROCEDURE: 1.52 M2
CV ECHO LV RWT: 0.47 CM
DOP CALC AO PEAK VEL: 1.52 M/S
DOP CALC AO VTI: 37.49 CM
DOP CALC LVOT AREA: 2.63 CM2
DOP CALC LVOT DIAMETER: 1.83 CM
DOP CALC LVOT STROKE VOLUME: 73.79 CM3
DOP CALCLVOT PEAK VEL VTI: 28.07 CM
E WAVE DECELERATION TIME: 277.06 MSEC
E/A RATIO: 1.08
E/E' RATIO: 10.11
ECHO LV POSTERIOR WALL: 0.91 CM (ref 0.6–1.1)
FRACTIONAL SHORTENING: 43 % (ref 28–44)
INTERVENTRICULAR SEPTUM: 0.98 CM (ref 0.6–1.1)
IVRT: 0.11 MSEC
LA MAJOR: 4.84 CM
LA MINOR: 4.55 CM
LA WIDTH: 3.52 CM
LEFT ATRIUM SIZE: 2.83 CM
LEFT ATRIUM VOLUME INDEX: 26.1 ML/M2
LEFT ATRIUM VOLUME: 39.72 CM3
LEFT INTERNAL DIMENSION IN SYSTOLE: 2.21 CM (ref 2.1–4)
LEFT VENTRICLE DIASTOLIC VOLUME INDEX: 41.93 ML/M2
LEFT VENTRICLE DIASTOLIC VOLUME: 63.73 ML
LEFT VENTRICLE MASS INDEX: 72.7 G/M2
LEFT VENTRICLE SYSTOLIC VOLUME INDEX: 10.8 ML/M2
LEFT VENTRICLE SYSTOLIC VOLUME: 16.38 ML
LEFT VENTRICULAR INTERNAL DIMENSION IN DIASTOLE: 3.85 CM (ref 3.5–6)
LEFT VENTRICULAR MASS: 110.47 G
LV LATERAL E/E' RATIO: 10.11
LV SEPTAL E/E' RATIO: 10.11
MV PEAK A VEL: 0.84 M/S
MV PEAK E VEL: 0.91 M/S
PISA TR MAX VEL: 2.88 M/S
PULM VEIN S/D RATIO: 1.29
PV PEAK D VEL: 0.65 M/S
PV PEAK S VEL: 0.84 M/S
RA MAJOR: 3.88 CM
RA PRESSURE: 3 MMHG
RA WIDTH: 2.64 CM
RIGHT VENTRICULAR END-DIASTOLIC DIMENSION: 2.99 CM
RV TISSUE DOPPLER FREE WALL SYSTOLIC VELOCITY 1 (APICAL 4 CHAMBER VIEW): 13.29 M/S
SINUS: 2.16 CM
STJ: 1.72 CM
TDI LATERAL: 0.09
TDI SEPTAL: 0.09
TDI: 0.09
TR MAX PG: 33.18 MMHG
TRICUSPID ANNULAR PLANE SYSTOLIC EXCURSION: 2.82 CM
TV REST PULMONARY ARTERY PRESSURE: 36.18 MMHG

## 2018-11-27 ENCOUNTER — PATIENT MESSAGE (OUTPATIENT)
Dept: FAMILY MEDICINE | Facility: CLINIC | Age: 83
End: 2018-11-27

## 2018-11-30 ENCOUNTER — EXTERNAL CHRONIC CARE MANAGEMENT (OUTPATIENT)
Dept: PRIMARY CARE CLINIC | Facility: CLINIC | Age: 83
End: 2018-11-30
Payer: MEDICARE

## 2018-11-30 PROCEDURE — 99490 CHRNC CARE MGMT STAFF 1ST 20: CPT | Mod: S$PBB,,, | Performed by: FAMILY MEDICINE

## 2018-11-30 PROCEDURE — 99490 CHRNC CARE MGMT STAFF 1ST 20: CPT | Mod: PBBFAC,PN | Performed by: FAMILY MEDICINE

## 2018-12-31 ENCOUNTER — EXTERNAL CHRONIC CARE MANAGEMENT (OUTPATIENT)
Dept: PRIMARY CARE CLINIC | Facility: CLINIC | Age: 83
End: 2018-12-31
Payer: MEDICARE

## 2018-12-31 PROCEDURE — 99490 CHRNC CARE MGMT STAFF 1ST 20: CPT | Mod: PBBFAC,PN | Performed by: FAMILY MEDICINE

## 2018-12-31 PROCEDURE — 99490 PR CHRONIC CARE MGMT, 1ST 20 MIN: ICD-10-PCS | Mod: S$PBB,,, | Performed by: FAMILY MEDICINE

## 2018-12-31 PROCEDURE — 99490 CHRNC CARE MGMT STAFF 1ST 20: CPT | Mod: S$PBB,,, | Performed by: FAMILY MEDICINE

## 2019-02-21 ENCOUNTER — TELEPHONE (OUTPATIENT)
Dept: HEMATOLOGY/ONCOLOGY | Facility: CLINIC | Age: 84
End: 2019-02-21

## 2019-02-21 ENCOUNTER — LAB VISIT (OUTPATIENT)
Dept: LAB | Facility: HOSPITAL | Age: 84
End: 2019-02-21
Attending: OPHTHALMOLOGY
Payer: MEDICARE

## 2019-02-21 DIAGNOSIS — D50.8 IRON DEFICIENCY ANEMIA SECONDARY TO INADEQUATE DIETARY IRON INTAKE: ICD-10-CM

## 2019-02-21 DIAGNOSIS — E53.8 B12 DEFICIENCY: ICD-10-CM

## 2019-02-21 LAB
ALBUMIN SERPL BCP-MCNC: 3.8 G/DL
ALP SERPL-CCNC: 81 U/L
ALT SERPL W/O P-5'-P-CCNC: 6 U/L
ANION GAP SERPL CALC-SCNC: 9 MMOL/L
AST SERPL-CCNC: 12 U/L
BASOPHILS # BLD AUTO: 0.02 K/UL
BASOPHILS NFR BLD: 0.3 %
BILIRUB SERPL-MCNC: 0.3 MG/DL
BUN SERPL-MCNC: 25 MG/DL
CALCIUM SERPL-MCNC: 10.1 MG/DL
CHLORIDE SERPL-SCNC: 104 MMOL/L
CO2 SERPL-SCNC: 27 MMOL/L
CREAT SERPL-MCNC: 1.5 MG/DL
DIFFERENTIAL METHOD: ABNORMAL
EOSINOPHIL # BLD AUTO: 0.3 K/UL
EOSINOPHIL NFR BLD: 5.4 %
ERYTHROCYTE [DISTWIDTH] IN BLOOD BY AUTOMATED COUNT: 13.9 %
EST. GFR  (AFRICAN AMERICAN): 37 ML/MIN/1.73 M^2
EST. GFR  (NON AFRICAN AMERICAN): 32 ML/MIN/1.73 M^2
FERRITIN SERPL-MCNC: 365 NG/ML
GLUCOSE SERPL-MCNC: 85 MG/DL
HCT VFR BLD AUTO: 35.6 %
HGB BLD-MCNC: 11.7 G/DL
IRON SERPL-MCNC: 62 UG/DL
LYMPHOCYTES # BLD AUTO: 1.6 K/UL
LYMPHOCYTES NFR BLD: 27.5 %
MCH RBC QN AUTO: 30.2 PG
MCHC RBC AUTO-ENTMCNC: 32.9 G/DL
MCV RBC AUTO: 92 FL
MONOCYTES # BLD AUTO: 0.6 K/UL
MONOCYTES NFR BLD: 10.4 %
NEUTROPHILS # BLD AUTO: 3.3 K/UL
NEUTROPHILS NFR BLD: 56.4 %
PLATELET # BLD AUTO: 288 K/UL
PLATELET BLD QL SMEAR: ABNORMAL
PMV BLD AUTO: 9.8 FL
POTASSIUM SERPL-SCNC: 4.7 MMOL/L
PROT SERPL-MCNC: 7.3 G/DL
RBC # BLD AUTO: 3.87 M/UL
SATURATED IRON: 21 %
SODIUM SERPL-SCNC: 140 MMOL/L
TOTAL IRON BINDING CAPACITY: 293 UG/DL
TRANSFERRIN SERPL-MCNC: 198 MG/DL
VIT B12 SERPL-MCNC: 450 PG/ML
WBC # BLD AUTO: 5.78 K/UL

## 2019-02-21 PROCEDURE — 36415 COLL VENOUS BLD VENIPUNCTURE: CPT | Mod: PO

## 2019-02-21 PROCEDURE — 80053 COMPREHEN METABOLIC PANEL: CPT | Mod: PO

## 2019-02-21 PROCEDURE — 82607 VITAMIN B-12: CPT

## 2019-02-21 PROCEDURE — 82728 ASSAY OF FERRITIN: CPT

## 2019-02-21 PROCEDURE — 83540 ASSAY OF IRON: CPT

## 2019-02-21 PROCEDURE — 85025 COMPLETE CBC W/AUTO DIFF WBC: CPT | Mod: PO

## 2019-02-23 LAB — STFR SERPL-MCNC: 2.3 MG/L

## 2019-02-26 ENCOUNTER — TELEPHONE (OUTPATIENT)
Dept: HEMATOLOGY/ONCOLOGY | Facility: CLINIC | Age: 84
End: 2019-02-26

## 2019-02-26 NOTE — TELEPHONE ENCOUNTER
Called patient to reschedule 2/28/19 to the morning, no answer, called daughter, daughter called patient and confirmed time change to 10 20 am.

## 2019-02-28 ENCOUNTER — OFFICE VISIT (OUTPATIENT)
Dept: HEMATOLOGY/ONCOLOGY | Facility: CLINIC | Age: 84
End: 2019-02-28
Payer: MEDICARE

## 2019-02-28 ENCOUNTER — EXTERNAL CHRONIC CARE MANAGEMENT (OUTPATIENT)
Dept: PRIMARY CARE CLINIC | Facility: CLINIC | Age: 84
End: 2019-02-28
Payer: MEDICARE

## 2019-02-28 VITALS
BODY MASS INDEX: 22.1 KG/M2 | DIASTOLIC BLOOD PRESSURE: 58 MMHG | WEIGHT: 117.06 LBS | RESPIRATION RATE: 18 BRPM | SYSTOLIC BLOOD PRESSURE: 129 MMHG | HEART RATE: 51 BPM | TEMPERATURE: 98 F | HEIGHT: 61 IN

## 2019-02-28 DIAGNOSIS — D51.0 VITAMIN B12 DEFICIENCY ANEMIA DUE TO INTRINSIC FACTOR DEFICIENCY: Primary | ICD-10-CM

## 2019-02-28 DIAGNOSIS — N18.30 ANEMIA IN STAGE 3 CHRONIC KIDNEY DISEASE: ICD-10-CM

## 2019-02-28 DIAGNOSIS — D63.1 ANEMIA IN STAGE 3 CHRONIC KIDNEY DISEASE: ICD-10-CM

## 2019-02-28 DIAGNOSIS — I10 ESSENTIAL HYPERTENSION: ICD-10-CM

## 2019-02-28 DIAGNOSIS — Z98.890 STATUS POST CAROTID ENDARTERECTOMY: ICD-10-CM

## 2019-02-28 DIAGNOSIS — D50.0 IRON DEFICIENCY ANEMIA DUE TO CHRONIC BLOOD LOSS: ICD-10-CM

## 2019-02-28 PROCEDURE — 99214 PR OFFICE/OUTPT VISIT, EST, LEVL IV, 30-39 MIN: ICD-10-PCS | Mod: S$PBB,,, | Performed by: INTERNAL MEDICINE

## 2019-02-28 PROCEDURE — 99999 PR PBB SHADOW E&M-EST. PATIENT-LVL III: ICD-10-PCS | Mod: PBBFAC,,, | Performed by: INTERNAL MEDICINE

## 2019-02-28 PROCEDURE — 99214 OFFICE O/P EST MOD 30 MIN: CPT | Mod: S$PBB,,, | Performed by: INTERNAL MEDICINE

## 2019-02-28 PROCEDURE — 99999 PR PBB SHADOW E&M-EST. PATIENT-LVL III: CPT | Mod: PBBFAC,,, | Performed by: INTERNAL MEDICINE

## 2019-02-28 PROCEDURE — 99490 PR CHRONIC CARE MGMT, 1ST 20 MIN: ICD-10-PCS | Mod: S$PBB,,, | Performed by: FAMILY MEDICINE

## 2019-02-28 PROCEDURE — 99490 CHRNC CARE MGMT STAFF 1ST 20: CPT | Mod: PBBFAC,PN | Performed by: FAMILY MEDICINE

## 2019-02-28 PROCEDURE — 99490 CHRNC CARE MGMT STAFF 1ST 20: CPT | Mod: S$PBB,,, | Performed by: FAMILY MEDICINE

## 2019-02-28 PROCEDURE — 99213 OFFICE O/P EST LOW 20 MIN: CPT | Mod: PBBFAC,PN | Performed by: INTERNAL MEDICINE

## 2019-02-28 NOTE — PROGRESS NOTES
HISTORY OF PRESENT ILLNESS:  This is a 80-year-old  woman coming in for   followup.  The patient has anemia of chronic disease.  She has had   postmenopausal bleeding.  Endometrial biopsies were negative for malignancy.    She is no longer having any bleeding.  She continues to see Dr. Recinos for her CAD.    The patient is small built,   chronically ill-appearing, with anemia of chronic disease, here for followup.of labs, had stents in her heart and leg by Dr. Recinos, pt is feeling much more active, she has DJD pain that is debilitating but she dosent want any rx.    passed away, has a stress test in July, here with cory , no issues except fallig sometimes , her legs give out, no paliptiations, states she cant stand       PHYSICAL EXAMINATION:  Wt Readings from Last 3 Encounters:   02/28/19 53.1 kg (117 lb 1 oz)   11/15/18 53.5 kg (118 lb)   11/07/18 53.9 kg (118 lb 15 oz)     Temp Readings from Last 3 Encounters:   02/28/19 98.4 °F (36.9 °C)   11/07/18 98.2 °F (36.8 °C) (Oral)   06/28/18 98.1 °F (36.7 °C)     BP Readings from Last 3 Encounters:   02/28/19 (!) 129/58   11/15/18 (!) 146/56   11/07/18 (!) 142/80     Pulse Readings from Last 3 Encounters:   02/28/19 (!) 51   11/15/18 (!) 53   11/07/18 (!) 112     GENERAL:  Chronically ill-appearing woman.  Ambulating using a walker.    Comfortable looking patient.  Patient is in no distress.  Awake, alert and   oriented to time, person and place.  No anxiety, or agitation.  HEENT:  Normal   conjunctivae and eyelids.  WNL.  PERRLA 3 to 4 mm.  No icterus, no pallor, no   congestion, and no discharge noted.   NECK:  Supple.  Trachea is central.  No crepitus.  No JVD or masses.  RESPIRATORY:  No intercostal retractions.  No dullness to percussion.  Chest is   clear to auscultation.  No rales, rhonchi or wheezes.  No crepitus.  Good air   entry bilaterally.  CARDIOVASCULAR:  S1 and S2 are normally heard without murmurs or gallops.  All   peripheral  pulses are present.  ABDOMEN:  Normal abdomen.  No hepatosplenomegaly.  No free fluid.  Bowel sounds   are present.  No hernia noted.  No masses.  No rebound or tenderness.  No   guarding or rigidity.  Umbilicus is midline.  NEUROLOGIC:  Higher functions are appropriate.  No cranial nerve deficits.    Normal gait.  Normal strength.  Motor and sensory functions are normal.   .    LABORATORY DATA:    Lab Results   Component Value Date    WBC 5.78 02/21/2019    HGB 11.7 (L) 02/21/2019    HCT 35.6 (L) 02/21/2019    MCV 92 02/21/2019     02/21/2019     CMP  Sodium   Date Value Ref Range Status   02/21/2019 140 136 - 145 mmol/L Final     Potassium   Date Value Ref Range Status   02/21/2019 4.7 3.5 - 5.1 mmol/L Final     Chloride   Date Value Ref Range Status   02/21/2019 104 95 - 110 mmol/L Final     CO2   Date Value Ref Range Status   02/21/2019 27 23 - 29 mmol/L Final     Glucose   Date Value Ref Range Status   02/21/2019 85 70 - 110 mg/dL Final     BUN, Bld   Date Value Ref Range Status   02/21/2019 25 (H) 8 - 23 mg/dL Final     Creatinine   Date Value Ref Range Status   02/21/2019 1.5 (H) 0.5 - 1.4 mg/dL Final     Calcium   Date Value Ref Range Status   02/21/2019 10.1 8.7 - 10.5 mg/dL Final     Total Protein   Date Value Ref Range Status   02/21/2019 7.3 6.0 - 8.4 g/dL Final     Albumin   Date Value Ref Range Status   02/21/2019 3.8 3.5 - 5.2 g/dL Final     Total Bilirubin   Date Value Ref Range Status   02/21/2019 0.3 0.1 - 1.0 mg/dL Final     Comment:     For infants and newborns, interpretation of results should be based  on gestational age, weight and in agreement with clinical  observations.  Premature Infant recommended reference ranges:  Up to 24 hours.............<8.0 mg/dL  Up to 48 hours............<12.0 mg/dL  3-5 days..................<15.0 mg/dL  6-29 days.................<15.0 mg/dL       Alkaline Phosphatase   Date Value Ref Range Status   02/21/2019 81 55 - 135 U/L Final     AST   Date Value  Ref Range Status   02/21/2019 12 10 - 40 U/L Final     ALT   Date Value Ref Range Status   02/21/2019 6 (L) 10 - 44 U/L Final     Anion Gap   Date Value Ref Range Status   02/21/2019 9 8 - 16 mmol/L Final     eGFR if    Date Value Ref Range Status   02/21/2019 37 (A) >60 mL/min/1.73 m^2 Final     eGFR if non    Date Value Ref Range Status   02/21/2019 32 (A) >60 mL/min/1.73 m^2 Final     Comment:     Calculation used to obtain the estimated glomerular filtration  rate (eGFR) is the CKD-EPI equation.        CMP  PLAN:  Continue to monitor,hgb stable.    pt seems to be falling states sh has ear issues, possibly benign positional vertigo   sometimes when she stands I have offered her antiverrt they will think about it   sees cardiology and pcp   Rest of anemia workup has been   negative so far and only a bone marrow has not been performed yet.  We will   continue to observe only as the numbers are improving.  Return to clinic in 8 months  Mild CKD :stable rtc 6-8 months again for clinical f/u  Cont supplement of iron and b12

## 2019-03-31 ENCOUNTER — EXTERNAL CHRONIC CARE MANAGEMENT (OUTPATIENT)
Dept: PRIMARY CARE CLINIC | Facility: CLINIC | Age: 84
End: 2019-03-31
Payer: MEDICARE

## 2019-03-31 PROCEDURE — 99490 CHRNC CARE MGMT STAFF 1ST 20: CPT | Mod: PBBFAC,PN | Performed by: FAMILY MEDICINE

## 2019-03-31 PROCEDURE — 99490 CHRNC CARE MGMT STAFF 1ST 20: CPT | Mod: S$PBB,,, | Performed by: FAMILY MEDICINE

## 2019-03-31 PROCEDURE — 99490 PR CHRONIC CARE MGMT, 1ST 20 MIN: ICD-10-PCS | Mod: S$PBB,,, | Performed by: FAMILY MEDICINE

## 2019-04-01 ENCOUNTER — OFFICE VISIT (OUTPATIENT)
Dept: FAMILY MEDICINE | Facility: CLINIC | Age: 84
End: 2019-04-01
Payer: MEDICARE

## 2019-04-01 VITALS
WEIGHT: 117.5 LBS | BODY MASS INDEX: 22.19 KG/M2 | HEIGHT: 61 IN | DIASTOLIC BLOOD PRESSURE: 52 MMHG | SYSTOLIC BLOOD PRESSURE: 108 MMHG | HEART RATE: 64 BPM

## 2019-04-01 DIAGNOSIS — I48.0 PAROXYSMAL ATRIAL FIBRILLATION: ICD-10-CM

## 2019-04-01 DIAGNOSIS — I25.9 ISCHEMIC HEART DISEASE DUE TO CORONARY ARTERY OBSTRUCTION: Primary | ICD-10-CM

## 2019-04-01 DIAGNOSIS — G44.209 MUSCLE CONTRACTION HEADACHE: ICD-10-CM

## 2019-04-01 DIAGNOSIS — I24.0 ISCHEMIC HEART DISEASE DUE TO CORONARY ARTERY OBSTRUCTION: Primary | ICD-10-CM

## 2019-04-01 DIAGNOSIS — I10 ESSENTIAL HYPERTENSION: ICD-10-CM

## 2019-04-01 PROCEDURE — 99999 PR PBB SHADOW E&M-EST. PATIENT-LVL III: CPT | Mod: PBBFAC,,, | Performed by: FAMILY MEDICINE

## 2019-04-01 PROCEDURE — 99214 PR OFFICE/OUTPT VISIT, EST, LEVL IV, 30-39 MIN: ICD-10-PCS | Mod: S$PBB,,, | Performed by: FAMILY MEDICINE

## 2019-04-01 PROCEDURE — 99214 OFFICE O/P EST MOD 30 MIN: CPT | Mod: S$PBB,,, | Performed by: FAMILY MEDICINE

## 2019-04-01 PROCEDURE — 99213 OFFICE O/P EST LOW 20 MIN: CPT | Mod: PBBFAC,PN | Performed by: FAMILY MEDICINE

## 2019-04-01 PROCEDURE — 99999 PR PBB SHADOW E&M-EST. PATIENT-LVL III: ICD-10-PCS | Mod: PBBFAC,,, | Performed by: FAMILY MEDICINE

## 2019-04-01 RX ORDER — PRAVASTATIN SODIUM 20 MG/1
20 TABLET ORAL DAILY
Qty: 90 TABLET | Refills: 3 | Status: SHIPPED | OUTPATIENT
Start: 2019-04-01 | End: 2020-03-25 | Stop reason: SDUPTHER

## 2019-04-01 NOTE — PROGRESS NOTES
"Subjective:       Patient ID: Sobeida Mcclain is a 83 y.o. female.    Chief Complaint: Headache (Head "pops" and causes bilat earache and HA. Happens daily. Daughter can hear it pop, poss neck?)    She has been having some headaches on the top of her head.  Sometimes they are triggered by a popping in her neck.  She also complains of some earache especially if she is outside in the wind.  I reviewed her medications and recent lab work.  Late last year she was found to be in atrial fibrillation.  She is currently in sinus rhythm.  She just had an implanted loop recorder placed.  She is not having any symptoms of palpitation or chest pain.    Review of Systems   Constitutional: Negative for fever and unexpected weight change.   Respiratory: Negative for cough and shortness of breath.    Cardiovascular: Negative for chest pain, palpitations and leg swelling.   Neurological: Positive for headaches. Negative for dizziness and weakness.       Objective:     Blood pressure (!) 108/52, pulse 64, height 5' 1" (1.549 m), weight 53.3 kg (117 lb 8.1 oz).      Physical Exam   Constitutional: She appears well-developed and well-nourished. No distress.   HENT:   Scalp including temples are nontender.  TMJs are nontender. She has dentures.  No oral lesions.  Sinuses nontender. Right TM with a previous perforation that has healed.  Left TM is mobile to insufflation.   Neck:   She does have some restriction in neck movement.  Nontender.   Cardiovascular: Regular rhythm.   No murmur heard.  Regular bradycardia   Pulmonary/Chest: Effort normal and breath sounds normal.   Musculoskeletal: She exhibits no edema.       Assessment:       1. 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 Essential hypertension    3. Paroxysmal atrial fibrillation    4. Muscle contraction headache        Plan:       Monitor headache.  She can take periodically Tylenol.    she is at elevated cardiovascular risk and I started her on pravastatin " 20 mg.

## 2019-04-02 ENCOUNTER — TELEPHONE (OUTPATIENT)
Dept: FAMILY MEDICINE | Facility: CLINIC | Age: 84
End: 2019-04-02

## 2019-04-02 NOTE — TELEPHONE ENCOUNTER
----- Message from Meredith Barrett sent at 4/2/2019  1:26 PM CDT -----  Contact: [place]-daughter  Type: Needs Medical Advice    Who Called:  Patient/daughter    Best Call Back Number:[phone]-call this number  Additional Information: pt's daughter will like a call back about some labs Dr Anderson would like copies daughter said to call her not her mom need a call back asap please

## 2019-04-02 NOTE — TELEPHONE ENCOUNTER
Spoke w/ pt's daughter, [place]. Advised that Dr eBrnstein started her on Pravastatin due to pt elevated cardiovascular risk(see last clinic note). Pt is seeing Dr Anderson, Cardio next week and they would like copy of last lipids, etc from 11/7/18 sent to Dr Andreson. Advised her that we would fax it now and to just be sure they bring an updated medication list for Dr Anderson to review as he may want to change or continue statin. She agreed.

## 2019-04-30 ENCOUNTER — EXTERNAL CHRONIC CARE MANAGEMENT (OUTPATIENT)
Dept: PRIMARY CARE CLINIC | Facility: CLINIC | Age: 84
End: 2019-04-30
Payer: MEDICARE

## 2019-04-30 PROCEDURE — 99490 CHRNC CARE MGMT STAFF 1ST 20: CPT | Mod: S$PBB,,, | Performed by: FAMILY MEDICINE

## 2019-04-30 PROCEDURE — 99490 CHRNC CARE MGMT STAFF 1ST 20: CPT | Mod: PBBFAC,PN | Performed by: FAMILY MEDICINE

## 2019-04-30 PROCEDURE — 99490 PR CHRONIC CARE MGMT, 1ST 20 MIN: ICD-10-PCS | Mod: S$PBB,,, | Performed by: FAMILY MEDICINE

## 2019-05-25 RX ORDER — RIVAROXABAN 20 MG/1
TABLET, FILM COATED ORAL
Qty: 90 TABLET | Refills: 1 | Status: SHIPPED | OUTPATIENT
Start: 2019-05-25 | End: 2019-12-01 | Stop reason: SDUPTHER

## 2019-05-31 ENCOUNTER — EXTERNAL CHRONIC CARE MANAGEMENT (OUTPATIENT)
Dept: PRIMARY CARE CLINIC | Facility: CLINIC | Age: 84
End: 2019-05-31
Payer: MEDICARE

## 2019-05-31 PROCEDURE — 99490 PR CHRONIC CARE MGMT, 1ST 20 MIN: ICD-10-PCS | Mod: S$PBB,,, | Performed by: FAMILY MEDICINE

## 2019-05-31 PROCEDURE — 99490 CHRNC CARE MGMT STAFF 1ST 20: CPT | Mod: PBBFAC,PN | Performed by: FAMILY MEDICINE

## 2019-05-31 PROCEDURE — 99490 CHRNC CARE MGMT STAFF 1ST 20: CPT | Mod: S$PBB,,, | Performed by: FAMILY MEDICINE

## 2019-06-06 RX ORDER — METOPROLOL TARTRATE 25 MG/1
TABLET, FILM COATED ORAL
Qty: 180 TABLET | Refills: 0 | Status: SHIPPED | OUTPATIENT
Start: 2019-06-06 | End: 2019-09-05 | Stop reason: SDUPTHER

## 2019-06-30 ENCOUNTER — EXTERNAL CHRONIC CARE MANAGEMENT (OUTPATIENT)
Dept: PRIMARY CARE CLINIC | Facility: CLINIC | Age: 84
End: 2019-06-30
Payer: MEDICARE

## 2019-06-30 PROCEDURE — 99490 PR CHRONIC CARE MGMT, 1ST 20 MIN: ICD-10-PCS | Mod: S$PBB,,, | Performed by: FAMILY MEDICINE

## 2019-06-30 PROCEDURE — 99490 CHRNC CARE MGMT STAFF 1ST 20: CPT | Mod: PBBFAC,PN | Performed by: FAMILY MEDICINE

## 2019-06-30 PROCEDURE — 99490 CHRNC CARE MGMT STAFF 1ST 20: CPT | Mod: S$PBB,,, | Performed by: FAMILY MEDICINE

## 2019-07-08 RX ORDER — SERTRALINE HYDROCHLORIDE 100 MG/1
TABLET, FILM COATED ORAL
Qty: 90 TABLET | Refills: 3 | Status: SHIPPED | OUTPATIENT
Start: 2019-07-08 | End: 2019-10-22 | Stop reason: SDUPTHER

## 2019-07-10 ENCOUNTER — TELEPHONE (OUTPATIENT)
Dept: FAMILY MEDICINE | Facility: CLINIC | Age: 84
End: 2019-07-10

## 2019-07-10 RX ORDER — SERTRALINE HYDROCHLORIDE 100 MG/1
TABLET, FILM COATED ORAL
Qty: 90 TABLET | Refills: 0 | Status: SHIPPED | OUTPATIENT
Start: 2019-07-10 | End: 2019-07-29

## 2019-07-10 NOTE — TELEPHONE ENCOUNTER
----- Message from Tammy Stokes sent at 7/10/2019  2:46 PM CDT -----  Contact: Purvi Long (Sonal) [phone]  Purvi Long (Sonal) [phone]  Please call concerning sertraline (ZOLOFT) 100 MG tablet, need prior auth.

## 2019-07-15 ENCOUNTER — PATIENT OUTREACH (OUTPATIENT)
Dept: ADMINISTRATIVE | Facility: HOSPITAL | Age: 84
End: 2019-07-15

## 2019-07-15 DIAGNOSIS — M94.9 DISORDER OF BONE AND ARTICULAR CARTILAGE: Primary | ICD-10-CM

## 2019-07-15 DIAGNOSIS — M89.9 DISORDER OF BONE AND ARTICULAR CARTILAGE: Primary | ICD-10-CM

## 2019-07-29 ENCOUNTER — OFFICE VISIT (OUTPATIENT)
Dept: FAMILY MEDICINE | Facility: CLINIC | Age: 84
End: 2019-07-29
Payer: MEDICARE

## 2019-07-29 ENCOUNTER — HOSPITAL ENCOUNTER (OUTPATIENT)
Dept: RADIOLOGY | Facility: HOSPITAL | Age: 84
Discharge: HOME | End: 2019-07-29
Attending: FAMILY MEDICINE
Payer: MEDICARE

## 2019-07-29 VITALS
WEIGHT: 112.63 LBS | HEART RATE: 62 BPM | SYSTOLIC BLOOD PRESSURE: 128 MMHG | BODY MASS INDEX: 21.27 KG/M2 | HEIGHT: 61 IN | TEMPERATURE: 98 F | DIASTOLIC BLOOD PRESSURE: 58 MMHG

## 2019-07-29 DIAGNOSIS — I10 ESSENTIAL HYPERTENSION: Primary | ICD-10-CM

## 2019-07-29 DIAGNOSIS — M94.9 DISORDER OF BONE AND ARTICULAR CARTILAGE: ICD-10-CM

## 2019-07-29 DIAGNOSIS — M89.9 DISORDER OF BONE AND ARTICULAR CARTILAGE: ICD-10-CM

## 2019-07-29 DIAGNOSIS — R39.15 URGENCY OF URINATION: ICD-10-CM

## 2019-07-29 PROCEDURE — 99213 OFFICE O/P EST LOW 20 MIN: CPT | Mod: PBBFAC,PN,25 | Performed by: FAMILY MEDICINE

## 2019-07-29 PROCEDURE — 77080 DXA BONE DENSITY AXIAL: CPT | Mod: TC,PO

## 2019-07-29 PROCEDURE — 77080 DXA BONE DENSITY AXIAL: CPT | Mod: 26,,, | Performed by: RADIOLOGY

## 2019-07-29 PROCEDURE — 99214 OFFICE O/P EST MOD 30 MIN: CPT | Mod: S$PBB,,, | Performed by: FAMILY MEDICINE

## 2019-07-29 PROCEDURE — 77080 DEXA BONE DENSITY SPINE HIP: ICD-10-PCS | Mod: 26,,, | Performed by: RADIOLOGY

## 2019-07-29 PROCEDURE — 99999 PR PBB SHADOW E&M-EST. PATIENT-LVL III: CPT | Mod: PBBFAC,,, | Performed by: FAMILY MEDICINE

## 2019-07-29 PROCEDURE — 99214 PR OFFICE/OUTPT VISIT, EST, LEVL IV, 30-39 MIN: ICD-10-PCS | Mod: S$PBB,,, | Performed by: FAMILY MEDICINE

## 2019-07-29 PROCEDURE — 99999 PR PBB SHADOW E&M-EST. PATIENT-LVL III: ICD-10-PCS | Mod: PBBFAC,,, | Performed by: FAMILY MEDICINE

## 2019-07-29 NOTE — PROGRESS NOTES
"Subjective:       Patient ID: Sobeida Mcclain is a 84 y.o. female.    Chief Complaint: Follow-up (3 mo f/u) and Mole (Check mole on R side of nose and R temple)    She is here with her 2 daughters.  She has had an occasional fall.  She ambulates slowly with a walker and/or a cane.  She still gets some popping in the top of her head but does not complain of headache.  She has always been a picky eater.  She has lost a little weight.  She relates loose bowels with oatmeal and with Ensure.  She started drinking Ensure when her  needed a nutritional supplement.  She sees Hematology for mild anemia.  I reviewed her lab work and she did have a mild elevation of her creatinine.  She has a mild persistent cough.  She takes Dexilant for esophagitis and has had to have esophageal dilation.  She currently does not complain of heartburn or dysphagia.  Sometimes she has some urinary urgency.  She has 2 skin lesions that she was concerned about.  She had recent cataract surgery in is still using eyedrops.  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 Negative for fatigue and fever.   Respiratory: Positive for cough. Negative for shortness of breath.    Cardiovascular: Negative for chest pain, palpitations and leg swelling.   Gastrointestinal: Negative for abdominal pain.   Genitourinary: Positive for urgency.       Objective:     Blood pressure (!) 128/58, pulse 62, temperature 98 °F (36.7 °C), temperature source Oral, height 5' 1" (1.549 m), weight 51.1 kg (112 lb 10.5 oz).      Physical Exam   Constitutional:   She is thin and walks slowly with a cane.  She tends to keep her left knee flexed   Neck: No thyromegaly present.   Cardiovascular: Normal rate.   No murmur heard.  Pulmonary/Chest: Effort normal and breath sounds normal.   Abdominal: She exhibits no distension and no mass. There is no " tenderness.   Musculoskeletal: She exhibits no edema.   Lymphadenopathy:     She has no cervical adenopathy.   Neurological: She is alert.   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  It nearly disappears with pressure.  She has a non distinct actinic keratoses in the right temple area.  Treated with cryotherapy.       Assessment:       1. Essential hypertension    2. Urgency of urination        Plan:       DEXA scan.  She has follow-up labs scheduled for October.  Cryotherapy to actinic keratoses.Cryotherapy to lesion as described.  Liquid nitrogen with 2 freeze thaw cycles. Intent is total destruction. Home care discussed.       urinalysis today.  Reduce Dexilant to every other day and see if that is causing decreased appetite and loose bowels.  Trial of Claritin for her cough.  We will monitor her weight.

## 2019-08-10 ENCOUNTER — TELEPHONE (OUTPATIENT)
Dept: GASTROENTEROLOGY | Facility: CLINIC | Age: 84
End: 2019-08-10

## 2019-08-10 DIAGNOSIS — Z87.19 HISTORY OF GASTROESOPHAGEAL REFLUX (GERD): ICD-10-CM

## 2019-08-10 DIAGNOSIS — R13.14 PHARYNGOESOPHAGEAL DYSPHAGIA: ICD-10-CM

## 2019-08-12 RX ORDER — DEXLANSOPRAZOLE 30 MG/1
CAPSULE, DELAYED RELEASE ORAL
Qty: 90 CAPSULE | Refills: 0 | Status: SHIPPED | OUTPATIENT
Start: 2019-08-12 | End: 2019-09-30

## 2019-08-12 NOTE — TELEPHONE ENCOUNTER
Spoke with pt's daughter. Informed of message per ERICK uJlio NP. Daughter stated that per Dr. Bernstein pt does not need to take this as much. Daughter informed that when next refill is due to eithr make f/u or send to Dr. Bernstein. Daughter verbalized understanding.

## 2019-08-12 NOTE — TELEPHONE ENCOUNTER
Please inform the patient that I refilled the prescription for dexilant and she is due for a follow-up visit (last seen a year ago) for continued evaluation and management.  Thanks  KIERRA

## 2019-08-31 ENCOUNTER — EXTERNAL CHRONIC CARE MANAGEMENT (OUTPATIENT)
Dept: PRIMARY CARE CLINIC | Facility: CLINIC | Age: 84
End: 2019-08-31
Payer: MEDICARE

## 2019-08-31 PROCEDURE — 99490 PR CHRONIC CARE MGMT, 1ST 20 MIN: ICD-10-PCS | Mod: S$PBB,,, | Performed by: FAMILY MEDICINE

## 2019-08-31 PROCEDURE — 99490 CHRNC CARE MGMT STAFF 1ST 20: CPT | Mod: S$PBB,,, | Performed by: FAMILY MEDICINE

## 2019-08-31 PROCEDURE — 99490 CHRNC CARE MGMT STAFF 1ST 20: CPT | Mod: PBBFAC,PN | Performed by: FAMILY MEDICINE

## 2019-09-05 RX ORDER — METOPROLOL TARTRATE 25 MG/1
TABLET, FILM COATED ORAL
Qty: 180 TABLET | Refills: 3 | Status: SHIPPED | OUTPATIENT
Start: 2019-09-05

## 2019-09-30 ENCOUNTER — OFFICE VISIT (OUTPATIENT)
Dept: FAMILY MEDICINE | Facility: CLINIC | Age: 84
End: 2019-09-30
Payer: MEDICARE

## 2019-09-30 ENCOUNTER — EXTERNAL CHRONIC CARE MANAGEMENT (OUTPATIENT)
Dept: PRIMARY CARE CLINIC | Facility: CLINIC | Age: 84
End: 2019-09-30
Payer: MEDICARE

## 2019-09-30 VITALS
DIASTOLIC BLOOD PRESSURE: 50 MMHG | BODY MASS INDEX: 21.36 KG/M2 | SYSTOLIC BLOOD PRESSURE: 122 MMHG | HEART RATE: 52 BPM | WEIGHT: 113.13 LBS | TEMPERATURE: 98 F | HEIGHT: 61 IN

## 2019-09-30 DIAGNOSIS — H90.3 SENSORINEURAL HEARING LOSS (SNHL) OF BOTH EARS: ICD-10-CM

## 2019-09-30 DIAGNOSIS — I10 ESSENTIAL HYPERTENSION: Primary | ICD-10-CM

## 2019-09-30 PROCEDURE — 99999 PR PBB SHADOW E&M-EST. PATIENT-LVL IV: CPT | Mod: PBBFAC,,, | Performed by: FAMILY MEDICINE

## 2019-09-30 PROCEDURE — 99214 OFFICE O/P EST MOD 30 MIN: CPT | Mod: PBBFAC,PN | Performed by: FAMILY MEDICINE

## 2019-09-30 PROCEDURE — 99490 CHRNC CARE MGMT STAFF 1ST 20: CPT | Mod: PBBFAC,PN | Performed by: FAMILY MEDICINE

## 2019-09-30 PROCEDURE — 99999 PR PBB SHADOW E&M-EST. PATIENT-LVL IV: ICD-10-PCS | Mod: PBBFAC,,, | Performed by: FAMILY MEDICINE

## 2019-09-30 PROCEDURE — 99214 OFFICE O/P EST MOD 30 MIN: CPT | Mod: S$PBB,,, | Performed by: FAMILY MEDICINE

## 2019-09-30 PROCEDURE — 99490 CHRNC CARE MGMT STAFF 1ST 20: CPT | Mod: S$PBB,,, | Performed by: FAMILY MEDICINE

## 2019-09-30 PROCEDURE — 99490 PR CHRONIC CARE MGMT, 1ST 20 MIN: ICD-10-PCS | Mod: S$PBB,,, | Performed by: FAMILY MEDICINE

## 2019-09-30 PROCEDURE — 99214 PR OFFICE/OUTPT VISIT, EST, LEVL IV, 30-39 MIN: ICD-10-PCS | Mod: S$PBB,,, | Performed by: FAMILY MEDICINE

## 2019-09-30 RX ORDER — LANOLIN ALCOHOL/MO/W.PET/CERES
500 CREAM (GRAM) TOPICAL DAILY
COMMUNITY

## 2019-09-30 NOTE — PROGRESS NOTES
"Subjective:       Patient ID: Sobeida Mcclain is a 84 y.o. female.    Chief Complaint: Hearing Loss (Hearing loss, bilat ear pain)    She is here with her daughter.  She complains of worsening hearing bilaterally.  This has been going on for several years.  Two and half years ago she was evaluated by ENT.  They apparently discussed hearing aids with her.  She has tried over-the-counter hearing aids that her  had.  They were apparently uncomfortable for her to wear them both.  She may be willing to try again.  She has a new great grandchild on the way.  I discussed the possibility of Meigs's L with her.  She is not sure that she will be around the grandchild very often.  She has a frequent persistent cough that is worse in the morning.  It seems to be worse an air-conditioned environment.  She has no history of COPD or asthma.  She is not a smoker.    Review of Systems   Constitutional: Negative for fever and unexpected weight change.   HENT: Positive for hearing loss.    Respiratory: Positive for cough. Negative for chest tightness, shortness of breath and wheezing.    Cardiovascular: Negative for chest pain and palpitations.   Neurological:        She uses a cane and is not as steady on her feet.  She does not report any recent falls.       Objective:     Blood pressure (!) 122/50, pulse (!) 52, temperature 97.6 °F (36.4 °C), temperature source Oral, height 5' 1" (1.549 m), weight 51.3 kg (113 lb 1.5 oz).      Physical Exam   Constitutional:   Thin older lady.  No distress.  Hard of hearing.   HENT:   Frequent coughing that sounds upper airway.  Mild nasal congestion. Right TM shows scarring.  Both TMs move somewhat with insufflation.   Eyes: Conjunctivae are normal.   Neck: No thyromegaly present.   Cardiovascular: Normal rate and regular rhythm.   Pulmonary/Chest: Effort normal and breath sounds normal.   Musculoskeletal: She exhibits no edema.   She is a little unstable stepping up onto the table.  She " does use a cane.   Lymphadenopathy:     She has no cervical adenopathy.   Neurological: She is alert.       Assessment:       1. Essential hypertension    2. Sensorineural hearing loss (SNHL) of both ears        Plan:       I discussed with her the possibility of repeat ENT consult.  I gave her written prescription for Adacel that she could get at a pharmacy if she desires.  Follow up with me once or twice a year.  She also sees Cardiology.

## 2019-10-22 ENCOUNTER — TELEPHONE (OUTPATIENT)
Dept: FAMILY MEDICINE | Facility: CLINIC | Age: 84
End: 2019-10-22

## 2019-10-22 NOTE — TELEPHONE ENCOUNTER
----- Message from Penny Glalo sent at 10/22/2019 11:02 AM CDT -----  Contact: Purvi Smithjeromy (Daughter)  Type: Needs Medical Advice    Who Called:  Purvi Parkshari (Daughter)  Best Call Back Number: [place] at   Additional Information: Calling to speak with the Nurse about the patient trying to wean her off of sertraline (ZOLOFT) 100 MG tablet. [place] wants to make sure it's ok for the patient to not take it anymore. Please advise

## 2019-10-22 NOTE — TELEPHONE ENCOUNTER
Pt states she does not feel she needs the zoloft anymore. She wants to wean off of it and is requesting a tapering schedule

## 2019-10-23 RX ORDER — SERTRALINE HYDROCHLORIDE 100 MG/1
100 TABLET, FILM COATED ORAL DAILY
Qty: 90 TABLET | Refills: 1 | Status: SHIPPED | OUTPATIENT
Start: 2019-10-23 | End: 2020-12-21

## 2019-10-31 ENCOUNTER — LAB VISIT (OUTPATIENT)
Dept: LAB | Facility: HOSPITAL | Age: 84
End: 2019-10-31
Attending: INTERNAL MEDICINE
Payer: MEDICARE

## 2019-10-31 ENCOUNTER — EXTERNAL CHRONIC CARE MANAGEMENT (OUTPATIENT)
Dept: PRIMARY CARE CLINIC | Facility: CLINIC | Age: 84
End: 2019-10-31
Payer: MEDICARE

## 2019-10-31 DIAGNOSIS — D50.0 IRON DEFICIENCY ANEMIA DUE TO CHRONIC BLOOD LOSS: ICD-10-CM

## 2019-10-31 DIAGNOSIS — Z98.890 STATUS POST CAROTID ENDARTERECTOMY: ICD-10-CM

## 2019-10-31 DIAGNOSIS — N18.30 ANEMIA IN STAGE 3 CHRONIC KIDNEY DISEASE: ICD-10-CM

## 2019-10-31 DIAGNOSIS — D51.0 VITAMIN B12 DEFICIENCY ANEMIA DUE TO INTRINSIC FACTOR DEFICIENCY: ICD-10-CM

## 2019-10-31 DIAGNOSIS — D63.1 ANEMIA IN STAGE 3 CHRONIC KIDNEY DISEASE: ICD-10-CM

## 2019-10-31 DIAGNOSIS — I10 ESSENTIAL HYPERTENSION: ICD-10-CM

## 2019-10-31 LAB
ALBUMIN SERPL BCP-MCNC: 4.4 G/DL (ref 3.5–5.2)
ALP SERPL-CCNC: 133 U/L (ref 38–145)
ALT SERPL W/O P-5'-P-CCNC: 12 U/L (ref 10–44)
ANION GAP SERPL CALC-SCNC: 8 MMOL/L (ref 8–16)
AST SERPL-CCNC: 22 U/L (ref 14–36)
BASOPHILS # BLD AUTO: 0.05 K/UL (ref 0–0.2)
BASOPHILS NFR BLD: 0.8 % (ref 0–1.9)
BILIRUB SERPL-MCNC: 0.5 MG/DL (ref 0.2–1.3)
BUN SERPL-MCNC: 23 MG/DL (ref 7–18)
CALCIUM SERPL-MCNC: 9.6 MG/DL (ref 8.4–10.2)
CHLORIDE SERPL-SCNC: 102 MMOL/L (ref 95–110)
CO2 SERPL-SCNC: 29 MMOL/L (ref 22–31)
CREAT SERPL-MCNC: 1.26 MG/DL (ref 0.5–1.4)
DIFFERENTIAL METHOD: ABNORMAL
EOSINOPHIL # BLD AUTO: 0.4 K/UL (ref 0–0.5)
EOSINOPHIL NFR BLD: 5.5 % (ref 0–8)
ERYTHROCYTE [DISTWIDTH] IN BLOOD BY AUTOMATED COUNT: 13.4 % (ref 11.5–14.5)
EST. GFR  (AFRICAN AMERICAN): 46 ML/MIN/1.73 M^2
EST. GFR  (NON AFRICAN AMERICAN): 40 ML/MIN/1.73 M^2
FERRITIN SERPL-MCNC: 382 NG/ML (ref 12–264)
GLUCOSE SERPL-MCNC: 96 MG/DL (ref 70–110)
HCT VFR BLD AUTO: 35.9 % (ref 37–48.5)
HGB BLD-MCNC: 11.8 G/DL (ref 12–16)
IMM GRANULOCYTES # BLD AUTO: 0.01 K/UL (ref 0–0.04)
IMM GRANULOCYTES NFR BLD AUTO: 0.2 % (ref 0–0.5)
IRON SATN MFR SERPL: 25 % (ref 20–50)
IRON SERPL-MCNC: 71 UG/DL (ref 30–160)
LYMPHOCYTES # BLD AUTO: 1.6 K/UL (ref 1–4.8)
LYMPHOCYTES NFR BLD: 24.1 % (ref 18–48)
MCH RBC QN AUTO: 30.6 PG (ref 27–31)
MCHC RBC AUTO-ENTMCNC: 32.9 G/DL (ref 32–36)
MCV RBC AUTO: 93 FL (ref 82–98)
MONOCYTES # BLD AUTO: 0.6 K/UL (ref 0.3–1)
MONOCYTES NFR BLD: 9.1 % (ref 4–15)
NEUTROPHILS # BLD AUTO: 4 K/UL (ref 1.8–7.7)
NEUTROPHILS NFR BLD: 60.3 % (ref 38–73)
NRBC BLD-RTO: 0 /100 WBC
PLATELET # BLD AUTO: 285 K/UL (ref 150–350)
PMV BLD AUTO: 9.3 FL (ref 9.2–12.9)
POTASSIUM SERPL-SCNC: 4.5 MMOL/L (ref 3.5–5.1)
PROT SERPL-MCNC: 7.8 G/DL (ref 6–8.4)
RBC # BLD AUTO: 3.86 M/UL (ref 4–5.4)
SODIUM SERPL-SCNC: 139 MMOL/L (ref 136–145)
TOTAL IRON BINDING CAPACITY: 281 UG/DL (ref 265–497)
VIT B12 SERPL-MCNC: 335 PG/ML (ref 210–950)
WBC # BLD AUTO: 6.6 K/UL (ref 3.9–12.7)

## 2019-10-31 PROCEDURE — 82607 VITAMIN B-12: CPT | Mod: PN

## 2019-10-31 PROCEDURE — 83540 ASSAY OF IRON: CPT | Mod: PN

## 2019-10-31 PROCEDURE — 80053 COMPREHEN METABOLIC PANEL: CPT | Mod: PN

## 2019-10-31 PROCEDURE — 82728 ASSAY OF FERRITIN: CPT | Mod: PN

## 2019-10-31 PROCEDURE — 80053 COMPREHEN METABOLIC PANEL: CPT

## 2019-10-31 PROCEDURE — 85025 COMPLETE CBC W/AUTO DIFF WBC: CPT | Mod: PN

## 2019-10-31 PROCEDURE — 82607 VITAMIN B-12: CPT

## 2019-10-31 PROCEDURE — 99490 CHRNC CARE MGMT STAFF 1ST 20: CPT | Mod: PBBFAC,PN | Performed by: FAMILY MEDICINE

## 2019-10-31 PROCEDURE — 36415 COLL VENOUS BLD VENIPUNCTURE: CPT | Mod: PN

## 2019-10-31 PROCEDURE — 99490 PR CHRONIC CARE MGMT, 1ST 20 MIN: ICD-10-PCS | Mod: S$PBB,,, | Performed by: FAMILY MEDICINE

## 2019-10-31 PROCEDURE — 82728 ASSAY OF FERRITIN: CPT

## 2019-10-31 PROCEDURE — 99490 CHRNC CARE MGMT STAFF 1ST 20: CPT | Mod: S$PBB,,, | Performed by: FAMILY MEDICINE

## 2019-10-31 PROCEDURE — 83540 ASSAY OF IRON: CPT

## 2019-10-31 PROCEDURE — 85025 COMPLETE CBC W/AUTO DIFF WBC: CPT

## 2019-11-02 LAB — STFR SERPL-MCNC: 2.1 MG/L (ref 1.8–4.6)

## 2019-11-07 ENCOUNTER — TELEPHONE (OUTPATIENT)
Dept: HEMATOLOGY/ONCOLOGY | Facility: CLINIC | Age: 84
End: 2019-11-07

## 2019-11-07 NOTE — TELEPHONE ENCOUNTER
Spoke with Pt's daughter to notify that Dr Steven was out ill and her moms appt needed to be r/s. Pt was r/s to 11/21/19 @ 1100. Pt's daughter verbalized understanding.

## 2019-11-20 ENCOUNTER — TELEPHONE (OUTPATIENT)
Dept: HEMATOLOGY/ONCOLOGY | Facility: CLINIC | Age: 84
End: 2019-11-20

## 2019-11-20 NOTE — TELEPHONE ENCOUNTER
Attempted to contact pt to r/s appt due to Dr Steven not in clinic tomorrow. Left message with contact number.

## 2019-11-21 ENCOUNTER — TELEPHONE (OUTPATIENT)
Dept: HEMATOLOGY/ONCOLOGY | Facility: CLINIC | Age: 84
End: 2019-11-21

## 2019-11-21 DIAGNOSIS — N18.30 ANEMIA IN STAGE 3 CHRONIC KIDNEY DISEASE: ICD-10-CM

## 2019-11-21 DIAGNOSIS — D51.0 VITAMIN B12 DEFICIENCY ANEMIA DUE TO INTRINSIC FACTOR DEFICIENCY: Primary | ICD-10-CM

## 2019-11-21 DIAGNOSIS — D63.1 ANEMIA IN STAGE 3 CHRONIC KIDNEY DISEASE: ICD-10-CM

## 2019-11-21 NOTE — TELEPHONE ENCOUNTER
----- Message from Ellie Steven MD sent at 11/20/2019  5:33 PM CST -----  Her counts are stable so she can be rechecked in about 6 months with cbc, cmp, iron studies and b12.   please tell her to take a SL b12 3/week 1000 mcg as her b12 needs a bot of help

## 2019-11-21 NOTE — TELEPHONE ENCOUNTER
Spoke with Pt's daughter to notify her that Dr Steven reviewed pt's labs and all her counts were stable per Dr Steven. Pt to rtc in 6 months with labs. Pt to take her B12 SL 3/week 1000mcg due to her low B12. Pt's daughter verbalized understanding. Pt's appt will be seen on pt portal. Pt's daughter asked that pt's lab and f/u to be scheduled on the same day.

## 2019-11-30 ENCOUNTER — EXTERNAL CHRONIC CARE MANAGEMENT (OUTPATIENT)
Dept: PRIMARY CARE CLINIC | Facility: CLINIC | Age: 84
End: 2019-11-30
Payer: MEDICARE

## 2019-11-30 PROCEDURE — 99490 PR CHRONIC CARE MGMT, 1ST 20 MIN: ICD-10-PCS | Mod: S$PBB,,, | Performed by: FAMILY MEDICINE

## 2019-11-30 PROCEDURE — 99490 CHRNC CARE MGMT STAFF 1ST 20: CPT | Mod: S$PBB,,, | Performed by: FAMILY MEDICINE

## 2019-11-30 PROCEDURE — 99490 CHRNC CARE MGMT STAFF 1ST 20: CPT | Mod: PBBFAC,PN | Performed by: FAMILY MEDICINE

## 2019-12-02 RX ORDER — RIVAROXABAN 20 MG/1
TABLET, FILM COATED ORAL
Qty: 90 TABLET | Refills: 3 | Status: SHIPPED | OUTPATIENT
Start: 2019-12-02 | End: 2020-12-21

## 2019-12-04 ENCOUNTER — HOSPITAL ENCOUNTER (OUTPATIENT)
Dept: RADIOLOGY | Facility: HOSPITAL | Age: 84
Discharge: HOME | End: 2019-12-04
Attending: NURSE PRACTITIONER
Payer: MEDICARE

## 2019-12-04 ENCOUNTER — CLINICAL SUPPORT (OUTPATIENT)
Dept: AUDIOLOGY | Facility: CLINIC | Age: 84
End: 2019-12-04
Payer: MEDICARE

## 2019-12-04 ENCOUNTER — OFFICE VISIT (OUTPATIENT)
Dept: OTOLARYNGOLOGY | Facility: CLINIC | Age: 84
End: 2019-12-04
Payer: MEDICARE

## 2019-12-04 VITALS — HEIGHT: 61 IN | BODY MASS INDEX: 21.36 KG/M2 | WEIGHT: 113.13 LBS

## 2019-12-04 DIAGNOSIS — H93.293 IMPAIRED AUDITORY DISCRIMINATION, BILATERAL: ICD-10-CM

## 2019-12-04 DIAGNOSIS — R05.3 CHRONIC COUGH: ICD-10-CM

## 2019-12-04 DIAGNOSIS — H93.13 TINNITUS, BILATERAL: ICD-10-CM

## 2019-12-04 DIAGNOSIS — R05.3 CHRONIC COUGH: Primary | ICD-10-CM

## 2019-12-04 DIAGNOSIS — H90.3 BILATERAL SENSORINEURAL HEARING LOSS: Primary | ICD-10-CM

## 2019-12-04 DIAGNOSIS — H90.3 BILATERAL SENSORINEURAL HEARING LOSS: ICD-10-CM

## 2019-12-04 PROCEDURE — 99214 OFFICE O/P EST MOD 30 MIN: CPT | Mod: PBBFAC,25,27,PO | Performed by: NURSE PRACTITIONER

## 2019-12-04 PROCEDURE — 99999 PR PBB SHADOW E&M-EST. PATIENT-LVL I: ICD-10-PCS | Mod: PBBFAC,,,

## 2019-12-04 PROCEDURE — 71046 X-RAY EXAM CHEST 2 VIEWS: CPT | Mod: 26,,, | Performed by: RADIOLOGY

## 2019-12-04 PROCEDURE — 70220 X-RAY EXAM OF SINUSES: CPT | Mod: TC,FY,PO

## 2019-12-04 PROCEDURE — 1159F PR MEDICATION LIST DOCUMENTED IN MEDICAL RECORD: ICD-10-PCS | Mod: ,,, | Performed by: NURSE PRACTITIONER

## 2019-12-04 PROCEDURE — 92557 COMPREHENSIVE HEARING TEST: CPT | Mod: PBBFAC,PO | Performed by: AUDIOLOGIST-HEARING AID FITTER

## 2019-12-04 PROCEDURE — 70220 XR SINUSES MIN 3 VIEWS: ICD-10-PCS | Mod: 26,,, | Performed by: RADIOLOGY

## 2019-12-04 PROCEDURE — 99214 OFFICE O/P EST MOD 30 MIN: CPT | Mod: S$PBB,,, | Performed by: NURSE PRACTITIONER

## 2019-12-04 PROCEDURE — 99999 PR PBB SHADOW E&M-EST. PATIENT-LVL IV: ICD-10-PCS | Mod: PBBFAC,,, | Performed by: NURSE PRACTITIONER

## 2019-12-04 PROCEDURE — 71046 X-RAY EXAM CHEST 2 VIEWS: CPT | Mod: TC,FY,PO

## 2019-12-04 PROCEDURE — 99214 PR OFFICE/OUTPT VISIT, EST, LEVL IV, 30-39 MIN: ICD-10-PCS | Mod: S$PBB,,, | Performed by: NURSE PRACTITIONER

## 2019-12-04 PROCEDURE — 1159F MED LIST DOCD IN RCRD: CPT | Mod: ,,, | Performed by: NURSE PRACTITIONER

## 2019-12-04 PROCEDURE — 70220 X-RAY EXAM OF SINUSES: CPT | Mod: 26,,, | Performed by: RADIOLOGY

## 2019-12-04 PROCEDURE — 1126F AMNT PAIN NOTED NONE PRSNT: CPT | Mod: ,,, | Performed by: NURSE PRACTITIONER

## 2019-12-04 PROCEDURE — 99999 PR PBB SHADOW E&M-EST. PATIENT-LVL IV: CPT | Mod: PBBFAC,,, | Performed by: NURSE PRACTITIONER

## 2019-12-04 PROCEDURE — 92567 TYMPANOMETRY: CPT | Mod: PBBFAC,PO | Performed by: AUDIOLOGIST-HEARING AID FITTER

## 2019-12-04 PROCEDURE — 1126F PR PAIN SEVERITY QUANTIFIED, NO PAIN PRESENT: ICD-10-PCS | Mod: ,,, | Performed by: NURSE PRACTITIONER

## 2019-12-04 PROCEDURE — 99211 OFF/OP EST MAY X REQ PHY/QHP: CPT | Mod: PBBFAC,PO,25

## 2019-12-04 PROCEDURE — 99999 PR PBB SHADOW E&M-EST. PATIENT-LVL I: CPT | Mod: PBBFAC,,,

## 2019-12-04 PROCEDURE — 71046 XR CHEST PA AND LATERAL: ICD-10-PCS | Mod: 26,,, | Performed by: RADIOLOGY

## 2019-12-04 NOTE — PROGRESS NOTES
Subjective:       Patient ID: Sobeida Mcclain is a 84 y.o. female.    Chief Complaint: Hearing Loss and Cough    HPI   Patient has h/o mild to moderately-severe SNHL. She returns today for audiogram. Her daughter states her hearing is worsening but she cannot afford hearing aids. Additionally daughter states patient had chronic cough X several years. Radhaosbaldo believes it is allergy related as patient has cats and dogs. She does not take anything for allergies because she does not like the way they make her feel.     Review of Systems   Constitutional: Negative.    HENT: Positive for rhinorrhea and sneezing.         Frequent throat clearing  Frequent sensation of phlegm in the back of her throat   Eyes: Positive for discharge, redness and itching.   Respiratory: Positive for cough and choking.    Cardiovascular: Negative.    Gastrointestinal: Negative.    Musculoskeletal: Negative.    Skin: Negative.    Neurological: Negative.    Hematological: Negative.    Psychiatric/Behavioral: Negative.        Objective:      Physical Exam   Constitutional: She is oriented to person, place, and time. Vital signs are normal. She appears well-developed and well-nourished. She is cooperative. She does not appear ill. No distress.   HENT:   Head: Normocephalic and atraumatic.   Right Ear: Hearing, tympanic membrane, external ear and ear canal normal. Tympanic membrane is not erythematous. No middle ear effusion.   Left Ear: Hearing, tympanic membrane, external ear and ear canal normal. Tympanic membrane is not erythematous.  No middle ear effusion.   Nose: Nose normal.   Mouth/Throat: Uvula is midline, oropharynx is clear and moist and mucous membranes are normal. She has dentures.   Eyes: EOM and lids are normal. Right eye exhibits no discharge. Left eye exhibits no discharge. No scleral icterus.   Neck: Trachea normal and normal range of motion. Neck supple. No tracheal deviation present.   Cardiovascular: Normal rate.    Pulmonary/Chest: Effort normal. No stridor. No respiratory distress. She has no wheezes.   Musculoskeletal: Normal range of motion.   Neurological: She is alert and oriented to person, place, and time. She has normal strength. Coordination and gait normal.   Skin: Skin is warm, dry and intact. She is not diaphoretic. No cyanosis. No pallor.   Psychiatric: She has a normal mood and affect. Her speech is normal and behavior is normal. Judgment and thought content normal. Cognition and memory are normal.   Nursing note and vitals reviewed.      Assessment:       1. Chronic cough      Fairly symmetric sloping SNHL  Impaired auditory discrimination  Plan:     Sinus and chest x-rays today    PATIENT IS MEDICALLY CLEARED FOR HEARING AIDS.   Today's audiogram reveals the patient is a candidate for amplification. Audiogram is reviewed in detail with the patient. The audiologist's recommendation that the patient have amplification/hearing aids is discussed and questions answered. Patient has been given information by the audiologist on how to schedule a hearing aid consultation. Patient is encouraged to wear ear protection in loud noise and return annually for hearing test. Return to clinic as needed for further ENT concerns.

## 2019-12-04 NOTE — PATIENT INSTRUCTIONS
Some of the Top Considerations for Chronic Cough:     1. Nasal allergies -- Typical constellation of symptoms seen with nasal allergies: itchy, red, watery eyes; itchy, red, watery nose; excessive sneezing; excessive stuffiness. If this one best describes your current state, then discuss with your primary care provider whether you should see an allergist or take daily allergy medications.     2. Sinus Infection -- Typical constellation of symptoms seen with acute bacterial sinus infection are:  Green-gold, foul-smelling, foul-tasting mucus from nose and throat, inability to breathe through nose, inability to smell or taste well, facial pain and swelling, dental pain, headaches around eyes, sore throat and productive cough. If this one best describes your current state, then let's get sinus imaging to rule out infection.     3.  Silent reflux -- Typical constellation of symptoms seen with silent reflux: post-nasal drip sensation with absence of significant runny nose or nasal congestion, sensation of thick or too much mucus in the back of throat, raspy voice, frequent throat clearing, lump in the back of throat, frequent sore throats. If this one best describes your current state, discuss with your primary care provider whether you should see a gastroenterologist or take daily reflux medications. Your GI doctor may want to do an Upper GI or obtain a barium swallow or pH monitoring test.

## 2019-12-04 NOTE — PROGRESS NOTES
Sobeida Mcclain was seen 12/04/2019 for an audiological evaluation. Patient complains of hearing loss. Pt reports a family Hx of hearing loss. She has tried OTC hearing aids from [employer] with not much help. I explained the difference between overall amplifiers vs programmable hearing aids.     Results reveal a bilateral moderate-to-severe sensorineural hearing loss.    Speech Reception Thresholds were  45 dBHL for the right ear and 35 dBHL for the left ear.    Word recognition scores were good for the right ear and good for the left ear.   Tympanograms were Type A for the right ear and Type A for the left ear.    Audiogram results were reviewed in detail with patient and all questions were answered. Results will be reviewed by ENT at the completion of this note. Recommend binaural amplification pending medical clearance, hearing test in one year due to family Hx of hearing loss and hearing protection in loud noise. Gave pt the HA assistance paperwork.

## 2019-12-31 ENCOUNTER — EXTERNAL CHRONIC CARE MANAGEMENT (OUTPATIENT)
Dept: PRIMARY CARE CLINIC | Facility: CLINIC | Age: 84
End: 2019-12-31
Payer: MEDICARE

## 2019-12-31 PROCEDURE — 99490 CHRNC CARE MGMT STAFF 1ST 20: CPT | Mod: S$PBB,,, | Performed by: FAMILY MEDICINE

## 2019-12-31 PROCEDURE — 99490 CHRNC CARE MGMT STAFF 1ST 20: CPT | Mod: PBBFAC,PN | Performed by: FAMILY MEDICINE

## 2019-12-31 PROCEDURE — 99490 PR CHRONIC CARE MGMT, 1ST 20 MIN: ICD-10-PCS | Mod: S$PBB,,, | Performed by: FAMILY MEDICINE

## 2020-01-07 RX ORDER — FERROUS SULFATE 325(65) MG
TABLET ORAL
Qty: 90 TABLET | Refills: 3 | Status: SHIPPED | OUTPATIENT
Start: 2020-01-07 | End: 2021-03-03

## 2020-01-31 ENCOUNTER — EXTERNAL CHRONIC CARE MANAGEMENT (OUTPATIENT)
Dept: PRIMARY CARE CLINIC | Facility: CLINIC | Age: 85
End: 2020-01-31
Payer: MEDICARE

## 2020-01-31 PROCEDURE — 99490 CHRNC CARE MGMT STAFF 1ST 20: CPT | Mod: PBBFAC,PN | Performed by: FAMILY MEDICINE

## 2020-01-31 PROCEDURE — 99490 PR CHRONIC CARE MGMT, 1ST 20 MIN: ICD-10-PCS | Mod: S$PBB,,, | Performed by: FAMILY MEDICINE

## 2020-01-31 PROCEDURE — 99490 CHRNC CARE MGMT STAFF 1ST 20: CPT | Mod: S$PBB,,, | Performed by: FAMILY MEDICINE

## 2020-02-29 ENCOUNTER — EXTERNAL CHRONIC CARE MANAGEMENT (OUTPATIENT)
Dept: PRIMARY CARE CLINIC | Facility: CLINIC | Age: 85
End: 2020-02-29
Payer: MEDICARE

## 2020-02-29 PROCEDURE — 99490 PR CHRONIC CARE MGMT, 1ST 20 MIN: ICD-10-PCS | Mod: S$PBB,,, | Performed by: FAMILY MEDICINE

## 2020-02-29 PROCEDURE — 99490 CHRNC CARE MGMT STAFF 1ST 20: CPT | Mod: S$PBB,,, | Performed by: FAMILY MEDICINE

## 2020-02-29 PROCEDURE — 99490 CHRNC CARE MGMT STAFF 1ST 20: CPT | Mod: PBBFAC,PN | Performed by: FAMILY MEDICINE

## 2020-03-25 RX ORDER — PRAVASTATIN SODIUM 20 MG/1
TABLET ORAL
Qty: 90 TABLET | Refills: 3 | Status: SHIPPED | OUTPATIENT
Start: 2020-03-25 | End: 2021-04-21

## 2020-03-31 ENCOUNTER — EXTERNAL CHRONIC CARE MANAGEMENT (OUTPATIENT)
Dept: PRIMARY CARE CLINIC | Facility: CLINIC | Age: 85
End: 2020-03-31
Payer: MEDICARE

## 2020-03-31 PROCEDURE — 99490 CHRNC CARE MGMT STAFF 1ST 20: CPT | Mod: S$PBB,,, | Performed by: FAMILY MEDICINE

## 2020-03-31 PROCEDURE — 99490 PR CHRONIC CARE MGMT, 1ST 20 MIN: ICD-10-PCS | Mod: S$PBB,,, | Performed by: FAMILY MEDICINE

## 2020-03-31 PROCEDURE — 99490 CHRNC CARE MGMT STAFF 1ST 20: CPT | Mod: PBBFAC,PN | Performed by: FAMILY MEDICINE

## 2020-04-30 ENCOUNTER — EXTERNAL CHRONIC CARE MANAGEMENT (OUTPATIENT)
Dept: PRIMARY CARE CLINIC | Facility: CLINIC | Age: 85
End: 2020-04-30
Payer: MEDICARE

## 2020-04-30 PROCEDURE — 99490 PR CHRONIC CARE MGMT, 1ST 20 MIN: ICD-10-PCS | Mod: S$PBB,,, | Performed by: FAMILY MEDICINE

## 2020-04-30 PROCEDURE — 99490 CHRNC CARE MGMT STAFF 1ST 20: CPT | Mod: S$PBB,,, | Performed by: FAMILY MEDICINE

## 2020-04-30 PROCEDURE — 99490 CHRNC CARE MGMT STAFF 1ST 20: CPT | Mod: PBBFAC,PN | Performed by: FAMILY MEDICINE

## 2020-05-06 ENCOUNTER — PATIENT MESSAGE (OUTPATIENT)
Dept: ADMINISTRATIVE | Facility: HOSPITAL | Age: 85
End: 2020-05-06

## 2020-05-21 ENCOUNTER — TELEPHONE (OUTPATIENT)
Dept: HEMATOLOGY/ONCOLOGY | Facility: CLINIC | Age: 85
End: 2020-05-21

## 2020-05-21 ENCOUNTER — LAB VISIT (OUTPATIENT)
Dept: LAB | Facility: HOSPITAL | Age: 85
End: 2020-05-21
Attending: INTERNAL MEDICINE
Payer: MEDICARE

## 2020-05-21 ENCOUNTER — OFFICE VISIT (OUTPATIENT)
Dept: HEMATOLOGY/ONCOLOGY | Facility: CLINIC | Age: 85
End: 2020-05-21
Payer: MEDICARE

## 2020-05-21 DIAGNOSIS — N18.30 ANEMIA IN STAGE 3 CHRONIC KIDNEY DISEASE: ICD-10-CM

## 2020-05-21 DIAGNOSIS — D51.0 VITAMIN B12 DEFICIENCY ANEMIA DUE TO INTRINSIC FACTOR DEFICIENCY: Primary | ICD-10-CM

## 2020-05-21 DIAGNOSIS — N18.2 ANEMIA IN STAGE 2 CHRONIC KIDNEY DISEASE: ICD-10-CM

## 2020-05-21 DIAGNOSIS — I10 ESSENTIAL HYPERTENSION: ICD-10-CM

## 2020-05-21 DIAGNOSIS — D50.0 IRON DEFICIENCY ANEMIA DUE TO CHRONIC BLOOD LOSS: ICD-10-CM

## 2020-05-21 DIAGNOSIS — D63.1 ANEMIA IN STAGE 3 CHRONIC KIDNEY DISEASE: ICD-10-CM

## 2020-05-21 DIAGNOSIS — D51.0 VITAMIN B12 DEFICIENCY ANEMIA DUE TO INTRINSIC FACTOR DEFICIENCY: ICD-10-CM

## 2020-05-21 DIAGNOSIS — D63.1 ANEMIA IN STAGE 2 CHRONIC KIDNEY DISEASE: ICD-10-CM

## 2020-05-21 LAB
ALBUMIN SERPL BCP-MCNC: 4.3 G/DL (ref 3.5–5.2)
ALP SERPL-CCNC: 77 U/L (ref 38–145)
ALT SERPL W/O P-5'-P-CCNC: 5 U/L (ref 0–35)
ANION GAP SERPL CALC-SCNC: 7 MMOL/L (ref 8–16)
AST SERPL-CCNC: 19 U/L (ref 14–36)
BASOPHILS # BLD AUTO: 0.02 K/UL (ref 0–0.2)
BASOPHILS NFR BLD: 0.3 % (ref 0–1.9)
BILIRUB SERPL-MCNC: 0.3 MG/DL (ref 0.2–1.3)
BUN SERPL-MCNC: 34 MG/DL (ref 7–18)
CALCIUM SERPL-MCNC: 9.6 MG/DL (ref 8.4–10.2)
CHLORIDE SERPL-SCNC: 104 MMOL/L (ref 95–110)
CO2 SERPL-SCNC: 26 MMOL/L (ref 22–31)
CREAT SERPL-MCNC: 1.55 MG/DL (ref 0.5–1.4)
DIFFERENTIAL METHOD: ABNORMAL
EOSINOPHIL # BLD AUTO: 0.4 K/UL (ref 0–0.5)
EOSINOPHIL NFR BLD: 5.3 % (ref 0–8)
ERYTHROCYTE [DISTWIDTH] IN BLOOD BY AUTOMATED COUNT: 12.6 % (ref 11.5–14.5)
EST. GFR  (AFRICAN AMERICAN): 36 ML/MIN/1.73 M^2
EST. GFR  (NON AFRICAN AMERICAN): 31 ML/MIN/1.73 M^2
FERRITIN SERPL-MCNC: 203 NG/ML (ref 12–264)
GLUCOSE SERPL-MCNC: 99 MG/DL (ref 70–110)
HCT VFR BLD AUTO: 33.2 % (ref 37–48.5)
HGB BLD-MCNC: 10.9 G/DL (ref 12–16)
IMM GRANULOCYTES # BLD AUTO: 0.02 K/UL (ref 0–0.04)
IMM GRANULOCYTES NFR BLD AUTO: 0.3 % (ref 0–0.5)
IRON SATN MFR SERPL: 28 % (ref 20–50)
IRON SERPL-MCNC: 73 UG/DL (ref 30–160)
LYMPHOCYTES # BLD AUTO: 2 K/UL (ref 1–4.8)
LYMPHOCYTES NFR BLD: 30.4 % (ref 18–48)
MCH RBC QN AUTO: 31.1 PG (ref 27–31)
MCHC RBC AUTO-ENTMCNC: 32.8 G/DL (ref 32–36)
MCV RBC AUTO: 95 FL (ref 82–98)
MONOCYTES # BLD AUTO: 0.5 K/UL (ref 0.3–1)
MONOCYTES NFR BLD: 7.1 % (ref 4–15)
NEUTROPHILS # BLD AUTO: 3.8 K/UL (ref 1.8–7.7)
NEUTROPHILS NFR BLD: 56.6 % (ref 38–73)
NRBC BLD-RTO: 0 /100 WBC
PLATELET # BLD AUTO: 295 K/UL (ref 150–350)
PMV BLD AUTO: 9.7 FL (ref 9.2–12.9)
POTASSIUM SERPL-SCNC: 4.4 MMOL/L (ref 3.5–5.1)
PROT SERPL-MCNC: 7.5 G/DL (ref 6–8.4)
RBC # BLD AUTO: 3.51 M/UL (ref 4–5.4)
SODIUM SERPL-SCNC: 137 MMOL/L (ref 136–145)
TOTAL IRON BINDING CAPACITY: 264 UG/DL (ref 265–497)
VIT B12 SERPL-MCNC: 550 PG/ML (ref 210–950)
WBC # BLD AUTO: 6.64 K/UL (ref 3.9–12.7)

## 2020-05-21 PROCEDURE — 99999 PR PBB SHADOW E&M-EST. PATIENT-LVL II: ICD-10-PCS | Mod: PBBFAC,,, | Performed by: INTERNAL MEDICINE

## 2020-05-21 PROCEDURE — 82607 VITAMIN B-12: CPT | Mod: PN

## 2020-05-21 PROCEDURE — 82607 VITAMIN B-12: CPT

## 2020-05-21 PROCEDURE — 83540 ASSAY OF IRON: CPT | Mod: PN

## 2020-05-21 PROCEDURE — 80053 COMPREHEN METABOLIC PANEL: CPT | Mod: PN

## 2020-05-21 PROCEDURE — 99212 OFFICE O/P EST SF 10 MIN: CPT | Mod: PBBFAC,PN | Performed by: INTERNAL MEDICINE

## 2020-05-21 PROCEDURE — 82728 ASSAY OF FERRITIN: CPT

## 2020-05-21 PROCEDURE — 80053 COMPREHEN METABOLIC PANEL: CPT

## 2020-05-21 PROCEDURE — 85025 COMPLETE CBC W/AUTO DIFF WBC: CPT | Mod: PN

## 2020-05-21 PROCEDURE — 82728 ASSAY OF FERRITIN: CPT | Mod: PN

## 2020-05-21 PROCEDURE — 99214 PR OFFICE/OUTPT VISIT, EST, LEVL IV, 30-39 MIN: ICD-10-PCS | Mod: S$PBB,,, | Performed by: INTERNAL MEDICINE

## 2020-05-21 PROCEDURE — 85025 COMPLETE CBC W/AUTO DIFF WBC: CPT

## 2020-05-21 PROCEDURE — 83540 ASSAY OF IRON: CPT

## 2020-05-21 PROCEDURE — 36415 COLL VENOUS BLD VENIPUNCTURE: CPT | Mod: PN

## 2020-05-21 PROCEDURE — 99214 OFFICE O/P EST MOD 30 MIN: CPT | Mod: S$PBB,,, | Performed by: INTERNAL MEDICINE

## 2020-05-21 PROCEDURE — 99999 PR PBB SHADOW E&M-EST. PATIENT-LVL II: CPT | Mod: PBBFAC,,, | Performed by: INTERNAL MEDICINE

## 2020-05-21 NOTE — TELEPHONE ENCOUNTER
----- Message from Heidi Palm sent at 5/21/2020  9:15 AM CDT -----  Purvi Matos, is calling requesting to reschedule the appts that are on today.  Please call her at [phone].  Thank you!

## 2020-05-21 NOTE — TELEPHONE ENCOUNTER
Spoke w pt's daughter.  She wanted to give alternate number incase the call didn't go through on the number listed.

## 2020-05-21 NOTE — TELEPHONE ENCOUNTER
----- Message from Johnny Thomas sent at 5/21/2020 12:30 PM CDT -----  Type:  Patient Returning Call    Who Called:  Daughter- [place]   Who Left Message for Patient:   Does the patient know what this is regarding?:   Best Call Back Number:  [phone]  Additional Information:

## 2020-05-21 NOTE — TELEPHONE ENCOUNTER
S/w pt's daughter [place] re: she needs to reschedule her mom's apts with Dr. Steven today.  Rescheduled per pt's preference

## 2020-05-25 ENCOUNTER — CLINICAL SUPPORT (OUTPATIENT)
Dept: AUDIOLOGY | Facility: CLINIC | Age: 85
End: 2020-05-25
Payer: MEDICARE

## 2020-05-25 ENCOUNTER — OFFICE VISIT (OUTPATIENT)
Dept: OTOLARYNGOLOGY | Facility: CLINIC | Age: 85
End: 2020-05-25
Payer: MEDICARE

## 2020-05-25 ENCOUNTER — PATIENT OUTREACH (OUTPATIENT)
Dept: ADMINISTRATIVE | Facility: OTHER | Age: 85
End: 2020-05-25

## 2020-05-25 VITALS — WEIGHT: 113.13 LBS | HEIGHT: 61 IN | BODY MASS INDEX: 21.36 KG/M2 | TEMPERATURE: 99 F

## 2020-05-25 DIAGNOSIS — H92.09 OTALGIA, UNSPECIFIED LATERALITY: Primary | ICD-10-CM

## 2020-05-25 DIAGNOSIS — H92.02 REFERRED OTALGIA OF LEFT EAR: Primary | ICD-10-CM

## 2020-05-25 DIAGNOSIS — K12.1 DENTURE STOMATITIS: ICD-10-CM

## 2020-05-25 DIAGNOSIS — H92.02 REFERRED OTALGIA OF LEFT EAR: ICD-10-CM

## 2020-05-25 DIAGNOSIS — H69.91 UNSPECIFIED EUSTACHIAN TUBE DISORDER, RIGHT EAR: ICD-10-CM

## 2020-05-25 PROCEDURE — 99213 OFFICE O/P EST LOW 20 MIN: CPT | Mod: PBBFAC,PO | Performed by: NURSE PRACTITIONER

## 2020-05-25 PROCEDURE — 99999 PR PBB SHADOW E&M-EST. PATIENT-LVL III: ICD-10-PCS | Mod: PBBFAC,,, | Performed by: NURSE PRACTITIONER

## 2020-05-25 PROCEDURE — 92567 TYMPANOMETRY: CPT | Mod: PBBFAC,PO | Performed by: AUDIOLOGIST

## 2020-05-25 PROCEDURE — 99214 PR OFFICE/OUTPT VISIT, EST, LEVL IV, 30-39 MIN: ICD-10-PCS | Mod: S$PBB,,, | Performed by: NURSE PRACTITIONER

## 2020-05-25 PROCEDURE — 99214 OFFICE O/P EST MOD 30 MIN: CPT | Mod: S$PBB,,, | Performed by: NURSE PRACTITIONER

## 2020-05-25 PROCEDURE — 99999 PR PBB SHADOW E&M-EST. PATIENT-LVL III: CPT | Mod: PBBFAC,,, | Performed by: NURSE PRACTITIONER

## 2020-05-25 NOTE — PROGRESS NOTES
"Subjective:       Patient ID: Sobeida Mcclain is a 84 y.o. female.    Chief Complaint: Otalgia    HPI   Patient reports left otalgia, intermittent, X 1.5-2 weeks, mostly with eating/chewing. No otorrhea or otorrhagia. No head cold symptoms. Some coughing. No illness or fever.     Review of Systems   Constitutional: Negative.    HENT: Positive for ear pain (left, with chewing/eating).    Eyes: Negative.    Respiratory: Negative.    Cardiovascular: Negative.    Gastrointestinal: Negative.    Musculoskeletal: Negative.    Skin: Negative.    Neurological: Negative.    Hematological: Negative.    Psychiatric/Behavioral: Negative.        Objective:      Physical Exam   Constitutional: She is oriented to person, place, and time. Vital signs are normal. She appears well-developed and well-nourished. She is cooperative. She does not appear ill. No distress.   HENT:   Head: Normocephalic and atraumatic.   Right Ear: Hearing, tympanic membrane, external ear and ear canal normal. Tympanic membrane is not erythematous. Tympanic membrane mobility is normal. No middle ear effusion.   Left Ear: Hearing, tympanic membrane, external ear and ear canal normal. Tympanic membrane is not erythematous. Tympanic membrane mobility is normal.  No middle ear effusion.   Nose: Nose normal.   Mouth/Throat: Uvula is midline, oropharynx is clear and moist and mucous membranes are normal. She has dentures (had pt remove her dentures for oral exam).       Type "A" tympanograms consistent with well-pneumatized mesotympanums and absence of middle ear effusions AU     Eyes: EOM and lids are normal. Right eye exhibits no discharge. Left eye exhibits no discharge. No scleral icterus.   Neck: Trachea normal and normal range of motion. Neck supple. No tracheal deviation present.   Cardiovascular: Normal rate.   Pulmonary/Chest: Effort normal. No stridor. No respiratory distress. She has no wheezes.   Musculoskeletal: Normal range of motion.   Neurological: " "She is alert and oriented to person, place, and time. She has normal strength. Coordination and gait normal.   Skin: Skin is warm, dry and intact. She is not diaphoretic. No cyanosis. No pallor.   Psychiatric: She has a normal mood and affect. Her speech is normal and behavior is normal. Judgment and thought content normal. Cognition and memory are normal.   Nursing note and vitals reviewed.      Assessment:     Otalgia AS, referred, not otogenic, most likely musculoskeletal etiology      Plan:     Discussed Type "A" tympanograms, which is consistent with well-aerated middle ear spaces and absence of middle ear fluid.   Reassured no middle ear fluid/infection at this time.   Discussed other possible causes for continued ear pain can include but are not limited to:   · dental pathology or TMJ (jaw joint arthritis) -- see your dentist  · cervical spine arthritis (discuss possible imaging/MRI with PCP)  · heck/neck neoplasms (CT neck w/contrast)  · myofascial pain syndrome/headaches or neuralgias (see your neurologist)  · Shingles (would break out in a painful, red, blistering rash on one side)  · GERD (anti-acid reflux medications twice daily and see your GI)      TMJ handouts given and discussed  "

## 2020-05-25 NOTE — PROGRESS NOTES
Tympanometry completed per order from Diana Bess NP.    Type A tympanogram obtained AU.    Patient referred back to Diana Bess NP for follow-up evaluation.

## 2020-05-25 NOTE — PROGRESS NOTES
The patient location is:home wit daughter  Visit type:  audio   Total time spent with patient: 25 mnutes  Each patient I provide medical services by telemedicine is:  (1) informed of the relationship between the physician and patient and the respective role of any other health care provider with respect to management of the patient; and (2) notified that he or she may decline to receive medical services by telemedicine and may withdraw from such care at any time.      HISTORY OF PRESENT ILLNESS:  This is a 84-year-old  woman folowing with me for anmeia.  Phone visit during COVID poandemic.  The patient has anemia of chronic disease.  She has had   postmenopausal bleeding.  Endometrial biopsies were negative for malignancy.    She is no longer having any bleeding.  She continues to see Dr. Recinos for her CAD.      REVIEW OF SYSTEMS:     CONSTITUTIONAL: The patient denies any weight change. There is no apparent    change in appetite, fever, night sweats, headaches, fatigue, dizziness, or    weakness.      SKIN: Denies rash, issues with nails, non-healing sores, bleeding, blotching    skin or abnormal bruising. Denies new moles or changes to existing moles.      BREASTS: There is no swelling around breasts or nipple discharge.    EYES: Denies eye pain, blurred vision, swelling, redness or discharge.      ENT AND MOUTH: Denies runny nose, stuffiness, sinus trouble or sores. Denies    nosebleeds. Denies, hoarseness, change in voice or swelling in front of the    neck.      CARDIOVASCULAR: Denies chest pain, discomfort or palpitations. Denies neck    swelling or episodes of passing out.      RESPIRATORY: Denies cough, sputum production, blood in sputum, and denies    shortness of breath.      GI: Denies trouble swallowing, indigestion, heartburn, abdominal pain, nausea,    vomiting, diarrhea, altered bowel habits, blood in stool, discoloration of    stools, change in nature of stool, bloating, increased abdominal  girth.      GENITOURINARY: No discharge. No pelvic pain or lumps. No rash around groin or  lesions. No urinary frequency, hesitation, painful urination or blood in    urine. + incontinence.   MUSCULOSKELETAL: Denies neck or back pain. occ. weakness in arms or legs,  and fall prone  joint problems or distended inflamed veins in legs. Denies swelling or abnormal  glands.      NEUROLOGICAL: Denies tingling, numbness, altered mentation changes to nerve    function in the face, weakness to one or both of the body. Denies changes to    gait and denies multiple falls or accidents.      PSYCHIATRIC: Denies nervousness, anxiety, hallucinations, depression, suicidal    ideation, trouble sleeping or changes in behavior noticed by family.      The patient denies recent foreign travel or recent exposure to chemicals or    products of concern or infectious diseases.                PHYSICAL EXAMINATION:  Wt Readings from Last 3 Encounters:   12/04/19 51.3 kg (113 lb 1.5 oz)   09/30/19 51.3 kg (113 lb 1.5 oz)   07/29/19 51.1 kg (112 lb 10.5 oz)     Temp Readings from Last 3 Encounters:   09/30/19 97.6 °F (36.4 °C) (Oral)   07/29/19 98 °F (36.7 °C) (Oral)   02/28/19 98.4 °F (36.9 °C)     BP Readings from Last 3 Encounters:   09/30/19 (!) 122/50   07/29/19 (!) 128/58   04/01/19 (!) 108/52     Pulse Readings from Last 3 Encounters:   09/30/19 (!) 52   07/29/19 62   04/01/19 64     Patient sounds alert, oriented x3 sounds well, hard of hearing  Patient is coherent and cooperative.speech is normal  There are no audible signs of distress. No congestion or  hoarseness to voice noted No wheezes heard.no respiratory distress or dyspnea detected  No altered mental status or agitation noted  No tangential thinking, or neuro psychiatric distress noted by me during our conversation .    LABORATORY DATA:    Lab Results   Component Value Date    WBC 6.64 05/21/2020    HGB 10.9 (L) 05/21/2020    HCT 33.2 (L) 05/21/2020    MCV 95 05/21/2020    PLT  295 05/21/2020     CMP  Sodium   Date Value Ref Range Status   05/21/2020 137 136 - 145 mmol/L Final     Potassium   Date Value Ref Range Status   05/21/2020 4.4 3.5 - 5.1 mmol/L Final     Chloride   Date Value Ref Range Status   05/21/2020 104 95 - 110 mmol/L Final     CO2   Date Value Ref Range Status   05/21/2020 26 22 - 31 mmol/L Final     Glucose   Date Value Ref Range Status   05/21/2020 99 70 - 110 mg/dL Final     Comment:     The ADA recommends the following guidelines for fasting glucose:  Normal:       less than 100 mg/dL  Prediabetes:  100 mg/dL to 125 mg/dL  Diabetes:     126 mg/dL or higher       BUN, Bld   Date Value Ref Range Status   05/21/2020 34 (H) 7 - 18 mg/dL Final     Creatinine   Date Value Ref Range Status   05/21/2020 1.55 (H) 0.50 - 1.40 mg/dL Final     Calcium   Date Value Ref Range Status   05/21/2020 9.6 8.4 - 10.2 mg/dL Final     Total Protein   Date Value Ref Range Status   05/21/2020 7.5 6.0 - 8.4 g/dL Final     Albumin   Date Value Ref Range Status   05/21/2020 4.3 3.5 - 5.2 g/dL Final     Total Bilirubin   Date Value Ref Range Status   05/21/2020 0.3 0.2 - 1.3 mg/dL Final     Alkaline Phosphatase   Date Value Ref Range Status   05/21/2020 77 38 - 145 U/L Final     AST   Date Value Ref Range Status   05/21/2020 19 14 - 36 U/L Final     ALT   Date Value Ref Range Status   05/21/2020 5 0 - 35 U/L Final     Anion Gap   Date Value Ref Range Status   05/21/2020 7 (L) 8 - 16 mmol/L Final     eGFR if    Date Value Ref Range Status   05/21/2020 36 (A) >60 mL/min/1.73 m^2 Final     eGFR if non    Date Value Ref Range Status   05/21/2020 31 (A) >60 mL/min/1.73 m^2 Final     Comment:     Calculation used to obtain the estimated glomerular filtration  rate (eGFR) is the CKD-EPI equation.        Lab Results   Component Value Date    IRON 73 05/21/2020    TIBC 264 (L) 05/21/2020    FERRITIN 203 05/21/2020     Lab Results   Component Value Date    OYFXUMJZ36 550  05/21/2020     CMP  PLAN:  Continue to monitor,hgb slightly lower this time pt seems to be falling states sh has ear issues, possibly benign positional vertigo   sometimes when she stands I have offered her antiverrt in the past they will think about it. Not now per pt   sees cardiology and pcp   Rest of anemia workup has been   negative so far and only a bone marrow has not been performed yet.  We will   continue to observe only as the numbers are improving.    Mild CKD :  Cont supplement of iron and b12 recheck in 3-4 months with cbc, cmp, iron and b12

## 2020-05-25 NOTE — PATIENT INSTRUCTIONS
"The "gold standard" for determining presence or absence of middle ear fluid is tympanometry.   You have Type "A" tympanograms, which is consistent with well-aerated middle ear spaces and absence of middle ear fluid.   There is no remaining middle ear fluid/infection at this time.   Other possible causes for continued ear pain can include but are not limited to:   · dental pathology or TMJ (jaw joint arthritis) -- see your dentist  · cervical spine arthritis (discuss possible imaging/MRI with PCP)  · heck/neck neoplasms (CT neck w/contrast)  · myofascial pain syndrome/headaches or neuralgias (see your neurologist)  · Shingles (would break out in a painful, red, blistering rash on one side)  · GERD (anti-acid reflux medications twice daily and see your GI)      When You Have Temporomandibular Disorder (TMD)  The temporomandibular joint (TMJ) is a ball-and-socket joint located where the upper and lower jaws meet. The TMJ and its nearby muscles make up a complex, loosely connected system. Because of this, a problem in one part of the system can affect the other parts. This can cause you to have temporomandibular disorder (TMD).         How the temporomandibular joint works  You have 1 joint on each side of your mouth that together make up the TMJ. These joints are part of a large group of muscles, ligaments, and bones that work together as a system. When the system is healthy, you can talk, chew, and even yawn in comfort. Muscles contract and relax to open and close the joint. The disk is made of cartilage and is located between the condyle and the skull. It absorbs pressure in the joint and allows the jaws to open and close smoothly. Fluid (called synovial fluid) lubricates the joints. Ligaments connect the lower jaw bones to the skull. They also support the joint.  Common temporomandibular problems  When there is a problem with the TMJ and its related system, you can develop TMD. Common TMD problems include tight " muscles, inflamed joints, and damaged joints.  In some cases, symptoms may be related to the teeth or bite.  · Tight muscles. The muscles surrounding the TMJ can go into spasm (tighten) and cause pain. Referred pain happens in a part of the body separate from the source of the problem. For example, pain in the face or teeth could be coming from a problem in the TMJ. Myofascial pain happens in soft tissues, like muscle. Trigger points in these pain areas often cause referred pain. You may feel jaw, neck, or shoulder pain.  · Inflamed joints. Inflammation may include pain, redness, heat, swelling, or loss of function. Synovitis happens when certain tissues surrounding the TMJ become inflamed. It causes pain that increases with jaw movement. Inflamed ligamentscan be caused by strain or injury. When this happens, the ligaments are unable to support the joint. Rheumatoid arthritis is a joint disease. It leads to inflammation in the TMJ.  · Damaged joints. Many people hear clicking when their jaw moves. If you feel pain along with the noise, the joint may be damaged. Impingement happens when the disk slips out of place (displacement). This causes the jaw to catch. As the disk slips, you may hear a clicking sound. Locked jaw happens when the disk gets stuck in one position. As a result, the jaw locks open or closed. Osteoarthritis is a joint disease. It causes the TMJ to wear away (degenerate). This leads to pain during movement.     Other problems  The parts of the jaw and mouth make up a single unit. Thats why a problem in 1 area can cause symptoms elsewhere. Teeth or bite problems associated with TMD include:  · Bruxism (grinding your teeth side to side)  · Clenching (biting down on your teeth)  · Malocclusion (when the teeth or bite is out of alignment)  Your health care provider will give you more information about these problems if needed.       Pain Relief Methods for Temporomandibular Disorders (TMD)  You have  been diagnosed with temporomandibular disorder (TMD). This term describes a group of problems related to the temporomandibular joint (TMJ)and nearby muscles. The TMJ is located where the upper and lower jaws meet. TMD can cause painful and frustrating symptoms. But your health care provider can recommend various pain relief methods as part of your treatment. These may include medicines and certain types of therapy, like massage or gentle exercise.  Using medicines    Medicines may be prescribed to treat TMD. Others may be available over-the-counter. The medicine type and dosage will depend on the problem you have. For your safety, tell your health care provider if you are currently taking any medicines. Also mention any herbs or supplements you are using. Common medicines used to treat TMD include:  · Anti-inflammatories and analgesics. These treat pain, inflammation, osteoarthritis, and rheumatoid arthritis. Anti-inflammatories reduce swelling, heat, redness, and pain. They also help restore function. Analgesics reduce pain. Nonsteroidal anti-inflammatories (NSAIDs) relieve inflammation as well as pain.  · Muscle relaxants. These treat myofascial pain. This is pain that occurs in the soft tissues or muscles around the TMJ. Muscle relaxants help ease muscle tension. This reduces pressure on the TMJ from tight jaw muscles.  · Antidepressants. These can be used to reduce pain or bruxism (teeth grinding). At higher dosages, these medicines are used to treat depression. Given at low dosages, antidepressants help relieve TMD symptoms. They can reduce muscle pain. They also raise the level of serotonin, a body chemical that improves sleep. This in turn can decrease bruxism during the night.  Treating painful muscles  A trigger point is a painful spot in a tight muscle. It is often painful to the touch and may refer pain to other places. Your health care provider can focus on trigger points using:  · Massage, both inside  and outside the mouth. This relaxes muscles and improves circulation.  · Palpation, which is applying pressure to points of the jaw and face with the fingers.  · Cold spray and stretching of the muscles to relax them.  · An anesthetic for pain relief. This may be given as an injection by your dentist.  Treating the joint  Therapy may focus directly on the TMJ. There are different ways to treat the joint:  · A self-care program helps you treat and manage symptoms on your own. Your program may include exercises. It may also include using ice and heat to relieve pain.  · Gentle manipulation reduces pain and restores range of motion. The health care provider uses his or her hands to relax muscles and ligaments around the joint.  · Exercises strengthen muscles in the jaw and face.  · Ultrasound uses sound waves to reduce pain and swelling. It also improves pain and swelling.  Treating inflammation  When the joint is inflamed, movement becomes difficult -- even impossible at times. Your health care provider can help. Treatment may include:  · Rest and gentle exercise to increase range of motion. One common exercise is to apply pressure to the jaw and resist the movement (isometric exercise).  · A gel pack or ice wrapped in a towel applied for 10 to 20 minutes repeated 3 or 4 times a day. This eases swelling and reduces pain.  · Massage and gentle manipulation as described above.  Date Last Reviewed: 7/13/2015  © 5567-7116 [employer]. [address] [place] [address], [place] [address]. All rights reserved. This information is not intended as a substitute for professional medical care. Always follow your healthcare professional's instructions.

## 2020-05-26 DIAGNOSIS — H69.91 UNSPECIFIED EUSTACHIAN TUBE DISORDER, RIGHT EAR: ICD-10-CM

## 2020-05-26 DIAGNOSIS — H92.02 REFERRED OTALGIA OF LEFT EAR: Primary | ICD-10-CM

## 2020-05-27 LAB — STFR SERPL-MCNC: 2.2 MG/L (ref 1.8–4.6)

## 2020-05-31 ENCOUNTER — EXTERNAL CHRONIC CARE MANAGEMENT (OUTPATIENT)
Dept: PRIMARY CARE CLINIC | Facility: CLINIC | Age: 85
End: 2020-05-31
Payer: MEDICARE

## 2020-05-31 PROCEDURE — 99490 PR CHRONIC CARE MGMT, 1ST 20 MIN: ICD-10-PCS | Mod: S$PBB,,, | Performed by: FAMILY MEDICINE

## 2020-05-31 PROCEDURE — 99490 CHRNC CARE MGMT STAFF 1ST 20: CPT | Mod: PBBFAC,PN | Performed by: FAMILY MEDICINE

## 2020-05-31 PROCEDURE — 99490 CHRNC CARE MGMT STAFF 1ST 20: CPT | Mod: S$PBB,,, | Performed by: FAMILY MEDICINE

## 2020-06-30 ENCOUNTER — EXTERNAL CHRONIC CARE MANAGEMENT (OUTPATIENT)
Dept: PRIMARY CARE CLINIC | Facility: CLINIC | Age: 85
End: 2020-06-30
Payer: MEDICARE

## 2020-06-30 PROCEDURE — 99490 CHRNC CARE MGMT STAFF 1ST 20: CPT | Mod: PBBFAC,PN | Performed by: FAMILY MEDICINE

## 2020-06-30 PROCEDURE — 99490 CHRNC CARE MGMT STAFF 1ST 20: CPT | Mod: S$PBB,,, | Performed by: FAMILY MEDICINE

## 2020-06-30 PROCEDURE — 99490 PR CHRONIC CARE MGMT, 1ST 20 MIN: ICD-10-PCS | Mod: S$PBB,,, | Performed by: FAMILY MEDICINE

## 2020-07-22 ENCOUNTER — TELEPHONE (OUTPATIENT)
Dept: FAMILY MEDICINE | Facility: CLINIC | Age: 85
End: 2020-07-22

## 2020-07-22 ENCOUNTER — HOSPITAL ENCOUNTER (OUTPATIENT)
Dept: RADIOLOGY | Facility: HOSPITAL | Age: 85
Discharge: HOME | End: 2020-07-22
Attending: NURSE PRACTITIONER
Payer: MEDICARE

## 2020-07-22 ENCOUNTER — OFFICE VISIT (OUTPATIENT)
Dept: FAMILY MEDICINE | Facility: CLINIC | Age: 85
End: 2020-07-22
Payer: MEDICARE

## 2020-07-22 ENCOUNTER — DOCUMENTATION ONLY (OUTPATIENT)
Dept: SURGERY | Facility: HOSPITAL | Age: 85
End: 2020-07-22

## 2020-07-22 VITALS
OXYGEN SATURATION: 96 % | BODY MASS INDEX: 21.36 KG/M2 | HEIGHT: 61 IN | HEART RATE: 52 BPM | SYSTOLIC BLOOD PRESSURE: 90 MMHG | TEMPERATURE: 98 F | DIASTOLIC BLOOD PRESSURE: 60 MMHG | WEIGHT: 113.13 LBS
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
## 2020-07-22 DIAGNOSIS — S70.11XA HEMATOMA OF RIGHT THIGH, INITIAL ENCOUNTER: ICD-10-CM

## 2020-07-22 DIAGNOSIS — I95.9 HYPOTENSION, UNSPECIFIED HYPOTENSION TYPE: Primary | ICD-10-CM

## 2020-07-22 DIAGNOSIS — N18.30 CKD (CHRONIC KIDNEY DISEASE), STAGE III: Primary | ICD-10-CM

## 2020-07-22 DIAGNOSIS — N18.30 CKD (CHRONIC KIDNEY DISEASE), STAGE III: ICD-10-CM

## 2020-07-22 DIAGNOSIS — Z09 HOSPITAL DISCHARGE FOLLOW-UP: ICD-10-CM

## 2020-07-22 PROCEDURE — 73030 X-RAY EXAM OF SHOULDER: CPT | Mod: 26,LT,, | Performed by: RADIOLOGY

## 2020-07-22 PROCEDURE — 73560 XR KNEE ORTHO RIGHT: ICD-10-PCS | Mod: 26,59,LT, | Performed by: RADIOLOGY

## 2020-07-22 PROCEDURE — 99214 OFFICE O/P EST MOD 30 MIN: CPT | Mod: S$PBB,,, | Performed by: NURSE PRACTITIONER

## 2020-07-22 PROCEDURE — 99214 OFFICE O/P EST MOD 30 MIN: CPT | Mod: PBBFAC,25,PO | Performed by: NURSE PRACTITIONER

## 2020-07-22 PROCEDURE — 99999 PR PBB SHADOW E&M-EST. PATIENT-LVL IV: ICD-10-PCS | Mod: PBBFAC,,, | Performed by: NURSE PRACTITIONER

## 2020-07-22 PROCEDURE — 73030 X-RAY EXAM OF SHOULDER: CPT | Mod: TC,FY,PO,LT

## 2020-07-22 PROCEDURE — 73562 XR KNEE ORTHO RIGHT: ICD-10-PCS | Mod: 26,RT,, | Performed by: RADIOLOGY

## 2020-07-22 PROCEDURE — 99999 PR PBB SHADOW E&M-EST. PATIENT-LVL IV: CPT | Mod: PBBFAC,,, | Performed by: NURSE PRACTITIONER

## 2020-07-22 PROCEDURE — 73560 X-RAY EXAM OF KNEE 1 OR 2: CPT | Mod: 26,59,LT, | Performed by: RADIOLOGY

## 2020-07-22 PROCEDURE — 73562 X-RAY EXAM OF KNEE 3: CPT | Mod: TC,FY,PO,RT

## 2020-07-22 PROCEDURE — 99214 PR OFFICE/OUTPT VISIT, EST, LEVL IV, 30-39 MIN: ICD-10-PCS | Mod: S$PBB,,, | Performed by: NURSE PRACTITIONER

## 2020-07-22 PROCEDURE — 73562 X-RAY EXAM OF KNEE 3: CPT | Mod: 26,RT,, | Performed by: RADIOLOGY

## 2020-07-22 PROCEDURE — 73030 XR SHOULDER COMPLETE 2 OR MORE VIEWS LEFT: ICD-10-PCS | Mod: 26,LT,, | Performed by: RADIOLOGY

## 2020-07-22 PROCEDURE — 73560 X-RAY EXAM OF KNEE 1 OR 2: CPT | Mod: TC,FY,PO,LT

## 2020-07-22 RX ORDER — NAPROXEN SODIUM 220 MG/1
325 TABLET, FILM COATED ORAL
COMMUNITY
End: 2020-12-21 | Stop reason: CLARIF

## 2020-07-22 RX ORDER — PANTOPRAZOLE SODIUM 40 MG/1
TABLET, DELAYED RELEASE ORAL
COMMUNITY
Start: 2020-07-14 | End: 2020-12-21

## 2020-07-22 RX ORDER — SUCRALFATE 1 G/1
TABLET ORAL
COMMUNITY
Start: 2020-07-14 | End: 2020-12-21

## 2020-07-22 RX ORDER — NITROFURANTOIN (MACROCRYSTALS) 100 MG/1
CAPSULE ORAL
COMMUNITY
Start: 2020-07-14 | End: 2020-12-21 | Stop reason: ALTCHOICE

## 2020-07-22 NOTE — TELEPHONE ENCOUNTER
----- Message from Daniella Roberts sent at 7/22/2020  1:24 PM CDT -----  Contact: pt's daughter Virginia  Patient's daughter Virginia called back she just missed a call from the office   She needs clarity on the medication you wanted her mother to stop taking.    Call back to Virginia [phone]

## 2020-07-22 NOTE — SIGNIFICANT EVENT
Response Team - Patient Flow Sheet     Date: 2020  Time: 1020  Location: Parking Lot  Name: Sobeida Mcclain  : 1935  MRN: [id number]  PCP: Mal Brenstein MD  Allergies:   Review of patient's allergies indicates:   Allergen Reactions    Celestone [betamethasone sodium phosphate] Hives    Codeine      Change in mental status    Penicillins Hives     Past Medical History:    Past Medical History:   Diagnosis Date    Anticoagulant long-term use     Carotid artery disease     Coronary artery disease     chest discomfort, does not take NTG    Hypertension        Reason for response team call: Fall    Injury:  Yes    Vitals:  Time BP HR Resp SPO2 Glucose   1020 90/42  18                                                 Time Notes   1020 Daughter at patient's side. States mother fell over the parking stop then fell back onto it. Right knee abrasion-slight swelling no obvious bleeding. Ice pack applied. Left elbow abrasion. Pt c/o back pain but no obvious injury. Assisted into WC and into clinic for appt with Freddie MCKINNON                     Response Team Members: Mich ALMANZA, Hay, [facility] security  Provider Response Called:  NA   Provider Name/Time: NA    **  Family/Friend/Caregiver Notified: Daughter with pt

## 2020-07-22 NOTE — PROGRESS NOTES
Subjective:       Patient ID: Sobeida Mcclain is a 85 y.o. female.    Chief Complaint: Hospital Follow Up and Fall (pt fell in parking lot and blacked out. triage called )    HPI   Admitted to [facility] on 7/13/20 for chest pain and UTI. Diagnosed with GERD and UTI. DCd home on 7/14/20.   Completed Macrodantin. Denies dysuria, urinary frequency, hematuria or flank pain. Afebrile. GERD is controlled on pantoprazole    Patient tripped in the parking lot outside when coming for appointment. She denies LOC or head trauma--mis-stepped over curb. She hit right knee and thigh and left shoulder.  She is on Xarelto      HTN: followed by Dr. Recinos. hypotensive today. Poor water intake--only drinks coffee and tea. Denies dizziness or lightheadedness     Vitals:    07/22/20 1044   BP: 90/60   Pulse:    Temp:      Review of Systems   Constitutional: Negative for diaphoresis and fever.   HENT: Negative for facial swelling and trouble swallowing.    Eyes: Negative for discharge and redness.   Respiratory: Negative for cough and shortness of breath.    Cardiovascular: Negative for chest pain and palpitations.   Gastrointestinal: Negative for abdominal pain.   Genitourinary: Negative for difficulty urinating, dysuria and frequency.   Musculoskeletal: Positive for arthralgias. Negative for gait problem.   Skin: Positive for wound. Negative for pallor and rash.   Neurological: Negative for dizziness, facial asymmetry, speech difficulty and light-headedness.   Psychiatric/Behavioral: Negative for confusion. The patient is not nervous/anxious.        Past Medical History:   Diagnosis Date    Anticoagulant long-term use     Carotid artery disease     Coronary artery disease     chest discomfort, does not take NTG    Hypertension      Objective:      Physical Exam  Vitals signs and nursing note reviewed.   Constitutional:       General: She is not in acute distress.     Appearance: She is not diaphoretic.   HENT:      Head: Normocephalic.       Right Ear: Hearing normal.      Left Ear: Hearing normal.      Nose: Nose normal.   Eyes:      General: Lids are normal.      Conjunctiva/sclera: Conjunctivae normal.   Neck:      Vascular: No JVD.      Trachea: No tracheal deviation.   Cardiovascular:      Rate and Rhythm: Normal rate.      Heart sounds: Normal heart sounds.   Pulmonary:      Effort: Pulmonary effort is normal.      Breath sounds: Normal breath sounds.   Abdominal:      General: There is no distension.      Palpations: Abdomen is soft.      Tenderness: There is no abdominal tenderness.   Musculoskeletal:         General: No deformity.      Left shoulder: She exhibits decreased range of motion and pain.      Right hip: Normal. She exhibits no tenderness.      Left hip: Normal. She exhibits no tenderness.      Right knee: Tenderness found.        Legs:    Skin:     Coloration: Skin is not pale.      Comments: Abrasion to right knee and left forearm--mild without complication   Neurological:      Mental Status: She is alert and oriented to person, place, and time.   Psychiatric:         Speech: Speech normal.         Behavior: Behavior normal.         Thought Content: Thought content normal.         Judgment: Judgment normal.         Assessment:       1. Hypotension, unspecified hypotension type    2. CKD (chronic kidney disease), stage III    3. Fall, initial encounter    4. Hospital discharge follow-up    5. Hematoma of right thigh, initial encounter        Plan:       Hypotension, unspecified hypotension type  -     CBC auto differential; Future; Expected date: 07/22/2020  -     Comprehensive metabolic panel; Future; Expected date: 07/22/2020    CKD (chronic kidney disease), stage III  -     Comprehensive metabolic panel; Future; Expected date: 07/22/2020    Fall, initial encounter  -     X-ray Knee Ortho Right; Future; Expected date: 07/22/2020  -     X-Ray Shoulder 2 or More Views Left; Future; Expected date: 07/22/2020    Hospital discharge  follow-up   Awaiting receipt of inpatient records    Long term current use anticoagulation   Hematoma of right thigh, initial encounter   Ice, compression, return/bleeding precautions given     Abrasions   otc antibiotic ointment BID     Labs and xray today   DC triamterene-hctz. Monitor home BP. ER/return precautions given   Increase water intake   MARCELINO Recinos in 1 week as scheduled, sooner with me as needed    Medication List with Changes/Refills   Current Medications    ASPIRIN 81 MG CHEW    325 mg.    CLOPIDOGREL (PLAVIX) 75 MG TABLET    Take 75 mg by mouth once daily.     CYANOCOBALAMIN (VITAMIN B-12) 1000 MCG TABLET    Take 1,000 mcg by mouth once daily.    FERROUS SULFATE (FEOSOL) 325 MG (65 MG IRON) TAB TABLET    TAKE 1 TABLET (325 MG TOTAL) BY MOUTH ONCE DAILY.    METOPROLOL TARTRATE (LOPRESSOR) 25 MG TABLET    TAKE 1 TABLET BY MOUTH TWICE A DAY    NITROFURANTOIN (MACRODANTIN) 100 MG CAPSULE        PANTOPRAZOLE (PROTONIX) 40 MG TABLET        PRAVASTATIN (PRAVACHOL) 20 MG TABLET    TAKE 1 TABLET BY MOUTH EVERY DAY    SERTRALINE (ZOLOFT) 100 MG TABLET    Take 1 tablet (100 mg total) by mouth once daily.    SUCRALFATE (CARAFATE) 1 GRAM TABLET        XARELTO 20 MG TAB    TAKE 1 TABLET (20 MG TOTAL) BY MOUTH DAILY WITH DINNER OR EVENING MEAL.   Discontinued Medications    TRIAMTERENE-HYDROCHLOROTHIAZIDE 37.5-25 MG (DYAZIDE) 37.5-25 MG PER CAPSULE    Take 1 capsule by mouth every morning.

## 2020-07-22 NOTE — TELEPHONE ENCOUNTER
lvm for pt daughter virginia to give office a call regarding medication that blaine discontinued for her and also give results of xrys

## 2020-07-23 ENCOUNTER — TELEPHONE (OUTPATIENT)
Dept: FAMILY MEDICINE | Facility: CLINIC | Age: 85
End: 2020-07-23

## 2020-07-23 DIAGNOSIS — Z87.440 HX: UTI (URINARY TRACT INFECTION): Primary | ICD-10-CM

## 2020-07-23 NOTE — TELEPHONE ENCOUNTER
Please call patient's daughter [place] and schedule repeat labs and urine in 1 week       Records received from [place]: nitrofurantoin intermediate on sensitivity report

## 2020-07-23 NOTE — TELEPHONE ENCOUNTER
----- Message from Milli Stock sent at 7/22/2020  3:41 PM CDT -----  Regarding: Results  Contact: Daughter/Virginia/[phone]  Type:  Patient Returning Call    Who Called:  Daughter/[place]ia/[phone]  Who Left Message for Patient:  Zuri  Does the patient know what this is regarding?:  Results and medication

## 2020-07-30 ENCOUNTER — LAB VISIT (OUTPATIENT)
Dept: LAB | Facility: HOSPITAL | Age: 85
End: 2020-07-30
Attending: NURSE PRACTITIONER
Payer: MEDICARE

## 2020-07-30 DIAGNOSIS — Z87.440 HX: UTI (URINARY TRACT INFECTION): ICD-10-CM

## 2020-07-30 DIAGNOSIS — N39.0 URINARY TRACT INFECTION WITHOUT HEMATURIA, SITE UNSPECIFIED: ICD-10-CM

## 2020-07-30 LAB
BACTERIA #/AREA URNS HPF: NORMAL /HPF
BILIRUB UR QL STRIP: NEGATIVE
CLARITY UR: CLEAR
COLOR UR: YELLOW
GLUCOSE UR QL STRIP: NEGATIVE
HGB UR QL STRIP: ABNORMAL
KETONES UR QL STRIP: NEGATIVE
LEUKOCYTE ESTERASE UR QL STRIP: ABNORMAL
MICROSCOPIC COMMENT: NORMAL
NITRITE UR QL STRIP: NEGATIVE
PH UR STRIP: 6 [PH] (ref 5–8)
PROT UR QL STRIP: NEGATIVE
RBC #/AREA URNS HPF: 2 /HPF (ref 0–4)
SP GR UR STRIP: <=1.005 (ref 1–1.03)
SQUAMOUS #/AREA URNS HPF: 2 /HPF
URN SPEC COLLECT METH UR: ABNORMAL
WBC #/AREA URNS HPF: 4 /HPF (ref 0–5)

## 2020-07-30 PROCEDURE — 87086 URINE CULTURE/COLONY COUNT: CPT

## 2020-07-30 PROCEDURE — 81000 URINALYSIS NONAUTO W/SCOPE: CPT | Mod: PO

## 2020-07-31 ENCOUNTER — EXTERNAL CHRONIC CARE MANAGEMENT (OUTPATIENT)
Dept: PRIMARY CARE CLINIC | Facility: CLINIC | Age: 85
End: 2020-07-31
Payer: MEDICARE

## 2020-07-31 DIAGNOSIS — Z87.440 HX: UTI (URINARY TRACT INFECTION): Primary | ICD-10-CM

## 2020-07-31 DIAGNOSIS — N39.0 URINARY TRACT INFECTION WITHOUT HEMATURIA, SITE UNSPECIFIED: ICD-10-CM

## 2020-07-31 PROCEDURE — 99490 CHRNC CARE MGMT STAFF 1ST 20: CPT | Mod: S$PBB,,, | Performed by: FAMILY MEDICINE

## 2020-07-31 PROCEDURE — 99490 CHRNC CARE MGMT STAFF 1ST 20: CPT | Mod: PBBFAC,PN,25 | Performed by: FAMILY MEDICINE

## 2020-07-31 PROCEDURE — 99490 PR CHRONIC CARE MGMT, 1ST 20 MIN: ICD-10-PCS | Mod: S$PBB,,, | Performed by: FAMILY MEDICINE

## 2020-07-31 PROCEDURE — G2058 CCM ADD 20MIN: HCPCS | Mod: S$PBB,,, | Performed by: FAMILY MEDICINE

## 2020-07-31 PROCEDURE — G2058 PR CHRON CARE MGMT, EA ADDTL 20 MINS: ICD-10-PCS | Mod: S$PBB,,, | Performed by: FAMILY MEDICINE

## 2020-07-31 PROCEDURE — G2058 CCM ADD 20MIN: HCPCS | Mod: PBBFAC,PN | Performed by: FAMILY MEDICINE

## 2020-08-02 LAB — BACTERIA UR CULT: NO GROWTH

## 2020-08-21 ENCOUNTER — LAB VISIT (OUTPATIENT)
Dept: LAB | Facility: HOSPITAL | Age: 85
End: 2020-08-21
Attending: INTERNAL MEDICINE
Payer: MEDICARE

## 2020-08-21 DIAGNOSIS — D51.0 VITAMIN B12 DEFICIENCY ANEMIA DUE TO INTRINSIC FACTOR DEFICIENCY: ICD-10-CM

## 2020-08-21 DIAGNOSIS — D50.0 IRON DEFICIENCY ANEMIA DUE TO CHRONIC BLOOD LOSS: ICD-10-CM

## 2020-08-21 LAB
ALBUMIN SERPL BCP-MCNC: 4.1 G/DL (ref 3.5–5.2)
ALP SERPL-CCNC: 84 U/L (ref 38–145)
ALT SERPL W/O P-5'-P-CCNC: 8 U/L (ref 0–35)
ANION GAP SERPL CALC-SCNC: 7 MMOL/L (ref 8–16)
AST SERPL-CCNC: 22 U/L (ref 14–36)
BASOPHILS # BLD AUTO: 0.04 K/UL (ref 0–0.2)
BASOPHILS NFR BLD: 0.6 % (ref 0–1.9)
BILIRUB SERPL-MCNC: 0.4 MG/DL (ref 0.2–1.3)
BUN SERPL-MCNC: 26 MG/DL (ref 7–18)
CALCIUM SERPL-MCNC: 9.6 MG/DL (ref 8.4–10.2)
CHLORIDE SERPL-SCNC: 102 MMOL/L (ref 95–110)
CO2 SERPL-SCNC: 29 MMOL/L (ref 22–31)
CREAT SERPL-MCNC: 1.39 MG/DL (ref 0.5–1.4)
DIFFERENTIAL METHOD: ABNORMAL
EOSINOPHIL # BLD AUTO: 0.4 K/UL (ref 0–0.5)
EOSINOPHIL NFR BLD: 5.9 % (ref 0–8)
ERYTHROCYTE [DISTWIDTH] IN BLOOD BY AUTOMATED COUNT: 13 % (ref 11.5–14.5)
EST. GFR  (AFRICAN AMERICAN): 40 ML/MIN/1.73 M^2
EST. GFR  (NON AFRICAN AMERICAN): 35 ML/MIN/1.73 M^2
FERRITIN SERPL-MCNC: 262 NG/ML (ref 12–264)
GLUCOSE SERPL-MCNC: 116 MG/DL (ref 70–110)
HCT VFR BLD AUTO: 34.1 % (ref 37–48.5)
HGB BLD-MCNC: 10.7 G/DL (ref 12–16)
IMM GRANULOCYTES # BLD AUTO: 0.02 K/UL (ref 0–0.04)
IMM GRANULOCYTES NFR BLD AUTO: 0.3 % (ref 0–0.5)
IRON SATN MFR SERPL: 32 % (ref 20–50)
IRON SERPL-MCNC: 84 UG/DL (ref 30–160)
LYMPHOCYTES # BLD AUTO: 1.9 K/UL (ref 1–4.8)
LYMPHOCYTES NFR BLD: 27.7 % (ref 18–48)
MCH RBC QN AUTO: 30.3 PG (ref 27–31)
MCHC RBC AUTO-ENTMCNC: 31.4 G/DL (ref 32–36)
MCV RBC AUTO: 97 FL (ref 82–98)
MONOCYTES # BLD AUTO: 0.5 K/UL (ref 0.3–1)
MONOCYTES NFR BLD: 7.2 % (ref 4–15)
NEUTROPHILS # BLD AUTO: 4 K/UL (ref 1.8–7.7)
NEUTROPHILS NFR BLD: 58.3 % (ref 38–73)
NRBC BLD-RTO: 0 /100 WBC
PLATELET # BLD AUTO: 349 K/UL (ref 150–350)
PMV BLD AUTO: 9.2 FL (ref 9.2–12.9)
POTASSIUM SERPL-SCNC: 4.1 MMOL/L (ref 3.5–5.1)
PROT SERPL-MCNC: 7.3 G/DL (ref 6–8.4)
RBC # BLD AUTO: 3.53 M/UL (ref 4–5.4)
SODIUM SERPL-SCNC: 138 MMOL/L (ref 136–145)
TOTAL IRON BINDING CAPACITY: 259 UG/DL (ref 265–497)
WBC # BLD AUTO: 6.93 K/UL (ref 3.9–12.7)

## 2020-08-21 PROCEDURE — 85025 COMPLETE CBC W/AUTO DIFF WBC: CPT | Mod: PN

## 2020-08-21 PROCEDURE — 83540 ASSAY OF IRON: CPT

## 2020-08-21 PROCEDURE — 80053 COMPREHEN METABOLIC PANEL: CPT

## 2020-08-21 PROCEDURE — 80053 COMPREHEN METABOLIC PANEL: CPT | Mod: PN

## 2020-08-21 PROCEDURE — 82728 ASSAY OF FERRITIN: CPT | Mod: PN

## 2020-08-21 PROCEDURE — 82728 ASSAY OF FERRITIN: CPT

## 2020-08-21 PROCEDURE — 85025 COMPLETE CBC W/AUTO DIFF WBC: CPT

## 2020-08-21 PROCEDURE — 83540 ASSAY OF IRON: CPT | Mod: PN

## 2020-08-21 PROCEDURE — 36415 COLL VENOUS BLD VENIPUNCTURE: CPT | Mod: PN

## 2020-08-25 ENCOUNTER — OFFICE VISIT (OUTPATIENT)
Dept: HEMATOLOGY/ONCOLOGY | Facility: CLINIC | Age: 85
End: 2020-08-25
Payer: MEDICARE

## 2020-08-25 ENCOUNTER — TELEPHONE (OUTPATIENT)
Dept: HEMATOLOGY/ONCOLOGY | Facility: CLINIC | Age: 85
End: 2020-08-25

## 2020-08-25 DIAGNOSIS — D50.0 IRON DEFICIENCY ANEMIA DUE TO CHRONIC BLOOD LOSS: ICD-10-CM

## 2020-08-25 DIAGNOSIS — N18.2 ANEMIA IN STAGE 2 CHRONIC KIDNEY DISEASE: ICD-10-CM

## 2020-08-25 DIAGNOSIS — I10 ESSENTIAL HYPERTENSION: ICD-10-CM

## 2020-08-25 DIAGNOSIS — I48.0 PAROXYSMAL ATRIAL FIBRILLATION: ICD-10-CM

## 2020-08-25 DIAGNOSIS — D51.0 VITAMIN B12 DEFICIENCY ANEMIA DUE TO INTRINSIC FACTOR DEFICIENCY: Primary | ICD-10-CM

## 2020-08-25 DIAGNOSIS — D63.1 ANEMIA IN STAGE 2 CHRONIC KIDNEY DISEASE: ICD-10-CM

## 2020-08-25 PROCEDURE — 99214 OFFICE O/P EST MOD 30 MIN: CPT | Mod: 95,,, | Performed by: INTERNAL MEDICINE

## 2020-08-25 PROCEDURE — 99214 PR OFFICE/OUTPT VISIT, EST, LEVL IV, 30-39 MIN: ICD-10-PCS | Mod: 95,,, | Performed by: INTERNAL MEDICINE

## 2020-08-25 NOTE — TELEPHONE ENCOUNTER
Spoke with pt's daughter regarding pt's appt. After speaking with Dr Steven pt can be r/s to tomorrow morning on [facility] schedule for a VV @ 0900 so that pt's daughter could be with pt and then go to work. Pt's daughter wanted to keep appt today and do VV - she will take a late lunch and join her mother. Dr Steven to be notified.

## 2020-08-25 NOTE — PROGRESS NOTES
The patient location is: home  Visit type: Virtual visit with synchronous audio and video  Face-to-face or time spent with patient on the encounter:20 min  Total time spent on and for  this encounter which includes non face-to-face time preparing to see patient, review of tests, obtaining and or reviewing separately obtained records documenting clinical information in the electronic or other health records, independently interpreting results which is not separately reported ,and communicating results to the patient/family/caregiver and in care coordination and treatment planning/communicating with pharmacy for prescriptions/addressing social needs/arranging follow-up and or referrals :2 min    Each patient I provide medical services by telemedicine is:  (1) informed of the relationship between the physician and patient and the respective role of any other health care provider with respect to management of the patient; and (2) notified that he or she may decline to receive medical services by telemedicine and may withdraw from such care at any time.        HISTORY OF PRESENT ILLNESS:  This is a 85-year-old  woman folowing with me for anemia.  .  The patient has anemia of chronic disease.  She has had   postmenopausal bleeding.  Endometrial biopsies were negative for malignancy.    She is no longer having any bleeding.  She continues to see Dr. Recinos for her CAD.    4 month f/u with labs  REVIEW OF SYSTEMS:     CONSTITUTIONAL: T no change, pt is elderly, not very communicative, has been eating less and drinking less than her usual this has been gradual. Daughter reports not much change in weight or activity   had a recent fall due to hypotension so meds have been discontd or adusted per cardiology/ pcp and bp much better, occ. Swelling so takes dyazide 3/week    SKIN: Denies rash, issues with nails, non-healing sores, bleeding, blotching    skin or abnormal bruising. Denies new moles or changes to existing  moles.      BREASTS: There is no swelling around breasts or nipple discharge.    EYES: Denies eye pain, blurred vision, swelling, redness or discharge.      ENT AND MOUTH: Denies runny nose, stuffiness, sinus trouble or sores. Denies    nosebleeds. Denies, hoarseness, change in voice or swelling in front of the    neck.      CARDIOVASCULAR: Denies chest pain, discomfort or palpitations. Denies neck    swelling or episodes of passing out.      RESPIRATORY: Denies cough, sputum production, blood in sputum, and denies    shortness of breath.      GI: Denies trouble swallowing, indigestion, heartburn, abdominal pain, nausea,    vomiting, diarrhea, altered bowel habits, blood in stool, discoloration of    stools, change in nature of stool, bloating, increased abdominal girth.      GENITOURINARY: No discharge. No pelvic pain or lumps. No rash around groin or  lesions. No urinary frequency, hesitation, painful urination or blood in    urine. + incontinence.   MUSCULOSKELETAL: Denies neck or back pain. occ. weakness in arms or legs,  and fall prone  joint problems or distended inflamed veins in legs. Denies swelling or abnormal  glands.      NEUROLOGICAL: Denies tingling, numbness, altered mentation changes to nerve    function in the face, weakness to one or both of the body. Denies changes to    gait and denies multiple falls or accidents.      PSYCHIATRIC: Denies nervousness, anxiety, hallucinations, depression, suicidal    ideation, trouble sleeping or changes in behavior noticed by family.      The patient denies recent foreign travel or recent exposure to chemicals or    products of concern or infectious diseases.                PHYSICAL EXAMINATION:  Wt Readings from Last 3 Encounters:   07/22/20 51.3 kg (113 lb 1.5 oz)   05/25/20 51.3 kg (113 lb 1.5 oz)   12/04/19 51.3 kg (113 lb 1.5 oz)     Temp Readings from Last 3 Encounters:   07/22/20 97.8 °F (36.6 °C) (Oral)   05/25/20 98.5 °F (36.9 °C)   09/30/19 97.6 °F  (36.4 °C) (Oral)     BP Readings from Last 3 Encounters:   07/22/20 90/60   09/30/19 (!) 122/50   07/29/19 (!) 128/58     Pulse Readings from Last 3 Encounters:   07/22/20 (!) 52   09/30/19 (!) 52   07/29/19 62     VITAL SIGNS:  None at this visit.  GENERAL: appears well-built, well-nourished.  No anxiety, no agitation, and in no distress.  Patient is awake, alert, oriented and cooperative.  HEENT:  Showed no congestion. Trachea is central no obvious icterus or pallor noted via video.  NECK:  Supple.  No JVD. No obvious cervical submental or supraclavicular adenopathy.  RS:the visualized portion of  Chest expands well. chest appears symmetric, no audible wheezes.  ABDOMEN: the visualized portion of  abdomen appears undistended.  EXTREMITIES:  Without edema.  NEUROLOGICAL:  The patient is appropriate, higher functions are normal.  No neuro deficits.  No gait abnormality noted.  No confusion, no speech impediment. Cranial nerves are intact and show no deficit. No motor deficits noted through video.  SKIN MUSCULOSKELETAL: no joint or skeletal deformity, ambulating around room, no clubbing of nails.  LABORATORY DATA:    Lab Results   Component Value Date    WBC 6.93 08/21/2020    HGB 10.7 (L) 08/21/2020    HCT 34.1 (L) 08/21/2020    MCV 97 08/21/2020     08/21/2020     CMP  Sodium   Date Value Ref Range Status   08/21/2020 138 136 - 145 mmol/L Final     Potassium   Date Value Ref Range Status   08/21/2020 4.1 3.5 - 5.1 mmol/L Final     Chloride   Date Value Ref Range Status   08/21/2020 102 95 - 110 mmol/L Final     CO2   Date Value Ref Range Status   08/21/2020 29 22 - 31 mmol/L Final     Glucose   Date Value Ref Range Status   08/21/2020 116 (H) 70 - 110 mg/dL Final     Comment:     The ADA recommends the following guidelines for fasting glucose:  Normal:       less than 100 mg/dL  Prediabetes:  100 mg/dL to 125 mg/dL  Diabetes:     126 mg/dL or higher       BUN, Bld   Date Value Ref Range Status   08/21/2020  26 (H) 7 - 18 mg/dL Final     Creatinine   Date Value Ref Range Status   08/21/2020 1.39 0.50 - 1.40 mg/dL Final     Calcium   Date Value Ref Range Status   08/21/2020 9.6 8.4 - 10.2 mg/dL Final     Total Protein   Date Value Ref Range Status   08/21/2020 7.3 6.0 - 8.4 g/dL Final     Albumin   Date Value Ref Range Status   08/21/2020 4.1 3.5 - 5.2 g/dL Final     Total Bilirubin   Date Value Ref Range Status   08/21/2020 0.4 0.2 - 1.3 mg/dL Final     Alkaline Phosphatase   Date Value Ref Range Status   08/21/2020 84 38 - 145 U/L Final     AST   Date Value Ref Range Status   08/21/2020 22 14 - 36 U/L Final     ALT   Date Value Ref Range Status   08/21/2020 8 0 - 35 U/L Final     Anion Gap   Date Value Ref Range Status   08/21/2020 7 (L) 8 - 16 mmol/L Final     eGFR if    Date Value Ref Range Status   08/21/2020 40 (A) >60 mL/min/1.73 m^2 Final     eGFR if non    Date Value Ref Range Status   08/21/2020 35 (A) >60 mL/min/1.73 m^2 Final     Comment:     Calculation used to obtain the estimated glomerular filtration  rate (eGFR) is the CKD-EPI equation.        Lab Results   Component Value Date    IRON 84 08/21/2020    TIBC 259 (L) 08/21/2020    FERRITIN 262 08/21/2020     Lab Results   Component Value Date    PEWHNNLH16 550 05/21/2020     CMP  PLAN:  Continue to monitor,hgb slightly lower this time pt seems to be falling states sh has ear issues, possibly benign positional vertigo BP meds have recently been adjusted due to hypotension   sometimes when she stands I have offered her antiverrt in the past they will think about it. Not now per pt   offered appetitie stimulant , they want to hydrate and see how she does and will call if her appetitie worsens   sees cardiology and pcp off xarelto given for afib due to fall   Rest of anemia workup has been   negative so far and only a bone marrow has not been performed yet.  We will   continue to observe only as the numbers are improving.     Mild CKD :stable follow with pcp  Cont supplement of iron and b12 recheck in 4-5 months with cbc, cmp, iron and b12  Advance Care planning/ directives /living will/patient's wishes discussed with patient.  Patient has been given guidelines and instructions on completing these directives  COVID social distancing, face mask use, hand washing techniques and personal hygiene routine discussed with patient  Good exercise, nutrition and weight management discussed with patient  Health maintenance activities and follow-up with PCPs recommendations discussed with patient

## 2020-08-31 ENCOUNTER — EXTERNAL CHRONIC CARE MANAGEMENT (OUTPATIENT)
Dept: PRIMARY CARE CLINIC | Facility: CLINIC | Age: 85
End: 2020-08-31
Payer: MEDICARE

## 2020-08-31 PROCEDURE — 99490 CHRNC CARE MGMT STAFF 1ST 20: CPT | Mod: PBBFAC,PN | Performed by: FAMILY MEDICINE

## 2020-08-31 PROCEDURE — 99490 CHRNC CARE MGMT STAFF 1ST 20: CPT | Mod: S$PBB,,, | Performed by: FAMILY MEDICINE

## 2020-08-31 PROCEDURE — 99490 PR CHRONIC CARE MGMT, 1ST 20 MIN: ICD-10-PCS | Mod: S$PBB,,, | Performed by: FAMILY MEDICINE

## 2020-09-29 ENCOUNTER — TELEPHONE (OUTPATIENT)
Dept: FAMILY MEDICINE | Facility: CLINIC | Age: 85
End: 2020-09-29

## 2020-09-29 ENCOUNTER — PATIENT MESSAGE (OUTPATIENT)
Dept: OTHER | Facility: OTHER | Age: 85
End: 2020-09-29

## 2020-09-29 ENCOUNTER — TELEPHONE (OUTPATIENT)
Dept: RADIOLOGY | Facility: HOSPITAL | Age: 85
End: 2020-09-29

## 2020-09-29 NOTE — TELEPHONE ENCOUNTER
----- Message from Johnny Thomas sent at 9/29/2020  8:47 AM CDT -----  Regarding: return call  Contact: self  Type:  Patient Returning Call    Who Called: self  Who Left Message for Patient:  Marjorie  Does the patient know what this is regarding?:    Best Call Back Number:  [phone]  Additional Information:

## 2020-09-29 NOTE — TELEPHONE ENCOUNTER
Pt's daughter needs most recent mammo on a disc to be picked up  1/23/2013, [facility] mammo staff will leave it at the  for pick-up

## 2020-09-29 NOTE — TELEPHONE ENCOUNTER
----- Message from Aurea Mcleod sent at 9/28/2020 12:34 PM CDT -----  Regarding: return call/speak to nurse about last mammo  Contact: [place]/Daughter  Type: Needs Medical Advice  Who Called:  patient  Symptoms (please be specific):  na  How long has patient had these symptoms:  tali  Pharmacy name and phone #:  na  Best Call Back Number: [phone]  Additional Information: Virginia states she would like to know when the last time patient had a mammogram.  Please call to advise.  Thanks!

## 2020-09-30 ENCOUNTER — TELEPHONE (OUTPATIENT)
Dept: FAMILY MEDICINE | Facility: CLINIC | Age: 85
End: 2020-09-30

## 2020-09-30 ENCOUNTER — EXTERNAL CHRONIC CARE MANAGEMENT (OUTPATIENT)
Dept: PRIMARY CARE CLINIC | Facility: CLINIC | Age: 85
End: 2020-09-30
Payer: MEDICARE

## 2020-09-30 PROCEDURE — 99490 CHRNC CARE MGMT STAFF 1ST 20: CPT | Mod: S$PBB,,, | Performed by: FAMILY MEDICINE

## 2020-09-30 PROCEDURE — 99490 PR CHRONIC CARE MGMT, 1ST 20 MIN: ICD-10-PCS | Mod: S$PBB,,, | Performed by: FAMILY MEDICINE

## 2020-09-30 PROCEDURE — 99490 CHRNC CARE MGMT STAFF 1ST 20: CPT | Mod: PBBFAC,PN | Performed by: FAMILY MEDICINE

## 2020-09-30 NOTE — TELEPHONE ENCOUNTER
----- Message from Grover Bridges sent at 9/29/2020  1:13 PM CDT -----  Regarding: call back  Type: Patient Call Back    Who called:[place]ia(daughter)    What is the request in detail: [place], daughter of the patient is returning a call to Ms. Marjorie    Can the clinic reply by [facility]?no    Would the patient rather a call back or a response via [facility]? Call back     Best call back number:[phone]

## 2020-10-31 ENCOUNTER — EXTERNAL CHRONIC CARE MANAGEMENT (OUTPATIENT)
Dept: PRIMARY CARE CLINIC | Facility: CLINIC | Age: 85
End: 2020-10-31
Payer: MEDICARE

## 2020-10-31 PROCEDURE — 99490 CHRNC CARE MGMT STAFF 1ST 20: CPT | Mod: S$PBB,,, | Performed by: FAMILY MEDICINE

## 2020-10-31 PROCEDURE — 99490 PR CHRONIC CARE MGMT, 1ST 20 MIN: ICD-10-PCS | Mod: S$PBB,,, | Performed by: FAMILY MEDICINE

## 2020-10-31 PROCEDURE — 99490 CHRNC CARE MGMT STAFF 1ST 20: CPT | Mod: PBBFAC,PN | Performed by: FAMILY MEDICINE

## 2020-11-30 ENCOUNTER — EXTERNAL CHRONIC CARE MANAGEMENT (OUTPATIENT)
Dept: PRIMARY CARE CLINIC | Facility: CLINIC | Age: 85
End: 2020-11-30
Payer: MEDICARE

## 2020-11-30 PROCEDURE — 99490 PR CHRONIC CARE MGMT, 1ST 20 MIN: ICD-10-PCS | Mod: S$PBB,,, | Performed by: FAMILY MEDICINE

## 2020-11-30 PROCEDURE — 99490 CHRNC CARE MGMT STAFF 1ST 20: CPT | Mod: PBBFAC,PN | Performed by: FAMILY MEDICINE

## 2020-11-30 PROCEDURE — 99490 CHRNC CARE MGMT STAFF 1ST 20: CPT | Mod: S$PBB,,, | Performed by: FAMILY MEDICINE

## 2020-12-02 ENCOUNTER — PATIENT OUTREACH (OUTPATIENT)
Dept: ADMINISTRATIVE | Facility: HOSPITAL | Age: 85
End: 2020-12-02

## 2020-12-11 ENCOUNTER — PATIENT MESSAGE (OUTPATIENT)
Dept: OTHER | Facility: OTHER | Age: 85
End: 2020-12-11

## 2020-12-21 ENCOUNTER — OFFICE VISIT (OUTPATIENT)
Dept: FAMILY MEDICINE | Facility: CLINIC | Age: 85
End: 2020-12-21
Payer: MEDICARE

## 2020-12-21 VITALS
SYSTOLIC BLOOD PRESSURE: 102 MMHG | WEIGHT: 112.13 LBS | HEART RATE: 56 BPM | TEMPERATURE: 98 F | DIASTOLIC BLOOD PRESSURE: 48 MMHG | BODY MASS INDEX: 21.17 KG/M2 | HEIGHT: 61 IN

## 2020-12-21 DIAGNOSIS — M17.0 OSTEOARTHRITIS OF BOTH KNEES, UNSPECIFIED OSTEOARTHRITIS TYPE: ICD-10-CM

## 2020-12-21 DIAGNOSIS — I10 ESSENTIAL HYPERTENSION: Primary | ICD-10-CM

## 2020-12-21 PROCEDURE — 99214 OFFICE O/P EST MOD 30 MIN: CPT | Mod: S$PBB,,, | Performed by: FAMILY MEDICINE

## 2020-12-21 PROCEDURE — 99214 OFFICE O/P EST MOD 30 MIN: CPT | Mod: PBBFAC,PN | Performed by: FAMILY MEDICINE

## 2020-12-21 PROCEDURE — 99214 PR OFFICE/OUTPT VISIT, EST, LEVL IV, 30-39 MIN: ICD-10-PCS | Mod: S$PBB,,, | Performed by: FAMILY MEDICINE

## 2020-12-21 RX ORDER — TRIAMTERENE/HYDROCHLOROTHIAZID 37.5-25 MG
1 TABLET ORAL
Qty: 90 TABLET | Refills: 3 | Status: SHIPPED | OUTPATIENT
Start: 2020-12-21 | End: 2022-03-15

## 2020-12-21 RX ORDER — TRIAMTERENE/HYDROCHLOROTHIAZID 37.5-25 MG
1 TABLET ORAL
COMMUNITY
End: 2020-12-21 | Stop reason: SDUPTHER

## 2020-12-21 RX ORDER — ASPIRIN 81 MG/1
81 TABLET ORAL DAILY
COMMUNITY

## 2020-12-21 RX ORDER — MIRTAZAPINE 15 MG/1
15 TABLET, FILM COATED ORAL NIGHTLY
COMMUNITY
End: 2021-05-18 | Stop reason: ALTCHOICE

## 2020-12-21 NOTE — PROGRESS NOTES
"Subjective:       Patient ID: Sobeida Mcclain is a 85 y.o. female.    Chief Complaint: Leg Pain (L leg pain x 2wk. no known trauma. Some swelling around the knee last week. ) and Foot Pain (Pain "on bottom of my feet")    Left leg pain for the past 2 weeks.  She is here with her daughter.  She has a past history of knee arthritis.  She relates her pain as anterior left knee with also some swelling and prominence of the veins on the posterior calf.  Sometimes she wears support stockings.  I reviewed some previous visits and knee x-rays that show severe osteoarthritis in both knees but worse on the left.  She sometimes walks with a cane.  She is not interested in vaccination.  She does have a persistent cough.  No history of COPD and she is not a smoker.  She had a hospital admission this summer for hypotension.  Dr. Recinos is her cardiologist.    Review of Systems   Constitutional: Negative for unexpected weight change.   Respiratory: Positive for cough. Negative for shortness of breath.          Objective:     Blood pressure (!) 102/48, pulse (!) 56, temperature 97.9 °F (36.6 °C), temperature source Temporal, height 5' 1" (1.549 m), weight 50.8 kg (112 lb 1.7 oz).      Physical Exam  Constitutional:       General: She is not in acute distress.     Appearance: She is normal weight.      Comments: She is able to get out of the chair unassisted and climbs up onto the table carefully with holding on.   Cardiovascular:      Rate and Rhythm: Normal rate and regular rhythm.   Pulmonary:      Effort: Pulmonary effort is normal. No respiratory distress.      Comments: She has some posterior rhonchi and a loose sounding cough.  Abdominal:      General: There is no distension.      Tenderness: There is no abdominal tenderness.   Musculoskeletal:      Comments: Right knee with range of motion of 5° to 145°.  Some pain with varus and valgus strain.    Left knee with range of motion of 10° to 120°.  Mild medial joint line " tenderness.  Medial and lateral patellar border tenderness with crepitance.  No popliteal tenderness.  She has a few varicose veins posterior left calf.  Nontender.   Neurological:      Mental Status: She is alert.         Assessment:       1. Essential hypertension    2. Osteoarthritis of both knees, unspecified osteoarthritis type        Plan:       We discussed options for her knee arthritis.  She does not want an oral medication or an injection today.  I discussed some topical options.  We can consider other options should her pain worsen.  Her knee pain is less severe today than last week.  I refilled her Dyazide.

## 2020-12-21 NOTE — PATIENT INSTRUCTIONS
Consider aspercreme with lidocaine, patch or cream to help with knee pain.   Consider joint injection if the pain is worse.

## 2020-12-31 ENCOUNTER — EXTERNAL CHRONIC CARE MANAGEMENT (OUTPATIENT)
Dept: PRIMARY CARE CLINIC | Facility: CLINIC | Age: 85
End: 2020-12-31
Payer: MEDICARE

## 2020-12-31 PROCEDURE — 99490 CHRNC CARE MGMT STAFF 1ST 20: CPT | Mod: S$PBB,,, | Performed by: FAMILY MEDICINE

## 2020-12-31 PROCEDURE — 99490 CHRNC CARE MGMT STAFF 1ST 20: CPT | Mod: PBBFAC,PN | Performed by: FAMILY MEDICINE

## 2020-12-31 PROCEDURE — 99490 PR CHRONIC CARE MGMT, 1ST 20 MIN: ICD-10-PCS | Mod: S$PBB,,, | Performed by: FAMILY MEDICINE

## 2021-01-25 ENCOUNTER — LAB VISIT (OUTPATIENT)
Dept: LAB | Facility: HOSPITAL | Age: 86
End: 2021-01-25
Attending: INTERNAL MEDICINE
Payer: MEDICARE

## 2021-01-25 DIAGNOSIS — I10 ESSENTIAL HYPERTENSION: ICD-10-CM

## 2021-01-25 DIAGNOSIS — D50.0 IRON DEFICIENCY ANEMIA DUE TO CHRONIC BLOOD LOSS: ICD-10-CM

## 2021-01-25 DIAGNOSIS — D51.0 VITAMIN B12 DEFICIENCY ANEMIA DUE TO INTRINSIC FACTOR DEFICIENCY: ICD-10-CM

## 2021-01-25 LAB
ALBUMIN SERPL BCP-MCNC: 4 G/DL (ref 3.5–5.2)
ALP SERPL-CCNC: 72 U/L (ref 38–145)
ALT SERPL W/O P-5'-P-CCNC: 4 U/L (ref 0–35)
ANION GAP SERPL CALC-SCNC: 8 MMOL/L (ref 8–16)
AST SERPL-CCNC: 20 U/L (ref 14–36)
BASOPHILS # BLD AUTO: 0.03 K/UL (ref 0–0.2)
BASOPHILS NFR BLD: 0.5 % (ref 0–1.9)
BILIRUB SERPL-MCNC: 0.4 MG/DL (ref 0.2–1.3)
CALCIUM SERPL-MCNC: 9.7 MG/DL (ref 8.4–10.2)
CHLORIDE SERPL-SCNC: 106 MMOL/L (ref 95–110)
CHOLEST SERPL-MCNC: 148 MG/DL (ref 120–199)
CHOLEST/HDLC SERPL: 3.6 {RATIO} (ref 2–5)
CO2 SERPL-SCNC: 29 MMOL/L (ref 22–31)
CREAT SERPL-MCNC: 1.25 MG/DL (ref 0.5–1.4)
DIFFERENTIAL METHOD: ABNORMAL
EOSINOPHIL # BLD AUTO: 0.4 K/UL (ref 0–0.5)
EOSINOPHIL NFR BLD: 7 % (ref 0–8)
ERYTHROCYTE [DISTWIDTH] IN BLOOD BY AUTOMATED COUNT: 12.9 % (ref 11.5–14.5)
EST. GFR  (AFRICAN AMERICAN): 46 ML/MIN/1.73 M^2
EST. GFR  (NON AFRICAN AMERICAN): 40 ML/MIN/1.73 M^2
FERRITIN SERPL-MCNC: 191 NG/ML (ref 12–264)
GLUCOSE SERPL-MCNC: 98 MG/DL (ref 70–110)
HCT VFR BLD AUTO: 34.5 % (ref 37–48.5)
HDLC SERPL-MCNC: 41 MG/DL (ref 40–75)
HDLC SERPL: 27.7 % (ref 20–50)
HGB BLD-MCNC: 11 G/DL (ref 12–16)
IMM GRANULOCYTES # BLD AUTO: 0.01 K/UL (ref 0–0.04)
IMM GRANULOCYTES NFR BLD AUTO: 0.2 % (ref 0–0.5)
IRON SATN MFR SERPL: 32 % (ref 20–50)
IRON SERPL-MCNC: 80 UG/DL (ref 30–160)
LDLC SERPL CALC-MCNC: 84.6 MG/DL (ref 63–159)
LYMPHOCYTES # BLD AUTO: 1.7 K/UL (ref 1–4.8)
LYMPHOCYTES NFR BLD: 30.3 % (ref 18–48)
MCH RBC QN AUTO: 30.9 PG (ref 27–31)
MCHC RBC AUTO-ENTMCNC: 31.9 G/DL (ref 32–36)
MCV RBC AUTO: 97 FL (ref 82–98)
MONOCYTES # BLD AUTO: 0.5 K/UL (ref 0.3–1)
MONOCYTES NFR BLD: 8.2 % (ref 4–15)
NEUTROPHILS # BLD AUTO: 3.1 K/UL (ref 1.8–7.7)
NEUTROPHILS NFR BLD: 53.8 % (ref 38–73)
NONHDLC SERPL-MCNC: 107 MG/DL
NRBC BLD-RTO: 0 /100 WBC
PLATELET # BLD AUTO: 252 K/UL (ref 150–350)
PMV BLD AUTO: 9.7 FL (ref 9.2–12.9)
POTASSIUM SERPL-SCNC: 4.7 MMOL/L (ref 3.5–5.1)
PROT SERPL-MCNC: 6.9 G/DL (ref 6–8.4)
RBC # BLD AUTO: 3.56 M/UL (ref 4–5.4)
SODIUM SERPL-SCNC: 143 MMOL/L (ref 136–145)
TOTAL IRON BINDING CAPACITY: 251 UG/DL (ref 265–497)
TRIGL SERPL-MCNC: 112 MG/DL (ref 30–150)
UUN UR-MCNC: 17 MG/DL (ref 7–18)
VIT B12 SERPL-MCNC: 605 PG/ML (ref 210–950)
WBC # BLD AUTO: 5.74 K/UL (ref 3.9–12.7)

## 2021-01-25 PROCEDURE — 83550 IRON BINDING TEST: CPT

## 2021-01-25 PROCEDURE — 85025 COMPLETE CBC W/AUTO DIFF WBC: CPT | Mod: PN

## 2021-01-25 PROCEDURE — 80053 COMPREHEN METABOLIC PANEL: CPT | Mod: PN

## 2021-01-25 PROCEDURE — 83550 IRON BINDING TEST: CPT | Mod: PN

## 2021-01-25 PROCEDURE — 82607 VITAMIN B-12: CPT

## 2021-01-25 PROCEDURE — 82728 ASSAY OF FERRITIN: CPT

## 2021-01-25 PROCEDURE — 82607 VITAMIN B-12: CPT | Mod: PN

## 2021-01-25 PROCEDURE — 80061 LIPID PANEL: CPT

## 2021-01-25 PROCEDURE — 82728 ASSAY OF FERRITIN: CPT | Mod: PN

## 2021-01-25 PROCEDURE — 80061 LIPID PANEL: CPT | Mod: PN

## 2021-01-25 PROCEDURE — 80053 COMPREHEN METABOLIC PANEL: CPT

## 2021-01-25 PROCEDURE — 83540 ASSAY OF IRON: CPT

## 2021-01-25 PROCEDURE — 83540 ASSAY OF IRON: CPT | Mod: PN

## 2021-01-25 PROCEDURE — 85025 COMPLETE CBC W/AUTO DIFF WBC: CPT

## 2021-01-25 PROCEDURE — 36415 COLL VENOUS BLD VENIPUNCTURE: CPT | Mod: PN

## 2021-01-31 ENCOUNTER — EXTERNAL CHRONIC CARE MANAGEMENT (OUTPATIENT)
Dept: PRIMARY CARE CLINIC | Facility: CLINIC | Age: 86
End: 2021-01-31
Payer: MEDICARE

## 2021-01-31 PROCEDURE — 99490 CHRNC CARE MGMT STAFF 1ST 20: CPT | Mod: S$PBB,,, | Performed by: FAMILY MEDICINE

## 2021-01-31 PROCEDURE — 99490 PR CHRONIC CARE MGMT, 1ST 20 MIN: ICD-10-PCS | Mod: S$PBB,,, | Performed by: FAMILY MEDICINE

## 2021-01-31 PROCEDURE — 99490 CHRNC CARE MGMT STAFF 1ST 20: CPT | Mod: PBBFAC,PN | Performed by: FAMILY MEDICINE

## 2021-02-26 ENCOUNTER — TELEPHONE (OUTPATIENT)
Dept: HEMATOLOGY/ONCOLOGY | Facility: CLINIC | Age: 86
End: 2021-02-26

## 2021-02-28 ENCOUNTER — EXTERNAL CHRONIC CARE MANAGEMENT (OUTPATIENT)
Dept: PRIMARY CARE CLINIC | Facility: CLINIC | Age: 86
End: 2021-02-28
Payer: MEDICARE

## 2021-02-28 PROCEDURE — 99490 PR CHRONIC CARE MGMT, 1ST 20 MIN: ICD-10-PCS | Mod: S$PBB,,, | Performed by: FAMILY MEDICINE

## 2021-02-28 PROCEDURE — 99490 CHRNC CARE MGMT STAFF 1ST 20: CPT | Mod: S$PBB,,, | Performed by: FAMILY MEDICINE

## 2021-02-28 PROCEDURE — 99490 CHRNC CARE MGMT STAFF 1ST 20: CPT | Mod: PBBFAC,PN | Performed by: FAMILY MEDICINE

## 2021-03-31 ENCOUNTER — EXTERNAL CHRONIC CARE MANAGEMENT (OUTPATIENT)
Dept: PRIMARY CARE CLINIC | Facility: CLINIC | Age: 86
End: 2021-03-31
Payer: MEDICARE

## 2021-03-31 PROCEDURE — 99439 CHRNC CARE MGMT STAF EA ADDL: CPT | Mod: S$PBB,,, | Performed by: FAMILY MEDICINE

## 2021-03-31 PROCEDURE — 99490 PR CHRONIC CARE MGMT, 1ST 20 MIN: ICD-10-PCS | Mod: S$PBB,,, | Performed by: FAMILY MEDICINE

## 2021-03-31 PROCEDURE — 99490 CHRNC CARE MGMT STAFF 1ST 20: CPT | Mod: PBBFAC,PN | Performed by: FAMILY MEDICINE

## 2021-03-31 PROCEDURE — 99439 PR CHRONIC CARE MGMT, EA ADDTL 20 MIN: ICD-10-PCS | Mod: S$PBB,,, | Performed by: FAMILY MEDICINE

## 2021-03-31 PROCEDURE — 99490 CHRNC CARE MGMT STAFF 1ST 20: CPT | Mod: S$PBB,,, | Performed by: FAMILY MEDICINE

## 2021-03-31 PROCEDURE — 99439 CHRNC CARE MGMT STAF EA ADDL: CPT | Mod: PBBFAC,PN | Performed by: FAMILY MEDICINE

## 2021-04-21 RX ORDER — PRAVASTATIN SODIUM 20 MG/1
TABLET ORAL
Qty: 90 TABLET | Refills: 0 | Status: SHIPPED | OUTPATIENT
Start: 2021-04-21 | End: 2021-07-14

## 2021-04-22 ENCOUNTER — OFFICE VISIT (OUTPATIENT)
Dept: FAMILY MEDICINE | Facility: CLINIC | Age: 86
End: 2021-04-22
Payer: MEDICARE

## 2021-04-22 VITALS
SYSTOLIC BLOOD PRESSURE: 116 MMHG | WEIGHT: 112.63 LBS | OXYGEN SATURATION: 99 % | HEIGHT: 61 IN | HEART RATE: 54 BPM | DIASTOLIC BLOOD PRESSURE: 56 MMHG | BODY MASS INDEX: 21.27 KG/M2

## 2021-04-22 DIAGNOSIS — I10 HYPERTENSION, UNSPECIFIED TYPE: ICD-10-CM

## 2021-04-22 DIAGNOSIS — J30.2 SEASONAL ALLERGIES: ICD-10-CM

## 2021-04-22 DIAGNOSIS — M25.562 ACUTE PAIN OF LEFT KNEE: ICD-10-CM

## 2021-04-22 DIAGNOSIS — Z98.890 HISTORY OF ESOPHAGEAL DILATATION: ICD-10-CM

## 2021-04-22 DIAGNOSIS — R13.14 PHARYNGOESOPHAGEAL DYSPHAGIA: ICD-10-CM

## 2021-04-22 DIAGNOSIS — M17.0 OSTEOARTHRITIS OF BOTH KNEES, UNSPECIFIED OSTEOARTHRITIS TYPE: Primary | ICD-10-CM

## 2021-04-22 DIAGNOSIS — R05.9 COUGH: ICD-10-CM

## 2021-04-22 PROCEDURE — 99999 PR PBB SHADOW E&M-EST. PATIENT-LVL IV: CPT | Mod: PBBFAC,,, | Performed by: INTERNAL MEDICINE

## 2021-04-22 PROCEDURE — 99214 OFFICE O/P EST MOD 30 MIN: CPT | Mod: PBBFAC,PN | Performed by: INTERNAL MEDICINE

## 2021-04-22 PROCEDURE — 99999 PR PBB SHADOW E&M-EST. PATIENT-LVL IV: ICD-10-PCS | Mod: PBBFAC,,, | Performed by: INTERNAL MEDICINE

## 2021-04-22 PROCEDURE — 99214 PR OFFICE/OUTPT VISIT, EST, LEVL IV, 30-39 MIN: ICD-10-PCS | Mod: S$PBB,,, | Performed by: INTERNAL MEDICINE

## 2021-04-22 PROCEDURE — 99214 OFFICE O/P EST MOD 30 MIN: CPT | Mod: S$PBB,,, | Performed by: INTERNAL MEDICINE

## 2021-04-22 RX ORDER — OMEPRAZOLE 40 MG/1
40 CAPSULE, DELAYED RELEASE ORAL DAILY
Qty: 30 CAPSULE | Refills: 1 | Status: SHIPPED | OUTPATIENT
Start: 2021-04-22 | End: 2021-05-18

## 2021-04-22 RX ORDER — LORATADINE 10 MG/1
10 TABLET ORAL DAILY PRN
Qty: 30 TABLET | Refills: 0 | Status: SHIPPED | OUTPATIENT
Start: 2021-04-22 | End: 2021-05-18

## 2021-04-30 ENCOUNTER — EXTERNAL CHRONIC CARE MANAGEMENT (OUTPATIENT)
Dept: PRIMARY CARE CLINIC | Facility: CLINIC | Age: 86
End: 2021-04-30
Payer: MEDICARE

## 2021-04-30 PROCEDURE — 99490 CHRNC CARE MGMT STAFF 1ST 20: CPT | Mod: S$PBB,,, | Performed by: FAMILY MEDICINE

## 2021-04-30 PROCEDURE — 99439 PR CHRONIC CARE MGMT, EA ADDTL 20 MIN: ICD-10-PCS | Mod: S$PBB,,, | Performed by: FAMILY MEDICINE

## 2021-04-30 PROCEDURE — 99490 CHRNC CARE MGMT STAFF 1ST 20: CPT | Mod: PBBFAC,PN | Performed by: FAMILY MEDICINE

## 2021-04-30 PROCEDURE — 99439 CHRNC CARE MGMT STAF EA ADDL: CPT | Mod: PBBFAC,PN | Performed by: FAMILY MEDICINE

## 2021-04-30 PROCEDURE — 99490 PR CHRONIC CARE MGMT, 1ST 20 MIN: ICD-10-PCS | Mod: S$PBB,,, | Performed by: FAMILY MEDICINE

## 2021-04-30 PROCEDURE — 99439 CHRNC CARE MGMT STAF EA ADDL: CPT | Mod: S$PBB,,, | Performed by: FAMILY MEDICINE

## 2021-05-18 ENCOUNTER — OFFICE VISIT (OUTPATIENT)
Dept: FAMILY MEDICINE | Facility: CLINIC | Age: 86
End: 2021-05-18
Payer: MEDICARE

## 2021-05-18 VITALS
WEIGHT: 113.31 LBS | DIASTOLIC BLOOD PRESSURE: 54 MMHG | SYSTOLIC BLOOD PRESSURE: 112 MMHG | HEIGHT: 61 IN | BODY MASS INDEX: 21.39 KG/M2 | HEART RATE: 60 BPM

## 2021-05-18 DIAGNOSIS — R63.0 DECREASED APPETITE: ICD-10-CM

## 2021-05-18 DIAGNOSIS — M25.562 CHRONIC PAIN OF LEFT KNEE: Primary | ICD-10-CM

## 2021-05-18 DIAGNOSIS — R13.10 DYSPHAGIA, UNSPECIFIED TYPE: ICD-10-CM

## 2021-05-18 DIAGNOSIS — G89.29 CHRONIC PAIN OF LEFT KNEE: Primary | ICD-10-CM

## 2021-05-18 PROCEDURE — 99999 PR PBB SHADOW E&M-EST. PATIENT-LVL III: ICD-10-PCS | Mod: PBBFAC,,, | Performed by: FAMILY MEDICINE

## 2021-05-18 PROCEDURE — 99214 PR OFFICE/OUTPT VISIT, EST, LEVL IV, 30-39 MIN: ICD-10-PCS | Mod: S$PBB,,, | Performed by: FAMILY MEDICINE

## 2021-05-18 PROCEDURE — 99214 OFFICE O/P EST MOD 30 MIN: CPT | Mod: S$PBB,,, | Performed by: FAMILY MEDICINE

## 2021-05-18 PROCEDURE — 99213 OFFICE O/P EST LOW 20 MIN: CPT | Mod: PBBFAC,PN | Performed by: FAMILY MEDICINE

## 2021-05-18 PROCEDURE — 99999 PR PBB SHADOW E&M-EST. PATIENT-LVL III: CPT | Mod: PBBFAC,,, | Performed by: FAMILY MEDICINE

## 2021-05-31 ENCOUNTER — EXTERNAL CHRONIC CARE MANAGEMENT (OUTPATIENT)
Dept: PRIMARY CARE CLINIC | Facility: CLINIC | Age: 86
End: 2021-05-31
Payer: MEDICARE

## 2021-05-31 PROCEDURE — 99490 PR CHRONIC CARE MGMT, 1ST 20 MIN: ICD-10-PCS | Mod: S$PBB,,, | Performed by: FAMILY MEDICINE

## 2021-05-31 PROCEDURE — 99490 CHRNC CARE MGMT STAFF 1ST 20: CPT | Mod: PBBFAC,PN | Performed by: FAMILY MEDICINE

## 2021-05-31 PROCEDURE — 99490 CHRNC CARE MGMT STAFF 1ST 20: CPT | Mod: S$PBB,,, | Performed by: FAMILY MEDICINE

## 2021-06-03 DIAGNOSIS — J30.2 SEASONAL ALLERGIES: ICD-10-CM

## 2021-06-04 RX ORDER — LORATADINE 10 MG/1
TABLET ORAL
Qty: 90 TABLET | Refills: 1 | Status: SHIPPED | OUTPATIENT
Start: 2021-06-04 | End: 2022-06-01

## 2021-06-30 ENCOUNTER — EXTERNAL CHRONIC CARE MANAGEMENT (OUTPATIENT)
Dept: PRIMARY CARE CLINIC | Facility: CLINIC | Age: 86
End: 2021-06-30
Payer: MEDICARE

## 2021-06-30 PROCEDURE — 99490 CHRNC CARE MGMT STAFF 1ST 20: CPT | Mod: S$PBB,,, | Performed by: FAMILY MEDICINE

## 2021-06-30 PROCEDURE — 99490 PR CHRONIC CARE MGMT, 1ST 20 MIN: ICD-10-PCS | Mod: S$PBB,,, | Performed by: FAMILY MEDICINE

## 2021-06-30 PROCEDURE — 99490 CHRNC CARE MGMT STAFF 1ST 20: CPT | Mod: PBBFAC,PN | Performed by: FAMILY MEDICINE

## 2021-07-14 RX ORDER — PRAVASTATIN SODIUM 20 MG/1
TABLET ORAL
Qty: 90 TABLET | Refills: 3 | Status: SHIPPED | OUTPATIENT
Start: 2021-07-14 | End: 2022-09-06

## 2021-07-31 ENCOUNTER — EXTERNAL CHRONIC CARE MANAGEMENT (OUTPATIENT)
Dept: PRIMARY CARE CLINIC | Facility: CLINIC | Age: 86
End: 2021-07-31
Payer: MEDICARE

## 2021-07-31 PROCEDURE — 99490 CHRNC CARE MGMT STAFF 1ST 20: CPT | Mod: PBBFAC,PN | Performed by: FAMILY MEDICINE

## 2021-07-31 PROCEDURE — 99490 PR CHRONIC CARE MGMT, 1ST 20 MIN: ICD-10-PCS | Mod: S$PBB,,, | Performed by: FAMILY MEDICINE

## 2021-07-31 PROCEDURE — 99490 CHRNC CARE MGMT STAFF 1ST 20: CPT | Mod: S$PBB,,, | Performed by: FAMILY MEDICINE

## 2021-08-31 ENCOUNTER — EXTERNAL CHRONIC CARE MANAGEMENT (OUTPATIENT)
Dept: PRIMARY CARE CLINIC | Facility: CLINIC | Age: 86
End: 2021-08-31
Payer: MEDICARE

## 2021-08-31 PROCEDURE — 99439 CHRNC CARE MGMT STAF EA ADDL: CPT | Mod: PBBFAC,PN | Performed by: FAMILY MEDICINE

## 2021-08-31 PROCEDURE — 99490 CHRNC CARE MGMT STAFF 1ST 20: CPT | Mod: S$PBB,,, | Performed by: FAMILY MEDICINE

## 2021-08-31 PROCEDURE — 99490 CHRNC CARE MGMT STAFF 1ST 20: CPT | Mod: PBBFAC,PN | Performed by: FAMILY MEDICINE

## 2021-08-31 PROCEDURE — 99490 PR CHRONIC CARE MGMT, 1ST 20 MIN: ICD-10-PCS | Mod: S$PBB,,, | Performed by: FAMILY MEDICINE

## 2021-08-31 PROCEDURE — 99439 CHRNC CARE MGMT STAF EA ADDL: CPT | Mod: S$PBB,,, | Performed by: FAMILY MEDICINE

## 2021-08-31 PROCEDURE — 99439 PR CHRONIC CARE MGMT, EA ADDTL 20 MIN: ICD-10-PCS | Mod: S$PBB,,, | Performed by: FAMILY MEDICINE

## 2021-09-30 ENCOUNTER — EXTERNAL CHRONIC CARE MANAGEMENT (OUTPATIENT)
Dept: PRIMARY CARE CLINIC | Facility: CLINIC | Age: 86
End: 2021-09-30
Payer: MEDICARE

## 2021-09-30 PROCEDURE — 99490 CHRNC CARE MGMT STAFF 1ST 20: CPT | Mod: PBBFAC,PN | Performed by: FAMILY MEDICINE

## 2021-09-30 PROCEDURE — 99490 CHRNC CARE MGMT STAFF 1ST 20: CPT | Mod: S$PBB,,, | Performed by: FAMILY MEDICINE

## 2021-09-30 PROCEDURE — 99439 CHRNC CARE MGMT STAF EA ADDL: CPT | Mod: PBBFAC,PN | Performed by: FAMILY MEDICINE

## 2021-09-30 PROCEDURE — 99490 PR CHRONIC CARE MGMT, 1ST 20 MIN: ICD-10-PCS | Mod: S$PBB,,, | Performed by: FAMILY MEDICINE

## 2021-09-30 PROCEDURE — 99439 PR CHRONIC CARE MGMT, EA ADDTL 20 MIN: ICD-10-PCS | Mod: S$PBB,,, | Performed by: FAMILY MEDICINE

## 2021-09-30 PROCEDURE — 99439 CHRNC CARE MGMT STAF EA ADDL: CPT | Mod: S$PBB,,, | Performed by: FAMILY MEDICINE

## 2021-10-31 ENCOUNTER — EXTERNAL CHRONIC CARE MANAGEMENT (OUTPATIENT)
Dept: PRIMARY CARE CLINIC | Facility: CLINIC | Age: 86
End: 2021-10-31
Payer: MEDICARE

## 2021-10-31 PROCEDURE — 99490 CHRNC CARE MGMT STAFF 1ST 20: CPT | Mod: S$PBB,,, | Performed by: FAMILY MEDICINE

## 2021-10-31 PROCEDURE — 99490 CHRNC CARE MGMT STAFF 1ST 20: CPT | Mod: PBBFAC,PN | Performed by: FAMILY MEDICINE

## 2021-10-31 PROCEDURE — 99490 PR CHRONIC CARE MGMT, 1ST 20 MIN: ICD-10-PCS | Mod: S$PBB,,, | Performed by: FAMILY MEDICINE

## 2021-11-30 ENCOUNTER — EXTERNAL CHRONIC CARE MANAGEMENT (OUTPATIENT)
Dept: PRIMARY CARE CLINIC | Facility: CLINIC | Age: 86
End: 2021-11-30
Payer: MEDICARE

## 2021-11-30 PROCEDURE — 99490 CHRNC CARE MGMT STAFF 1ST 20: CPT | Mod: S$PBB,,, | Performed by: FAMILY MEDICINE

## 2021-11-30 PROCEDURE — 99490 PR CHRONIC CARE MGMT, 1ST 20 MIN: ICD-10-PCS | Mod: S$PBB,,, | Performed by: FAMILY MEDICINE

## 2021-11-30 PROCEDURE — 99490 CHRNC CARE MGMT STAFF 1ST 20: CPT | Mod: PBBFAC,PN | Performed by: FAMILY MEDICINE

## 2021-12-31 ENCOUNTER — EXTERNAL CHRONIC CARE MANAGEMENT (OUTPATIENT)
Dept: PRIMARY CARE CLINIC | Facility: CLINIC | Age: 86
End: 2021-12-31
Payer: MEDICARE

## 2021-12-31 PROCEDURE — 99490 PR CHRONIC CARE MGMT, 1ST 20 MIN: ICD-10-PCS | Mod: S$PBB,,, | Performed by: FAMILY MEDICINE

## 2021-12-31 PROCEDURE — 99490 CHRNC CARE MGMT STAFF 1ST 20: CPT | Mod: PBBFAC,PN | Performed by: FAMILY MEDICINE

## 2021-12-31 PROCEDURE — 99490 CHRNC CARE MGMT STAFF 1ST 20: CPT | Mod: S$PBB,,, | Performed by: FAMILY MEDICINE

## 2022-01-31 ENCOUNTER — EXTERNAL CHRONIC CARE MANAGEMENT (OUTPATIENT)
Dept: PRIMARY CARE CLINIC | Facility: CLINIC | Age: 87
End: 2022-01-31
Payer: MEDICARE

## 2022-01-31 PROCEDURE — 99490 CHRNC CARE MGMT STAFF 1ST 20: CPT | Mod: PBBFAC,PN | Performed by: FAMILY MEDICINE

## 2022-01-31 PROCEDURE — 99490 CHRNC CARE MGMT STAFF 1ST 20: CPT | Mod: S$PBB,,, | Performed by: FAMILY MEDICINE

## 2022-01-31 PROCEDURE — 99490 PR CHRONIC CARE MGMT, 1ST 20 MIN: ICD-10-PCS | Mod: S$PBB,,, | Performed by: FAMILY MEDICINE

## 2022-02-25 ENCOUNTER — TELEPHONE (OUTPATIENT)
Dept: FAMILY MEDICINE | Facility: CLINIC | Age: 87
End: 2022-02-25
Payer: MEDICARE

## 2022-02-28 ENCOUNTER — EXTERNAL CHRONIC CARE MANAGEMENT (OUTPATIENT)
Dept: PRIMARY CARE CLINIC | Facility: CLINIC | Age: 87
End: 2022-02-28
Payer: MEDICARE

## 2022-02-28 PROCEDURE — 99490 PR CHRONIC CARE MGMT, 1ST 20 MIN: ICD-10-PCS | Mod: S$PBB,,, | Performed by: FAMILY MEDICINE

## 2022-02-28 PROCEDURE — 99490 CHRNC CARE MGMT STAFF 1ST 20: CPT | Mod: S$PBB,,, | Performed by: FAMILY MEDICINE

## 2022-02-28 PROCEDURE — 99490 CHRNC CARE MGMT STAFF 1ST 20: CPT | Mod: PBBFAC,PN | Performed by: FAMILY MEDICINE

## 2022-03-14 NOTE — TELEPHONE ENCOUNTER

## 2022-03-14 NOTE — TELEPHONE ENCOUNTER
Care Due:                  Date            Visit Type   Department     Provider  --------------------------------------------------------------------------------                                MYCHART                              FOLLOWUP/OF  [facility]  Last Visit: 05-      FICE VISIT   MEDICINE       Mal Bernstein  Next Visit: None Scheduled  None         None Found                                                            Last  Test          Frequency    Reason                     Performed    Due Date  --------------------------------------------------------------------------------    Office Visit  12 months..  loratadine, pravastatin,   05- 05-                             triamterene-hydrochloroth                             iazide...................    CMP.........  12 months..  pravastatin,               01- 01-                             triamterene-hydrochloroth                             iazide...................    Lipid Panel.  12 months..  pravastatin..............  01- 01-    Powered by [employer]. Reference number: [id number].   3/14/2022 12:15:25 AM CDT

## 2022-03-15 RX ORDER — TRIAMTERENE/HYDROCHLOROTHIAZID 37.5-25 MG
1 TABLET ORAL
Qty: 90 TABLET | Refills: 0 | Status: SHIPPED | OUTPATIENT
Start: 2022-03-16 | End: 2022-06-01

## 2022-05-11 ENCOUNTER — TELEPHONE (OUTPATIENT)
Dept: FAMILY MEDICINE | Facility: CLINIC | Age: 87
End: 2022-05-11
Payer: MEDICARE

## 2022-05-11 DIAGNOSIS — D63.1 ANEMIA IN STAGE 2 CHRONIC KIDNEY DISEASE: ICD-10-CM

## 2022-05-11 DIAGNOSIS — I24.0 ISCHEMIC HEART DISEASE DUE TO CORONARY ARTERY OBSTRUCTION: ICD-10-CM

## 2022-05-11 DIAGNOSIS — I25.9 ISCHEMIC HEART DISEASE DUE TO CORONARY ARTERY OBSTRUCTION: ICD-10-CM

## 2022-05-11 DIAGNOSIS — N18.2 ANEMIA IN STAGE 2 CHRONIC KIDNEY DISEASE: ICD-10-CM

## 2022-05-11 DIAGNOSIS — I48.0 PAROXYSMAL ATRIAL FIBRILLATION: ICD-10-CM

## 2022-05-11 DIAGNOSIS — D51.0 VITAMIN B12 DEFICIENCY ANEMIA DUE TO INTRINSIC FACTOR DEFICIENCY: ICD-10-CM

## 2022-05-11 DIAGNOSIS — D50.0 IRON DEFICIENCY ANEMIA DUE TO CHRONIC BLOOD LOSS: ICD-10-CM

## 2022-05-11 DIAGNOSIS — I10 ESSENTIAL HYPERTENSION: Primary | ICD-10-CM

## 2022-05-11 NOTE — TELEPHONE ENCOUNTER
----- Message from Laney Sanford sent at 5/11/2022  1:06 PM CDT -----  Regarding: orders  Contact: Daughter, virginia  Patient need order for Annual bloodwork, any questions please call back at [phone] (home)

## 2022-05-12 NOTE — TELEPHONE ENCOUNTER
attempted to contact patient. Line was busy. LM on Virginia's  to call office with call back number

## 2022-05-25 ENCOUNTER — LAB VISIT (OUTPATIENT)
Dept: LAB | Facility: HOSPITAL | Age: 87
End: 2022-05-25
Attending: FAMILY MEDICINE
Payer: MEDICARE

## 2022-05-25 DIAGNOSIS — I25.9 ISCHEMIC HEART DISEASE DUE TO CORONARY ARTERY OBSTRUCTION: ICD-10-CM

## 2022-05-25 DIAGNOSIS — D51.0 VITAMIN B12 DEFICIENCY ANEMIA DUE TO INTRINSIC FACTOR DEFICIENCY: ICD-10-CM

## 2022-05-25 DIAGNOSIS — I10 ESSENTIAL HYPERTENSION: ICD-10-CM

## 2022-05-25 DIAGNOSIS — D50.0 IRON DEFICIENCY ANEMIA DUE TO CHRONIC BLOOD LOSS: ICD-10-CM

## 2022-05-25 DIAGNOSIS — N18.2 ANEMIA IN STAGE 2 CHRONIC KIDNEY DISEASE: ICD-10-CM

## 2022-05-25 DIAGNOSIS — D63.1 ANEMIA IN STAGE 2 CHRONIC KIDNEY DISEASE: ICD-10-CM

## 2022-05-25 DIAGNOSIS — I24.0 ISCHEMIC HEART DISEASE DUE TO CORONARY ARTERY OBSTRUCTION: ICD-10-CM

## 2022-05-25 LAB
ALBUMIN SERPL BCP-MCNC: 3.6 G/DL (ref 3.5–5.2)
ALP SERPL-CCNC: 76 U/L (ref 55–135)
ALT SERPL W/O P-5'-P-CCNC: <5 U/L (ref 10–44)
ANION GAP SERPL CALC-SCNC: 9 MMOL/L (ref 8–16)
AST SERPL-CCNC: 12 U/L (ref 10–40)
BASOPHILS # BLD AUTO: 0.02 K/UL (ref 0–0.2)
BASOPHILS NFR BLD: 0.4 % (ref 0–1.9)
BILIRUB SERPL-MCNC: 0.4 MG/DL (ref 0.1–1)
BUN SERPL-MCNC: 16 MG/DL (ref 8–23)
CALCIUM SERPL-MCNC: 9.4 MG/DL (ref 8.7–10.5)
CHLORIDE SERPL-SCNC: 106 MMOL/L (ref 95–110)
CHOLEST SERPL-MCNC: 139 MG/DL (ref 120–199)
CHOLEST/HDLC SERPL: 3.6 {RATIO} (ref 2–5)
CO2 SERPL-SCNC: 25 MMOL/L (ref 23–29)
CREAT SERPL-MCNC: 1.1 MG/DL (ref 0.5–1.4)
DIFFERENTIAL METHOD: ABNORMAL
EOSINOPHIL # BLD AUTO: 0.4 K/UL (ref 0–0.5)
EOSINOPHIL NFR BLD: 7.6 % (ref 0–8)
ERYTHROCYTE [DISTWIDTH] IN BLOOD BY AUTOMATED COUNT: 13.3 % (ref 11.5–14.5)
EST. GFR  (AFRICAN AMERICAN): 53.3 ML/MIN/1.73 M^2
EST. GFR  (NON AFRICAN AMERICAN): 46.2 ML/MIN/1.73 M^2
FERRITIN SERPL-MCNC: 605 NG/ML (ref 20–300)
GLUCOSE SERPL-MCNC: 100 MG/DL (ref 70–110)
HCT VFR BLD AUTO: 31.1 % (ref 37–48.5)
HDLC SERPL-MCNC: 39 MG/DL (ref 40–75)
HDLC SERPL: 28.1 % (ref 20–50)
HGB BLD-MCNC: 10.1 G/DL (ref 12–16)
IMM GRANULOCYTES # BLD AUTO: 0 K/UL (ref 0–0.04)
IMM GRANULOCYTES NFR BLD AUTO: 0 % (ref 0–0.5)
IRON SERPL-MCNC: 39 UG/DL (ref 30–160)
LDLC SERPL CALC-MCNC: 85.2 MG/DL (ref 63–159)
LYMPHOCYTES # BLD AUTO: 1.9 K/UL (ref 1–4.8)
LYMPHOCYTES NFR BLD: 36.3 % (ref 18–48)
MCH RBC QN AUTO: 30.2 PG (ref 27–31)
MCHC RBC AUTO-ENTMCNC: 32.5 G/DL (ref 32–36)
MCV RBC AUTO: 93 FL (ref 82–98)
MONOCYTES # BLD AUTO: 0.5 K/UL (ref 0.3–1)
MONOCYTES NFR BLD: 9.6 % (ref 4–15)
NEUTROPHILS # BLD AUTO: 2.4 K/UL (ref 1.8–7.7)
NEUTROPHILS NFR BLD: 46.1 % (ref 38–73)
NONHDLC SERPL-MCNC: 100 MG/DL
NRBC BLD-RTO: 0 /100 WBC
PLATELET # BLD AUTO: 327 K/UL (ref 150–450)
PMV BLD AUTO: 10.4 FL (ref 9.2–12.9)
POTASSIUM SERPL-SCNC: 4.6 MMOL/L (ref 3.5–5.1)
PROT SERPL-MCNC: 6.7 G/DL (ref 6–8.4)
RBC # BLD AUTO: 3.34 M/UL (ref 4–5.4)
SATURATED IRON: 16 % (ref 20–50)
SODIUM SERPL-SCNC: 140 MMOL/L (ref 136–145)
TOTAL IRON BINDING CAPACITY: 247 UG/DL (ref 250–450)
TRANSFERRIN SERPL-MCNC: 167 MG/DL (ref 200–375)
TRIGL SERPL-MCNC: 74 MG/DL (ref 30–150)
VIT B12 SERPL-MCNC: 651 PG/ML (ref 210–950)
WBC # BLD AUTO: 5.13 K/UL (ref 3.9–12.7)

## 2022-05-25 PROCEDURE — 80061 LIPID PANEL: CPT | Performed by: FAMILY MEDICINE

## 2022-05-25 PROCEDURE — 84466 ASSAY OF TRANSFERRIN: CPT | Performed by: FAMILY MEDICINE

## 2022-05-25 PROCEDURE — 82728 ASSAY OF FERRITIN: CPT | Performed by: FAMILY MEDICINE

## 2022-05-25 PROCEDURE — 36415 COLL VENOUS BLD VENIPUNCTURE: CPT | Mod: PO | Performed by: FAMILY MEDICINE

## 2022-05-25 PROCEDURE — 80053 COMPREHEN METABOLIC PANEL: CPT | Performed by: FAMILY MEDICINE

## 2022-05-25 PROCEDURE — 85025 COMPLETE CBC W/AUTO DIFF WBC: CPT | Performed by: FAMILY MEDICINE

## 2022-05-25 PROCEDURE — 82607 VITAMIN B-12: CPT | Performed by: FAMILY MEDICINE

## 2022-06-01 ENCOUNTER — OFFICE VISIT (OUTPATIENT)
Dept: FAMILY MEDICINE | Facility: CLINIC | Age: 87
End: 2022-06-01
Payer: MEDICARE

## 2022-06-01 VITALS
DIASTOLIC BLOOD PRESSURE: 60 MMHG | BODY MASS INDEX: 19.07 KG/M2 | OXYGEN SATURATION: 95 % | WEIGHT: 101 LBS | HEART RATE: 57 BPM | HEIGHT: 61 IN | SYSTOLIC BLOOD PRESSURE: 110 MMHG

## 2022-06-01 DIAGNOSIS — I24.0 ISCHEMIC HEART DISEASE DUE TO CORONARY ARTERY OBSTRUCTION: ICD-10-CM

## 2022-06-01 DIAGNOSIS — I10 ESSENTIAL HYPERTENSION: Primary | ICD-10-CM

## 2022-06-01 DIAGNOSIS — I48.0 PAROXYSMAL ATRIAL FIBRILLATION: ICD-10-CM

## 2022-06-01 DIAGNOSIS — I25.9 ISCHEMIC HEART DISEASE DUE TO CORONARY ARTERY OBSTRUCTION: ICD-10-CM

## 2022-06-01 DIAGNOSIS — D63.1 ANEMIA IN STAGE 2 CHRONIC KIDNEY DISEASE: ICD-10-CM

## 2022-06-01 DIAGNOSIS — N18.2 ANEMIA IN STAGE 2 CHRONIC KIDNEY DISEASE: ICD-10-CM

## 2022-06-01 DIAGNOSIS — D50.0 IRON DEFICIENCY ANEMIA DUE TO CHRONIC BLOOD LOSS: ICD-10-CM

## 2022-06-01 DIAGNOSIS — M17.0 OSTEOARTHRITIS OF BOTH KNEES, UNSPECIFIED OSTEOARTHRITIS TYPE: ICD-10-CM

## 2022-06-01 PROBLEM — I25.10 CORONARY ARTERY DISEASE: Status: ACTIVE | Noted: 2022-06-01

## 2022-06-01 PROBLEM — I65.29 CAROTID ARTERY STENOSIS: Status: ACTIVE | Noted: 2022-06-01

## 2022-06-01 PROCEDURE — 99999 PR PBB SHADOW E&M-EST. PATIENT-LVL III: ICD-10-PCS | Mod: PBBFAC,,, | Performed by: FAMILY MEDICINE

## 2022-06-01 PROCEDURE — 99214 PR OFFICE/OUTPT VISIT, EST, LEVL IV, 30-39 MIN: ICD-10-PCS | Mod: S$PBB,,, | Performed by: FAMILY MEDICINE

## 2022-06-01 PROCEDURE — 99214 OFFICE O/P EST MOD 30 MIN: CPT | Mod: S$PBB,,, | Performed by: FAMILY MEDICINE

## 2022-06-01 PROCEDURE — 99999 PR PBB SHADOW E&M-EST. PATIENT-LVL III: CPT | Mod: PBBFAC,,, | Performed by: FAMILY MEDICINE

## 2022-06-01 PROCEDURE — 99213 OFFICE O/P EST LOW 20 MIN: CPT | Mod: PBBFAC,PO | Performed by: FAMILY MEDICINE

## 2022-08-31 ENCOUNTER — OFFICE VISIT (OUTPATIENT)
Dept: FAMILY MEDICINE | Facility: CLINIC | Age: 87
End: 2022-08-31
Payer: MEDICARE

## 2022-08-31 ENCOUNTER — HOSPITAL ENCOUNTER (OUTPATIENT)
Dept: RADIOLOGY | Facility: HOSPITAL | Age: 87
Discharge: HOME | End: 2022-08-31
Attending: FAMILY MEDICINE
Payer: MEDICARE

## 2022-08-31 VITALS
OXYGEN SATURATION: 92 % | SYSTOLIC BLOOD PRESSURE: 100 MMHG | HEART RATE: 67 BPM | BODY MASS INDEX: 17.9 KG/M2 | DIASTOLIC BLOOD PRESSURE: 42 MMHG | WEIGHT: 94.81 LBS | HEIGHT: 61 IN

## 2022-08-31 DIAGNOSIS — I10 ESSENTIAL HYPERTENSION: ICD-10-CM

## 2022-08-31 DIAGNOSIS — Z78.0 MENOPAUSE: ICD-10-CM

## 2022-08-31 DIAGNOSIS — R63.6 UNDERWEIGHT DUE TO INADEQUATE CALORIC INTAKE: ICD-10-CM

## 2022-08-31 DIAGNOSIS — I73.9 PERIPHERAL VASCULAR DISEASE: ICD-10-CM

## 2022-08-31 DIAGNOSIS — R63.4 WEIGHT LOSS: Primary | ICD-10-CM

## 2022-08-31 DIAGNOSIS — I25.10 CORONARY ARTERY DISEASE, UNSPECIFIED VESSEL OR LESION TYPE, UNSPECIFIED WHETHER ANGINA PRESENT, UNSPECIFIED WHETHER NATIVE OR TRANSPLANTED HEART: ICD-10-CM

## 2022-08-31 DIAGNOSIS — I25.9 ISCHEMIC HEART DISEASE DUE TO CORONARY ARTERY OBSTRUCTION: ICD-10-CM

## 2022-08-31 DIAGNOSIS — I48.0 PAROXYSMAL ATRIAL FIBRILLATION: ICD-10-CM

## 2022-08-31 DIAGNOSIS — D50.0 IRON DEFICIENCY ANEMIA DUE TO CHRONIC BLOOD LOSS: ICD-10-CM

## 2022-08-31 DIAGNOSIS — I24.0 ISCHEMIC HEART DISEASE DUE TO CORONARY ARTERY OBSTRUCTION: ICD-10-CM

## 2022-08-31 DIAGNOSIS — N18.30 STAGE 3 CHRONIC KIDNEY DISEASE, UNSPECIFIED WHETHER STAGE 3A OR 3B CKD: ICD-10-CM

## 2022-08-31 PROCEDURE — 99214 OFFICE O/P EST MOD 30 MIN: CPT | Mod: S$PBB,,, | Performed by: FAMILY MEDICINE

## 2022-08-31 PROCEDURE — 99999 PR PBB SHADOW E&M-EST. PATIENT-LVL IV: CPT | Mod: PBBFAC,,, | Performed by: FAMILY MEDICINE

## 2022-08-31 PROCEDURE — 77080 DXA BONE DENSITY AXIAL: CPT | Mod: TC,PO

## 2022-08-31 PROCEDURE — 77080 DXA BONE DENSITY AXIAL: CPT | Mod: 26,,, | Performed by: RADIOLOGY

## 2022-08-31 PROCEDURE — 99214 PR OFFICE/OUTPT VISIT, EST, LEVL IV, 30-39 MIN: ICD-10-PCS | Mod: S$PBB,,, | Performed by: FAMILY MEDICINE

## 2022-08-31 PROCEDURE — 77080 DEXA BONE DENSITY SPINE HIP: ICD-10-PCS | Mod: 26,,, | Performed by: RADIOLOGY

## 2022-08-31 PROCEDURE — 99214 OFFICE O/P EST MOD 30 MIN: CPT | Mod: PBBFAC,25,PO | Performed by: FAMILY MEDICINE

## 2022-08-31 PROCEDURE — 99999 PR PBB SHADOW E&M-EST. PATIENT-LVL IV: ICD-10-PCS | Mod: PBBFAC,,, | Performed by: FAMILY MEDICINE

## 2022-08-31 NOTE — PROGRESS NOTES
"Subjective:       Patient ID: Sobeida Mcclain is a 87 y.o. female.    Chief Complaint: Follow-up (3 month )    Here today to follow up on chronic medical conditions.   At her last visit, she was c/o weight loss.  We discussed calorie supplements.  She has had difficulty with the Ensure as it causes diarrhea.  She has lost 6 lbs since then.    HTN:  She is on Lopressor 25 mg daily.  CAD/A. Fib/PVD:  She is on Beta blocker and Statin (Pravastatin 20 mg), but she stopped the Statin as it "makes her crazy".  She is seeing Cardiology ([facility]).  She is having a stress test soon.  Vascular calcification noted on CT from 2016  Anemia: she is iron def.  We restarted her iron supplement last visit with Slow Fe.  She stopped it as she can't swallow the iron pills.  CKD: lab work in May shows decreased eGFR consistent with chronic kidney disease.    Review of Systems   Constitutional:  Positive for unexpected weight change. Negative for activity change, appetite change, fatigue and fever.   Respiratory:  Negative for cough, shortness of breath and wheezing.    Cardiovascular:  Negative for chest pain and palpitations.   Gastrointestinal:  Negative for abdominal pain, constipation, diarrhea and nausea.   Skin:  Negative for pallor and rash.   Neurological:  Negative for dizziness, syncope, light-headedness and headaches.   Psychiatric/Behavioral:  The patient is not nervous/anxious.      Objective:      Vitals:    08/31/22 1012   BP: (!) 100/42   Pulse: 67   SpO2: (!) 92%   Weight: 43 kg (94 lb 12.8 oz)   Height: 5' 1" (1.549 m)      Physical Exam  Constitutional:       General: She is not in acute distress.     Appearance: She is underweight.   Cardiovascular:      Rate and Rhythm: Normal rate and regular rhythm.   Pulmonary:      Effort: Pulmonary effort is normal. No respiratory distress.      Breath sounds: Normal breath sounds.   Abdominal:      General: Abdomen is flat. There is no distension.      Palpations: Abdomen is " soft.   Skin:     General: Skin is warm and dry.   Neurological:      General: No focal deficit present.      Mental Status: She is alert.       Results for orders placed or performed in visit on 05/25/22   CBC Auto Differential   Result Value Ref Range    WBC 5.13 3.90 - 12.70 K/uL    RBC 3.34 (L) 4.00 - 5.40 M/uL    Hemoglobin 10.1 (L) 12.0 - 16.0 g/dL    Hematocrit 31.1 (L) 37.0 - 48.5 %    MCV 93 82 - 98 fL    MCH 30.2 27.0 - 31.0 pg    MCHC 32.5 32.0 - 36.0 g/dL    RDW 13.3 11.5 - 14.5 %    Platelets 327 150 - 450 K/uL    MPV 10.4 9.2 - 12.9 fL    Immature Granulocytes 0.0 0.0 - 0.5 %    Gran # (ANC) 2.4 1.8 - 7.7 K/uL    Immature Grans (Abs) 0.00 0.00 - 0.04 K/uL    Lymph # 1.9 1.0 - 4.8 K/uL    Mono # 0.5 0.3 - 1.0 K/uL    Eos # 0.4 0.0 - 0.5 K/uL    Baso # 0.02 0.00 - 0.20 K/uL    nRBC 0 0 /100 WBC    Gran % 46.1 38.0 - 73.0 %    Lymph % 36.3 18.0 - 48.0 %    Mono % 9.6 4.0 - 15.0 %    Eosinophil % 7.6 0.0 - 8.0 %    Basophil % 0.4 0.0 - 1.9 %    Differential Method Automated    Comprehensive Metabolic Panel   Result Value Ref Range    Sodium 140 136 - 145 mmol/L    Potassium 4.6 3.5 - 5.1 mmol/L    Chloride 106 95 - 110 mmol/L    CO2 25 23 - 29 mmol/L    Glucose 100 70 - 110 mg/dL    BUN 16 8 - 23 mg/dL    Creatinine 1.1 0.5 - 1.4 mg/dL    Calcium 9.4 8.7 - 10.5 mg/dL    Total Protein 6.7 6.0 - 8.4 g/dL    Albumin 3.6 3.5 - 5.2 g/dL    Total Bilirubin 0.4 0.1 - 1.0 mg/dL    Alkaline Phosphatase 76 55 - 135 U/L    AST 12 10 - 40 U/L    ALT <5 (L) 10 - 44 U/L    Anion Gap 9 8 - 16 mmol/L    eGFR if African American 53.3 (A) >60 mL/min/1.73 m^2    eGFR if non  46.2 (A) >60 mL/min/1.73 m^2   Lipid Panel   Result Value Ref Range    Cholesterol 139 120 - 199 mg/dL    Triglycerides 74 30 - 150 mg/dL    HDL 39 (L) 40 - 75 mg/dL    LDL Cholesterol 85.2 63.0 - 159.0 mg/dL    HDL/Cholesterol Ratio 28.1 20.0 - 50.0 %    Total Cholesterol/HDL Ratio 3.6 2.0 - 5.0    Non-HDL Cholesterol 100 mg/dL   Iron  and TIBC   Result Value Ref Range    Iron 39 30 - 160 ug/dL    Transferrin 167 (L) 200 - 375 mg/dL    TIBC 247 (L) 250 - 450 ug/dL    Saturated Iron 16 (L) 20 - 50 %   Vitamin B12   Result Value Ref Range    Vitamin B-12 651 210 - 950 pg/mL   Ferritin   Result Value Ref Range    Ferritin 605 (H) 20.0 - 300.0 ng/mL      Assessment:       1. Weight loss    2. Underweight due to inadequate caloric intake    3. Essential hypertension    4. Paroxysmal atrial fibrillation    5. 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 Iron deficiency anemia due to chronic blood loss    7. Coronary artery disease, unspecified vessel or lesion type, unspecified whether angina present, unspecified whether native or transplanted heart    8. Stage 3 chronic kidney disease, unspecified whether stage 3a or 3b CKD    9. Peripheral vascular disease        Plan:       Weight loss  -     TSH; Future; Expected date: 08/31/2022  -     Comprehensive Metabolic Panel; Future; Expected date: 08/31/2022  Check thyroid.  Continue to try further calorie supplementation.  Had initial conversation about end of life care/Hospice.  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  May be candidate for iron infusion  Coronary artery disease, unspecified vessel or lesion type, unspecified whether angina present, unspecified whether native or transplanted heart    Stage 3 chronic kidney disease, unspecified whether stage 3a or 3b CKD    Peripheral vascular disease        Medication List with Changes/Refills   Current Medications     ASPIRIN (ECOTRIN) 81 MG EC TABLET    Take 81 mg by mouth once daily.    CYANOCOBALAMIN (VITAMIN B-12) 1000 MCG TABLET    Take 500 mcg by mouth once daily.     FERROUS SULFATE, DRIED (SLOW FE) 160 MG (50 MG IRON) TBSR    Take 1 tablet (160 mg total) by mouth once daily.    METOPROLOL TARTRATE (LOPRESSOR) 25 MG TABLET    TAKE 1 TABLET BY MOUTH TWICE A DAY    PRAVASTATIN (PRAVACHOL) 20 MG TABLET    TAKE 1 TABLET BY MOUTH EVERY DAY

## 2022-09-01 ENCOUNTER — TELEPHONE (OUTPATIENT)
Dept: HEMATOLOGY/ONCOLOGY | Facility: CLINIC | Age: 87
End: 2022-09-01
Payer: MEDICARE

## 2022-09-01 NOTE — TELEPHONE ENCOUNTER
Called pt's daughter, Virginia, care giver.   Discussed hem referral.   She stated she is busy at work at this time and will have her other sisiter bring mother to her appt's, which can only be a thurs or Friday.   Scheduled pt with [facility] with Dr Peña on 9/16, confirmed appt date time and location.   She did say she may have to call back to rs at another date and time after confirming with her sister.

## 2022-09-16 ENCOUNTER — LAB VISIT (OUTPATIENT)
Dept: LAB | Facility: HOSPITAL | Age: 87
End: 2022-09-16
Attending: INTERNAL MEDICINE
Payer: MEDICARE

## 2022-09-16 ENCOUNTER — OFFICE VISIT (OUTPATIENT)
Dept: HEMATOLOGY/ONCOLOGY | Facility: CLINIC | Age: 87
End: 2022-09-16
Payer: MEDICARE

## 2022-09-16 VITALS
DIASTOLIC BLOOD PRESSURE: 50 MMHG | SYSTOLIC BLOOD PRESSURE: 148 MMHG | HEIGHT: 61 IN | BODY MASS INDEX: 18.31 KG/M2 | WEIGHT: 97 LBS | HEART RATE: 58 BPM | TEMPERATURE: 97 F | RESPIRATION RATE: 18 BRPM | OXYGEN SATURATION: 94 %

## 2022-09-16 DIAGNOSIS — N18.2 ANEMIA IN STAGE 2 CHRONIC KIDNEY DISEASE: ICD-10-CM

## 2022-09-16 DIAGNOSIS — R63.6 UNDERWEIGHT DUE TO INADEQUATE CALORIC INTAKE: ICD-10-CM

## 2022-09-16 DIAGNOSIS — D63.1 ANEMIA IN STAGE 2 CHRONIC KIDNEY DISEASE: ICD-10-CM

## 2022-09-16 DIAGNOSIS — E03.9 HYPOTHYROIDISM, UNSPECIFIED TYPE: Primary | ICD-10-CM

## 2022-09-16 DIAGNOSIS — D50.0 IRON DEFICIENCY ANEMIA DUE TO CHRONIC BLOOD LOSS: Primary | ICD-10-CM

## 2022-09-16 DIAGNOSIS — R06.00 DYSPNEA, UNSPECIFIED: ICD-10-CM

## 2022-09-16 DIAGNOSIS — D50.0 IRON DEFICIENCY ANEMIA DUE TO CHRONIC BLOOD LOSS: ICD-10-CM

## 2022-09-16 LAB
BACTERIA #/AREA URNS HPF: ABNORMAL /HPF
BILIRUB UR QL STRIP: NEGATIVE
CLARITY UR: CLEAR
COLOR UR: YELLOW
DIRECT COOMBS (POLY/IGG): NORMAL
GLUCOSE UR QL STRIP: NEGATIVE
HGB UR QL STRIP: ABNORMAL
KETONES UR QL STRIP: NEGATIVE
LDH SERPL L TO P-CCNC: 165 U/L (ref 110–260)
LEUKOCYTE ESTERASE UR QL STRIP: ABNORMAL
MICROSCOPIC COMMENT: ABNORMAL
NITRITE UR QL STRIP: NEGATIVE
PH UR STRIP: 6 [PH] (ref 5–8)
PROT UR QL STRIP: NEGATIVE
RBC #/AREA URNS HPF: 1 /HPF (ref 0–4)
RETICS/RBC NFR AUTO: 0.9 % (ref 0.5–2.5)
SP GR UR STRIP: 1.01 (ref 1–1.03)
SQUAMOUS #/AREA URNS HPF: 2 /HPF
T4 FREE SERPL-MCNC: 0.87 NG/DL (ref 0.71–1.51)
URN SPEC COLLECT METH UR: ABNORMAL
WBC #/AREA URNS HPF: 19 /HPF (ref 0–5)
WBC CLUMPS URNS QL MICRO: ABNORMAL

## 2022-09-16 PROCEDURE — 99999 PR PBB SHADOW E&M-EST. PATIENT-LVL III: ICD-10-PCS | Mod: PBBFAC,,, | Performed by: INTERNAL MEDICINE

## 2022-09-16 PROCEDURE — 85045 AUTOMATED RETICULOCYTE COUNT: CPT | Mod: PN | Performed by: INTERNAL MEDICINE

## 2022-09-16 PROCEDURE — 99214 OFFICE O/P EST MOD 30 MIN: CPT | Mod: S$PBB,,, | Performed by: INTERNAL MEDICINE

## 2022-09-16 PROCEDURE — 81000 URINALYSIS NONAUTO W/SCOPE: CPT | Mod: PN | Performed by: INTERNAL MEDICINE

## 2022-09-16 PROCEDURE — 86880 COOMBS TEST DIRECT: CPT | Mod: PN | Performed by: INTERNAL MEDICINE

## 2022-09-16 PROCEDURE — 84439 ASSAY OF FREE THYROXINE: CPT | Performed by: INTERNAL MEDICINE

## 2022-09-16 PROCEDURE — 84165 PROTEIN E-PHORESIS SERUM: CPT | Mod: 26,,, | Performed by: PATHOLOGY

## 2022-09-16 PROCEDURE — 99999 PR PBB SHADOW E&M-EST. PATIENT-LVL III: CPT | Mod: PBBFAC,,, | Performed by: INTERNAL MEDICINE

## 2022-09-16 PROCEDURE — 84165 PATHOLOGIST INTERPRETATION SPE: ICD-10-PCS | Mod: 26,,, | Performed by: PATHOLOGY

## 2022-09-16 PROCEDURE — 86880 COOMBS TEST DIRECT: CPT | Performed by: INTERNAL MEDICINE

## 2022-09-16 PROCEDURE — 84165 PROTEIN E-PHORESIS SERUM: CPT | Performed by: INTERNAL MEDICINE

## 2022-09-16 PROCEDURE — 99214 PR OFFICE/OUTPT VISIT, EST, LEVL IV, 30-39 MIN: ICD-10-PCS | Mod: S$PBB,,, | Performed by: INTERNAL MEDICINE

## 2022-09-16 PROCEDURE — 83615 LACTATE (LD) (LDH) ENZYME: CPT | Mod: PN | Performed by: INTERNAL MEDICINE

## 2022-09-16 PROCEDURE — 99213 OFFICE O/P EST LOW 20 MIN: CPT | Mod: PBBFAC,PN | Performed by: INTERNAL MEDICINE

## 2022-09-16 NOTE — PROGRESS NOTES
Subjective:       Patient ID: Sobeida Mcclain is a 87 y.o. female.    Chief Complaint: No chief complaint on file.    HPI  Mrs. Mcclain is an 87-year-old female referred for evaluation for iron deficiency anemia.  The most recent CBC is from 2022 and showed a white count of 5,800 per cubic mm, hemoglobin 10.6 grams/deciliter hematocrit 32.2%, MCV 93, and platelets 289,000 per cubic mm.  On 2021 her WBCs were 5,700 per cubic mm, hemoglobin 11 grams/deciliter, hematocrit 34.5%, MCV 97, and platelets 252,000 per cubic mm.  On 2020 her WBCs were 8,500 per cubic mm, hemoglobin 11.5 grams/deciliter, hematocrit 33.5%, MCV 92, and platelets 348,000 per cubic mm.  On 2019 his white count was 6,600 per cubic mm, hemoglobin 11.8 grams/deciliter, hematocrit 35.9%, MCV 93 and platelets 285,000 per cubic mm.  She is referred for evaluation.  She states that she has been anemic all her life.  In the past she has been treated with oral iron which she tolerated poorly and has been off of it for a few months.  Other recent pertinent labs include a TSH of 1.5uIU/ml, in late August, a normal B12 at 651 pg/mL in late May and a ferritin of 605 ng/mL in late May.      PAST MEDICAL HISTORY:  As above.  She has a history of coronary artery disease status post stent placement, peripheral vascular disease, and hypertension, hyperlipidemia.  She also has a history of osteoporosis.  PAST SURGICAL HISTORY:  Significant for tonsillectomy, , and carotid endarterectomy.  SOCIAL HISTORY:  She is .  She is living with a daughter and a son.  She used to be a housewife.  She does not smoke and does not drink alcohol.  FAMILY HISTORY:  Negative for cancer  GYN HISTORY:  She has 4 children    Review of Systems    Overall she feels well.  Other than mild fatigue with exertion she has no additional complaints.  ECOG PS is 1.  She denies any anxiety, depression, easy bruising, fevers, chills, night   sweats, weight loss, nausea, vomiting, diarrhea, constipation, diplopia, blurred vision, headache, chest pain, palpitations, shortness of breath, breast pain, abdominal pain, extremity pain, or difficulty ambulating.  The remainder of the ten-point ROS, including general, skin, lymph, H/N, cardiorespiratory, GI, , Neuro, Endocrine, and psychiatric is negative.    Objective:      Physical Exam      She is alert, oriented to time, place, person, pleasant, well      nourished, in no acute physical distress.   She is accompanied by her daughter.                                 VITAL SIGNS:  Reviewed                                      HEENT:  Normal.  There are no nasal, oral, lip, gingival, auricular, lid,    or conjunctival lesions.  Mucosae are moist and pink, and there is no        thrush.  Pupils are equal, reactive to light and accommodation.              Extraocular muscle movements are intact.  She is edentulous.  There is no frontal or maxillary tenderness.                                     NECK:  Supple without JVD, adenopathy, or thyromegaly.                       LUNGS:  Clear to auscultation without wheezing, rales, or rhonchi.           CARDIOVASCULAR:  Reveals an S1, S2, no murmurs, no rubs, no gallops.         ABDOMEN:  Soft, nontender, without organomegaly.  Bowel sounds are    present.                                                                     EXTREMITIES:  No cyanosis, clubbing, or edema.                               BREASTS:  No masses are palpated.  She has a loop recorder in place.                                    LYMPHATIC:  There is no cervical, axillary, or supraclavicular adenopathy.   SKIN:  Warm and moist, without petechiae, rashes, induration, or ecchymoses.           NEUROLOGIC:  DTRs are 0-1+ bilaterally, symmetrical, motor function is 5/5,  and cranial nerves are  within normal limits.   Assessment:       Problem List Items Addressed This Visit       Anemia, normochromic  normocytic, longstanding, mild       2. Coronary artery disease and peripheral vascular disease  Plan:         I had a long discussion with her and her daughter; her anemia is normochromic normocytic and it is longstanding.  We went over the differential.  At this point we will proceed as follows:  She will have a repeat CBC today  We will check an SPEP, LDH, and direct Hardik  We will check a reticulocyte count  We will check a T4 level  We will check a urinalysis  She was submit 3 stool samples for occult blood testing  I will see her again in 1-2 weeks to discuss the results.    Her multiple questions were answered to her satisfaction.

## 2022-09-16 NOTE — Clinical Note
Hepatitis B vaccine administered, PKU done, serum bilirubin drawn and sent to lab by PCT. Infant swaddled, no distress noted. Parents encouraged to call for needs or concern. Labs in urinalysis today.  See me in 2 weeks

## 2022-09-19 ENCOUNTER — LAB VISIT (OUTPATIENT)
Dept: LAB | Facility: HOSPITAL | Age: 87
End: 2022-09-19
Attending: INTERNAL MEDICINE
Payer: MEDICARE

## 2022-09-19 DIAGNOSIS — D50.0 IRON DEFICIENCY ANEMIA DUE TO CHRONIC BLOOD LOSS: ICD-10-CM

## 2022-09-19 DIAGNOSIS — E03.9 HYPOTHYROIDISM, UNSPECIFIED TYPE: ICD-10-CM

## 2022-09-19 LAB
ALBUMIN SERPL ELPH-MCNC: 3.89 G/DL (ref 3.35–5.55)
ALPHA1 GLOB SERPL ELPH-MCNC: 0.35 G/DL (ref 0.17–0.41)
ALPHA2 GLOB SERPL ELPH-MCNC: 0.92 G/DL (ref 0.43–0.99)
B-GLOBULIN SERPL ELPH-MCNC: 0.78 G/DL (ref 0.5–1.1)
GAMMA GLOB SERPL ELPH-MCNC: 1.25 G/DL (ref 0.67–1.58)
OB PNL STL: NEGATIVE
OB PNL STL: NEGATIVE
OB PNL STL: POSITIVE
PROT SERPL-MCNC: 7.2 G/DL (ref 6–8.4)

## 2022-09-19 PROCEDURE — 82272 OCCULT BLD FECES 1-3 TESTS: CPT | Mod: PN | Performed by: INTERNAL MEDICINE

## 2022-09-20 LAB — PATHOLOGIST INTERPRETATION SPE: NORMAL

## 2022-09-29 ENCOUNTER — LAB VISIT (OUTPATIENT)
Dept: LAB | Facility: HOSPITAL | Age: 87
End: 2022-09-29
Attending: INTERNAL MEDICINE
Payer: MEDICARE

## 2022-09-29 ENCOUNTER — OFFICE VISIT (OUTPATIENT)
Dept: HEMATOLOGY/ONCOLOGY | Facility: CLINIC | Age: 87
End: 2022-09-29
Payer: MEDICARE

## 2022-09-29 VITALS
DIASTOLIC BLOOD PRESSURE: 50 MMHG | BODY MASS INDEX: 18.53 KG/M2 | HEIGHT: 61 IN | WEIGHT: 98.13 LBS | SYSTOLIC BLOOD PRESSURE: 111 MMHG | TEMPERATURE: 98 F | OXYGEN SATURATION: 90 % | HEART RATE: 60 BPM | RESPIRATION RATE: 16 BRPM

## 2022-09-29 DIAGNOSIS — D50.0 ANEMIA DUE TO CHRONIC BLOOD LOSS: Primary | ICD-10-CM

## 2022-09-29 DIAGNOSIS — D50.0 ANEMIA DUE TO CHRONIC BLOOD LOSS: ICD-10-CM

## 2022-09-29 LAB
BASOPHILS # BLD AUTO: 0.03 K/UL (ref 0–0.2)
BASOPHILS NFR BLD: 0.5 % (ref 0–1.9)
DIFFERENTIAL METHOD: ABNORMAL
EOSINOPHIL # BLD AUTO: 0.4 K/UL (ref 0–0.5)
EOSINOPHIL NFR BLD: 6.4 % (ref 0–8)
ERYTHROCYTE [DISTWIDTH] IN BLOOD BY AUTOMATED COUNT: 13.4 % (ref 11.5–14.5)
HCT VFR BLD AUTO: 32.9 % (ref 37–48.5)
HGB BLD-MCNC: 10.9 G/DL (ref 12–16)
IMM GRANULOCYTES # BLD AUTO: 0.01 K/UL (ref 0–0.04)
IMM GRANULOCYTES NFR BLD AUTO: 0.2 % (ref 0–0.5)
LYMPHOCYTES # BLD AUTO: 1.8 K/UL (ref 1–4.8)
LYMPHOCYTES NFR BLD: 30.2 % (ref 18–48)
MCH RBC QN AUTO: 31.3 PG (ref 27–31)
MCHC RBC AUTO-ENTMCNC: 33.1 G/DL (ref 32–36)
MCV RBC AUTO: 95 FL (ref 82–98)
MONOCYTES # BLD AUTO: 0.6 K/UL (ref 0.3–1)
MONOCYTES NFR BLD: 9.5 % (ref 4–15)
NEUTROPHILS # BLD AUTO: 3.2 K/UL (ref 1.8–7.7)
NEUTROPHILS NFR BLD: 53.2 % (ref 38–73)
NRBC BLD-RTO: 0 /100 WBC
PLATELET # BLD AUTO: 258 K/UL (ref 150–450)
PMV BLD AUTO: 9.7 FL (ref 9.2–12.9)
RBC # BLD AUTO: 3.48 M/UL (ref 4–5.4)
WBC # BLD AUTO: 5.97 K/UL (ref 3.9–12.7)

## 2022-09-29 PROCEDURE — 99213 OFFICE O/P EST LOW 20 MIN: CPT | Mod: PBBFAC,PN | Performed by: INTERNAL MEDICINE

## 2022-09-29 PROCEDURE — 36415 COLL VENOUS BLD VENIPUNCTURE: CPT | Mod: PN | Performed by: INTERNAL MEDICINE

## 2022-09-29 PROCEDURE — 85025 COMPLETE CBC W/AUTO DIFF WBC: CPT | Mod: PN | Performed by: INTERNAL MEDICINE

## 2022-09-29 PROCEDURE — 99999 PR PBB SHADOW E&M-EST. PATIENT-LVL III: CPT | Mod: PBBFAC,,, | Performed by: INTERNAL MEDICINE

## 2022-09-29 PROCEDURE — 99214 OFFICE O/P EST MOD 30 MIN: CPT | Mod: S$PBB,,, | Performed by: INTERNAL MEDICINE

## 2022-09-29 PROCEDURE — 99214 PR OFFICE/OUTPT VISIT, EST, LEVL IV, 30-39 MIN: ICD-10-PCS | Mod: S$PBB,,, | Performed by: INTERNAL MEDICINE

## 2022-09-29 PROCEDURE — 99999 PR PBB SHADOW E&M-EST. PATIENT-LVL III: ICD-10-PCS | Mod: PBBFAC,,, | Performed by: INTERNAL MEDICINE

## 2022-09-29 NOTE — Clinical Note
Repeat CBC today.  See me in 8 weeks with a repeat CBC and a repeat ferritin.  Labs to be drawn at least 1 day prior

## 2022-09-29 NOTE — PROGRESS NOTES
Subjective:       Patient ID: Sobeida Mcclain is a 87 y.o. female.    Chief Complaint: No chief complaint on file.    HPI  Mrs. Mcclain returns today for follow-up.  I had seen her initially 2 weeks ago    Briefly, she is an 87-year-old female referred for evaluation for iron deficiency anemia.  The most recent CBC is from August 31, 2022 and showed a white count of 5,800 per cubic mm, hemoglobin 10.6 grams/deciliter hematocrit 32.2%, MCV 93, and platelets 289,000 per cubic mm.  On January 25, 2021 her WBCs were 5,700 per cubic mm, hemoglobin 11 grams/deciliter, hematocrit 34.5%, MCV 97, and platelets 252,000 per cubic mm.  On July 22, 2020 her WBCs were 8,500 per cubic mm, hemoglobin 11.5 grams/deciliter, hematocrit 33.5%, MCV 92, and platelets 348,000 per cubic mm.  On October 31, 2019 his white count was 6,600 per cubic mm, hemoglobin 11.8 grams/deciliter, hematocrit 35.9%, MCV 93 and platelets 285,000 per cubic mm.  She is referred for evaluation.  She states that she has been anemic all her life.  In the past she has been treated with oral iron which she tolerated poorly and has been off of it for a few months.  Other recent pertinent labs include a TSH of 1.5uIU/ml, in late August, a normal B12 at 651 pg/mL in late May and a ferritin of 605 ng/mL in late May.  Since her last visit she submitted 3 stool samples 1 of which was positive for occult blood.  Urinalysis showed 1+ hematuria, which appears to be chronic  SPEP was normal  Creatinine was 1.2 mg/dL    Review of Systems    Overall she feels well.  Other than mild fatigue with exertion she has no additional complaints.  ECOG PS is 1.  She denies any anxiety, depression, easy bruising, fevers, chills, night  sweats, weight loss, nausea, vomiting, diarrhea, constipation, diplopia, blurred vision, headache, chest pain, palpitations, shortness of breath, breast pain, abdominal pain, extremity pain, or difficulty ambulating.  The remainder of the ten-point ROS,  including general, skin, lymph, H/N, cardiorespiratory, GI, , Neuro, Endocrine, and psychiatric is negative.    Objective:      Physical Exam      She is alert, oriented to time, place, person, pleasant, well      nourished, in no acute physical distress.   She is accompanied by her daughter.                                 VITAL SIGNS:  Reviewed                                      HEENT:  Normal.  There are no nasal, oral, lip, gingival, auricular, lid,    or conjunctival lesions.  Mucosae are moist and pink, and there is no        thrush.  Pupils are equal, reactive to light and accommodation.              Extraocular muscle movements are intact.  She is edentulous.  There is no frontal or maxillary tenderness.                                     NECK:  Supple without JVD, adenopathy, or thyromegaly.                       LUNGS:  Clear to auscultation without wheezing, rales, or rhonchi.           CARDIOVASCULAR:  Reveals an S1, S2, no murmurs, no rubs, no gallops.         ABDOMEN:  Soft, nontender, without organomegaly.  Bowel sounds are    present.                                                                     EXTREMITIES:  No cyanosis, clubbing, or edema.                               BREASTS:    She has a loop recorder in place.                                    LYMPHATIC:  There is no cervical, axillary, or supraclavicular adenopathy.   SKIN:  Warm and moist, without petechiae, rashes, induration, or ecchymoses.           NEUROLOGIC:  DTRs are 0-1+ bilaterally, symmetrical, motor function is 5/5,  and cranial nerves are  within normal limits.   Assessment:       Problem List Items Addressed This Visit       Anemia, normochromic normocytic, longstanding, mild, now with evidence of GI bleed       2. Coronary artery disease and peripheral vascular disease  Plan:         I had a long discussion with her and her daughter; her anemia is normochromic normocytic and it is longstanding.  However, she has  evidence of GI blood loss.      We went over the differential.  At this point we will proceed as follows:  She will have a repeat CBC today  Will defer to her primary care physician the GI workup.  I have emailed Dr. Colvin to let him know  I will see her again in 8 weeks for follow-up with another CBC and a repeat urinalysis.    Her multiple questions were answered to her satisfaction.

## 2022-10-03 DIAGNOSIS — Z71.89 COMPLEX CARE COORDINATION: ICD-10-CM

## 2022-10-06 ENCOUNTER — TELEPHONE (OUTPATIENT)
Dept: HEMATOLOGY/ONCOLOGY | Facility: CLINIC | Age: 87
End: 2022-10-06
Payer: MEDICARE

## 2022-10-06 NOTE — TELEPHONE ENCOUNTER
----- Message from Alton Cervantes MD sent at 10/6/2022  2:51 PM CDT -----  Contact: Patient daughter  Her anemia is stable...  ----- Message -----  From: Danica Schultz RN  Sent: 10/6/2022   1:55 PM CDT  To: Alton Cervantes MD    I only saw a cbc. Is there something I can tell them for you  danica  ----- Message -----  From: Milagros Wilhelm  Sent: 10/6/2022  11:51 AM CDT  To: Helen Dang Staff    Type:  Patient Call          Who Called:Patient daughter         Does the patient know what this is regarding?: requesting a call back about the results form the lab and to find out the next step ;she said none called with the results ;please advise           Would the patient rather a call back or a response via [facility]? Call           Best Call Back Number:[phone]             Additional Information:

## 2022-10-06 NOTE — TELEPHONE ENCOUNTER
Spoke to daughter, relayed Dr Peña's note that her mom's anemia is stable at this time and to keep her next appointment. Daughter verbalized understanding and thanked nurse.

## 2022-10-21 ENCOUNTER — OFFICE VISIT (OUTPATIENT)
Dept: FAMILY MEDICINE | Facility: CLINIC | Age: 87
End: 2022-10-21
Payer: MEDICARE

## 2022-10-21 VITALS
BODY MASS INDEX: 18.53 KG/M2 | RESPIRATION RATE: 18 BRPM | SYSTOLIC BLOOD PRESSURE: 116 MMHG | WEIGHT: 98.13 LBS | DIASTOLIC BLOOD PRESSURE: 58 MMHG | HEART RATE: 62 BPM | OXYGEN SATURATION: 96 % | HEIGHT: 61 IN

## 2022-10-21 DIAGNOSIS — R10.31 RLQ ABDOMINAL PAIN: Primary | ICD-10-CM

## 2022-10-21 PROCEDURE — 99214 OFFICE O/P EST MOD 30 MIN: CPT | Mod: S$PBB,,, | Performed by: FAMILY MEDICINE

## 2022-10-21 PROCEDURE — 99213 OFFICE O/P EST LOW 20 MIN: CPT | Mod: PBBFAC,PO | Performed by: FAMILY MEDICINE

## 2022-10-21 PROCEDURE — 99999 PR PBB SHADOW E&M-EST. PATIENT-LVL III: CPT | Mod: PBBFAC,,, | Performed by: FAMILY MEDICINE

## 2022-10-21 PROCEDURE — 99214 PR OFFICE/OUTPT VISIT, EST, LEVL IV, 30-39 MIN: ICD-10-PCS | Mod: S$PBB,,, | Performed by: FAMILY MEDICINE

## 2022-10-21 PROCEDURE — 99999 PR PBB SHADOW E&M-EST. PATIENT-LVL III: ICD-10-PCS | Mod: PBBFAC,,, | Performed by: FAMILY MEDICINE

## 2022-10-21 NOTE — PROGRESS NOTES
"Subjective:       Patient ID: Sobeida Mcclain is a 87 y.o. female.    Chief Complaint: Abdominal Pain (RLQ pain, and headache)    HPI:  The patient woke up this morning with right lower quadrant abdominal pain which seems to be getting slightly worse over the day.  She is not having any vomiting.  Denies any chills.  When you press on her abdomen it really hurts on the right lower quadrant.  According to her daughters she has not had an appendectomy.  She has lost her appetite.    Review of Systems   Constitutional: Negative.    HENT: Negative.     Eyes: Negative.    Respiratory: Negative.     Cardiovascular: Negative.    Gastrointestinal:  Positive for abdominal pain.   Endocrine: Negative.    Genitourinary: Negative.    Musculoskeletal: Negative.    Skin: Negative.    Allergic/Immunologic: Negative.    Neurological: Negative.    Hematological: Negative.    Psychiatric/Behavioral: Negative.       Objective:      Vitals:    10/21/22 1548   BP: (!) 116/58   Pulse: 62   Resp: 18   SpO2: 96%   Weight: 44.5 kg (98 lb 1.7 oz)   Height: 5' 1" (1.549 m)      Physical Exam  Vitals and nursing note reviewed.   Constitutional:       Appearance: Normal appearance. She is normal weight.   HENT:      Head: Normocephalic and atraumatic.      Nose: Nose normal.      Mouth/Throat:      Mouth: Mucous membranes are moist.      Pharynx: Oropharynx is clear.   Eyes:      Extraocular Movements: Extraocular movements intact.      Conjunctiva/sclera: Conjunctivae normal.      Pupils: Pupils are equal, round, and reactive to light.   Cardiovascular:      Rate and Rhythm: Normal rate and regular rhythm.      Pulses: Normal pulses.      Heart sounds: Normal heart sounds.   Pulmonary:      Effort: Pulmonary effort is normal.      Breath sounds: Normal breath sounds.   Abdominal:      General: Abdomen is flat. Bowel sounds are normal.      Palpations: Abdomen is soft.      Tenderness: There is abdominal tenderness. There is guarding and rebound. "      Comments: Right lower quadrant abdominal pain with rebound and guarding.   Musculoskeletal:         General: Normal range of motion.      Cervical back: Normal range of motion and neck supple.   Skin:     General: Skin is warm and dry.      Capillary Refill: Capillary refill takes less than 2 seconds.   Neurological:      General: No focal deficit present.      Mental Status: She is alert and oriented to person, place, and time. Mental status is at baseline.   Psychiatric:         Mood and Affect: Mood normal.         Behavior: Behavior normal.         Thought Content: Thought content normal.         Judgment: Judgment normal.       Results for orders placed or performed in visit on 09/29/22   CBC Auto Differential   Result Value Ref Range    WBC 5.97 3.90 - 12.70 K/uL    RBC 3.48 (L) 4.00 - 5.40 M/uL    Hemoglobin 10.9 (L) 12.0 - 16.0 g/dL    Hematocrit 32.9 (L) 37.0 - 48.5 %    MCV 95 82 - 98 fL    MCH 31.3 (H) 27.0 - 31.0 pg    MCHC 33.1 32.0 - 36.0 g/dL    RDW 13.4 11.5 - 14.5 %    Platelets 258 150 - 450 K/uL    MPV 9.7 9.2 - 12.9 fL    Immature Granulocytes 0.2 0.0 - 0.5 %    Gran # (ANC) 3.2 1.8 - 7.7 K/uL    Immature Grans (Abs) 0.01 0.00 - 0.04 K/uL    Lymph # 1.8 1.0 - 4.8 K/uL    Mono # 0.6 0.3 - 1.0 K/uL    Eos # 0.4 0.0 - 0.5 K/uL    Baso # 0.03 0.00 - 0.20 K/uL    nRBC 0 0 /100 WBC    Gran % 53.2 38.0 - 73.0 %    Lymph % 30.2 18.0 - 48.0 %    Mono % 9.5 4.0 - 15.0 %    Eosinophil % 6.4 0.0 - 8.0 %    Basophil % 0.5 0.0 - 1.9 %    Differential Method Automated       Assessment:       1. RLQ abdominal pain        Plan:       RLQ abdominal pain  Comments:  Presentation is consistent with acute appendicitis and her daughters had transferred her POV to the [facility].  The patient is stable.        Medication List with Changes/Refills   Current Medications    ASPIRIN (ECOTRIN) 81 MG EC TABLET    Take 81 mg by mouth once daily.    CYANOCOBALAMIN (VITAMIN B-12) 1000 MCG TABLET    Take 500 mcg  by mouth once daily.     FERROUS SULFATE, DRIED (SLOW FE) 160 MG (50 MG IRON) TBSR    Take 1 tablet (160 mg total) by mouth once daily.    METOPROLOL TARTRATE (LOPRESSOR) 25 MG TABLET    TAKE 1 TABLET BY MOUTH TWICE A DAY    PRAVASTATIN (PRAVACHOL) 20 MG TABLET    TAKE 1 TABLET BY MOUTH EVERY DAY

## 2022-11-30 ENCOUNTER — OFFICE VISIT (OUTPATIENT)
Dept: FAMILY MEDICINE | Facility: CLINIC | Age: 87
End: 2022-11-30
Payer: MEDICARE

## 2022-11-30 ENCOUNTER — LAB VISIT (OUTPATIENT)
Dept: LAB | Facility: HOSPITAL | Age: 87
End: 2022-11-30
Attending: INTERNAL MEDICINE
Payer: MEDICARE

## 2022-11-30 VITALS
SYSTOLIC BLOOD PRESSURE: 130 MMHG | BODY MASS INDEX: 18.86 KG/M2 | OXYGEN SATURATION: 99 % | HEART RATE: 66 BPM | WEIGHT: 99.88 LBS | HEIGHT: 61 IN | DIASTOLIC BLOOD PRESSURE: 62 MMHG

## 2022-11-30 DIAGNOSIS — I48.0 PAROXYSMAL ATRIAL FIBRILLATION: ICD-10-CM

## 2022-11-30 DIAGNOSIS — I25.10 CORONARY ARTERY DISEASE, UNSPECIFIED VESSEL OR LESION TYPE, UNSPECIFIED WHETHER ANGINA PRESENT, UNSPECIFIED WHETHER NATIVE OR TRANSPLANTED HEART: ICD-10-CM

## 2022-11-30 DIAGNOSIS — D63.1 ANEMIA IN STAGE 2 CHRONIC KIDNEY DISEASE: ICD-10-CM

## 2022-11-30 DIAGNOSIS — N18.30 STAGE 3 CHRONIC KIDNEY DISEASE, UNSPECIFIED WHETHER STAGE 3A OR 3B CKD: ICD-10-CM

## 2022-11-30 DIAGNOSIS — I25.9 ISCHEMIC HEART DISEASE DUE TO CORONARY ARTERY OBSTRUCTION: ICD-10-CM

## 2022-11-30 DIAGNOSIS — I24.0 ISCHEMIC HEART DISEASE DUE TO CORONARY ARTERY OBSTRUCTION: ICD-10-CM

## 2022-11-30 DIAGNOSIS — D50.0 ANEMIA DUE TO CHRONIC BLOOD LOSS: ICD-10-CM

## 2022-11-30 DIAGNOSIS — R63.6 UNDERWEIGHT DUE TO INADEQUATE CALORIC INTAKE: Primary | ICD-10-CM

## 2022-11-30 DIAGNOSIS — R19.5 OCCULT BLOOD POSITIVE STOOL: ICD-10-CM

## 2022-11-30 DIAGNOSIS — I10 ESSENTIAL HYPERTENSION: ICD-10-CM

## 2022-11-30 DIAGNOSIS — N18.2 ANEMIA IN STAGE 2 CHRONIC KIDNEY DISEASE: ICD-10-CM

## 2022-11-30 LAB
BASOPHILS # BLD AUTO: 0.03 K/UL (ref 0–0.2)
BASOPHILS NFR BLD: 0.4 % (ref 0–1.9)
DIFFERENTIAL METHOD: ABNORMAL
EOSINOPHIL # BLD AUTO: 0.4 K/UL (ref 0–0.5)
EOSINOPHIL NFR BLD: 5.4 % (ref 0–8)
ERYTHROCYTE [DISTWIDTH] IN BLOOD BY AUTOMATED COUNT: 13 % (ref 11.5–14.5)
FERRITIN SERPL-MCNC: 534 NG/ML (ref 20–300)
HCT VFR BLD AUTO: 32.6 % (ref 37–48.5)
HGB BLD-MCNC: 10.9 G/DL (ref 12–16)
IMM GRANULOCYTES # BLD AUTO: 0.01 K/UL (ref 0–0.04)
IMM GRANULOCYTES NFR BLD AUTO: 0.1 % (ref 0–0.5)
LYMPHOCYTES # BLD AUTO: 2.3 K/UL (ref 1–4.8)
LYMPHOCYTES NFR BLD: 33.6 % (ref 18–48)
MCH RBC QN AUTO: 31.5 PG (ref 27–31)
MCHC RBC AUTO-ENTMCNC: 33.4 G/DL (ref 32–36)
MCV RBC AUTO: 94 FL (ref 82–98)
MONOCYTES # BLD AUTO: 0.5 K/UL (ref 0.3–1)
MONOCYTES NFR BLD: 7.2 % (ref 4–15)
NEUTROPHILS # BLD AUTO: 3.7 K/UL (ref 1.8–7.7)
NEUTROPHILS NFR BLD: 53.3 % (ref 38–73)
NRBC BLD-RTO: 0 /100 WBC
PLATELET # BLD AUTO: 237 K/UL (ref 150–450)
PMV BLD AUTO: 9.4 FL (ref 9.2–12.9)
RBC # BLD AUTO: 3.46 M/UL (ref 4–5.4)
WBC # BLD AUTO: 6.85 K/UL (ref 3.9–12.7)

## 2022-11-30 PROCEDURE — 85025 COMPLETE CBC W/AUTO DIFF WBC: CPT | Mod: PO | Performed by: INTERNAL MEDICINE

## 2022-11-30 PROCEDURE — 36415 COLL VENOUS BLD VENIPUNCTURE: CPT | Mod: PO | Performed by: INTERNAL MEDICINE

## 2022-11-30 PROCEDURE — 99999 PR PBB SHADOW E&M-EST. PATIENT-LVL III: ICD-10-PCS | Mod: PBBFAC,,, | Performed by: FAMILY MEDICINE

## 2022-11-30 PROCEDURE — 99214 PR OFFICE/OUTPT VISIT, EST, LEVL IV, 30-39 MIN: ICD-10-PCS | Mod: S$PBB,,, | Performed by: FAMILY MEDICINE

## 2022-11-30 PROCEDURE — 99214 OFFICE O/P EST MOD 30 MIN: CPT | Mod: S$PBB,,, | Performed by: FAMILY MEDICINE

## 2022-11-30 PROCEDURE — 99213 OFFICE O/P EST LOW 20 MIN: CPT | Mod: PBBFAC,PO | Performed by: FAMILY MEDICINE

## 2022-11-30 PROCEDURE — 82728 ASSAY OF FERRITIN: CPT | Performed by: INTERNAL MEDICINE

## 2022-11-30 PROCEDURE — 99999 PR PBB SHADOW E&M-EST. PATIENT-LVL III: CPT | Mod: PBBFAC,,, | Performed by: FAMILY MEDICINE

## 2022-11-30 RX ORDER — PRAVASTATIN SODIUM 20 MG/1
TABLET ORAL
COMMUNITY
Start: 2022-10-22 | End: 2022-11-30

## 2022-11-30 NOTE — PROGRESS NOTES
"Subjective:       Patient ID: Sobeida Mcclain is a 87 y.o. female.    Chief Complaint: Follow-up (3 month follow up)    Here today to follow up on chronic medical conditions.     Weight loss:  she has gained 5 lbs since her visit in aug.  HTN:  She is on Lopressor 25 mg daily.  CAD/A. Fib/PVD:  She is on Beta blocker and Statin (Pravastatin 20 mg), but she stopped the Statin as it "makes her crazy".  She is seeing Cardiology (Jorje).  She is having a stress test soon.  Vascular calcification noted on CT from 2016  Anemia: she is iron def.  She has seen Heme/Onc and has a f/u appt tomorrow.  CKD: lab work in May shows decreased eGFR consistent with chronic kidney disease.    Follow-up  Pertinent negatives include no abdominal pain, chest pain, coughing, fatigue, fever, headaches, nausea or rash.   Review of Systems   Constitutional:  Negative for activity change, appetite change, fatigue and fever.   Respiratory:  Negative for cough, shortness of breath and wheezing.    Cardiovascular:  Negative for chest pain and palpitations.   Gastrointestinal:  Negative for abdominal pain, constipation, diarrhea and nausea.   Skin:  Negative for pallor and rash.   Neurological:  Negative for dizziness, syncope, light-headedness and headaches.     Objective:      Vitals:    11/30/22 1122   BP: 130/62   Pulse: 66   SpO2: 99%   Weight: 45.3 kg (99 lb 13.9 oz)   Height: 5' 1" (1.549 m)      Physical Exam  Constitutional:       General: She is not in acute distress.     Appearance: She is not toxic-appearing.   Cardiovascular:      Rate and Rhythm: Normal rate and regular rhythm.      Heart sounds: Normal heart sounds. No murmur heard.  Pulmonary:      Effort: Pulmonary effort is normal. No respiratory distress.      Breath sounds: Normal breath sounds. No wheezing, rhonchi or rales.   Neurological:      General: No focal deficit present.      Mental Status: She is alert.   Psychiatric:         Mood and Affect: Mood normal.         " Behavior: Behavior normal.         Thought Content: Thought content normal.       Results for orders placed or performed in visit on 11/30/22   CBC Auto Differential   Result Value Ref Range    WBC 6.85 3.90 - 12.70 K/uL    RBC 3.46 (L) 4.00 - 5.40 M/uL    Hemoglobin 10.9 (L) 12.0 - 16.0 g/dL    Hematocrit 32.6 (L) 37.0 - 48.5 %    MCV 94 82 - 98 fL    MCH 31.5 (H) 27.0 - 31.0 pg    MCHC 33.4 32.0 - 36.0 g/dL    RDW 13.0 11.5 - 14.5 %    Platelets 237 150 - 450 K/uL    MPV 9.4 9.2 - 12.9 fL    Immature Granulocytes 0.1 0.0 - 0.5 %    Gran # (ANC) 3.7 1.8 - 7.7 K/uL    Immature Grans (Abs) 0.01 0.00 - 0.04 K/uL    Lymph # 2.3 1.0 - 4.8 K/uL    Mono # 0.5 0.3 - 1.0 K/uL    Eos # 0.4 0.0 - 0.5 K/uL    Baso # 0.03 0.00 - 0.20 K/uL    nRBC 0 0 /100 WBC    Gran % 53.3 38.0 - 73.0 %    Lymph % 33.6 18.0 - 48.0 %    Mono % 7.2 4.0 - 15.0 %    Eosinophil % 5.4 0.0 - 8.0 %    Basophil % 0.4 0.0 - 1.9 %    Differential Method Automated       Assessment:       1. Underweight due to inadequate caloric intake    2. Essential hypertension    3. Paroxysmal atrial fibrillation    4. 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 Coronary artery disease, unspecified vessel or lesion type, unspecified whether angina present, unspecified whether native or transplanted heart    6. Stage 3 chronic kidney disease, unspecified whether stage 3a or 3b CKD    7. Anemia in stage 2 chronic kidney disease    8. 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  We discussed a possible GI work up and deferred at this time due to age and co-morbid conditions.  Even if Colon CA, she states she would not want to do treatment.  Medication List with Changes/Refills   Current Medications    ASPIRIN (ECOTRIN) 81 MG EC TABLET    Take 81 mg by mouth once daily.    CYANOCOBALAMIN (VITAMIN B-12) 1000 MCG TABLET    Take 500 mcg by mouth once daily.     METOPROLOL TARTRATE (LOPRESSOR) 25 MG TABLET    TAKE 1 TABLET BY MOUTH TWICE A DAY   Discontinued Medications    PRAVASTATIN (PRAVACHOL) 20 MG TABLET

## 2022-12-01 ENCOUNTER — OFFICE VISIT (OUTPATIENT)
Dept: HEMATOLOGY/ONCOLOGY | Facility: CLINIC | Age: 87
End: 2022-12-01
Payer: MEDICARE

## 2022-12-01 VITALS
OXYGEN SATURATION: 98 % | TEMPERATURE: 97 F | BODY MASS INDEX: 19.15 KG/M2 | HEIGHT: 61 IN | HEART RATE: 63 BPM | WEIGHT: 101.44 LBS | RESPIRATION RATE: 16 BRPM | SYSTOLIC BLOOD PRESSURE: 144 MMHG | DIASTOLIC BLOOD PRESSURE: 60 MMHG

## 2022-12-01 DIAGNOSIS — D64.9 ANEMIA, UNSPECIFIED TYPE: Primary | ICD-10-CM

## 2022-12-01 DIAGNOSIS — D50.0 ANEMIA DUE TO CHRONIC BLOOD LOSS: Primary | ICD-10-CM

## 2022-12-01 PROCEDURE — 99213 OFFICE O/P EST LOW 20 MIN: CPT | Mod: S$PBB,,, | Performed by: INTERNAL MEDICINE

## 2022-12-01 PROCEDURE — 99213 PR OFFICE/OUTPT VISIT, EST, LEVL III, 20-29 MIN: ICD-10-PCS | Mod: S$PBB,,, | Performed by: INTERNAL MEDICINE

## 2022-12-01 PROCEDURE — 99999 PR PBB SHADOW E&M-EST. PATIENT-LVL III: CPT | Mod: PBBFAC,,, | Performed by: INTERNAL MEDICINE

## 2022-12-01 PROCEDURE — 99213 OFFICE O/P EST LOW 20 MIN: CPT | Mod: PBBFAC,PN | Performed by: INTERNAL MEDICINE

## 2022-12-01 PROCEDURE — 99999 PR PBB SHADOW E&M-EST. PATIENT-LVL III: ICD-10-PCS | Mod: PBBFAC,,, | Performed by: INTERNAL MEDICINE

## 2022-12-01 NOTE — Clinical Note
Return in 3 months with a CBC 1 day prior.  Please give her 3 stool cards to submit 3 stool samples at the time of the next visit

## 2022-12-01 NOTE — PROGRESS NOTES
Subjective:       Patient ID: Sobeida Mcclain is a 87 y.o. female.    Chief Complaint: No chief complaint on file.    HPI  Mrs. Mcclain returns today for follow-up.  I had last seen her on September 29, 2022.  Repeat labs from yesterday show a ferritin of 534 ng/mL.  WBCs are 6,800 per cubic mm, hemoglobin 10.9 g per dL, hematocrit 32.6% and platelets 237K.  MCV is 94.    Briefly, she is an 87-year-old female referred for evaluation for chronic anemia.  A CBC on August 31, 2022 had shown a white count of 5,800 per cubic mm, hemoglobin 10.6 grams/deciliter hematocrit 32.2%, MCV 93, and platelets 289,000 per cubic mm.  On January 25, 2021, her WBCs were 5,700 per cubic mm, hemoglobin 11 grams/deciliter, hematocrit 34.5%, MCV 97, and platelets 252,000 per cubic mm.  On July 22, 2020, her WBCs were 8,500 per cubic mm, hemoglobin 11.5 grams/deciliter, hematocrit 33.5%, MCV 92, and platelets 348,000 per cubic mm.  On October 31, 2019 his white count was 6,600 per cubic mm, hemoglobin 11.8 grams/deciliter, hematocrit 35.9%, MCV 93 and platelets 285,000 per cubic mm.      In the past she has been treated with oral iron which she tolerated poorly and has been off of it for a few months.    Other recent pertinent labs include a TSH of 1.5uIU/ml, in late August, a normal B12 at 651 pg/mL in late May, and a ferritin of 605 ng/mL in late May.  Several urinalysis in the past had shown varying degrees of hematuria, however the most recent urinalysis had shown no hematuria,  SPEP was normal, and her creatinine was 1.2 mg/dL  At least 1 stool sample had shown evidence of occult blood.  I had recommended a colonoscopy and EGD, however her daughter informed me today that her mother has decided against undergoing the procedures.      Review of Systems    Overall she feels well.  Other than mild fatigue with exertion and feeling cold, she has no additional complaints.  ECOG PS is 1.  She denies any anxiety, depression, easy bruising, fevers,  chills, night  sweats, weight loss, nausea, vomiting, diarrhea, constipation, diplopia, blurred vision, headache, chest pain, palpitations, shortness of breath, breast pain, abdominal pain, extremity pain, or difficulty ambulating.  The remainder of the ten-point ROS, including general, skin, lymph, H/N, cardiorespiratory, GI, , Neuro, Endocrine, and psychiatric is negative.    Objective:      Physical Exam      She is alert, oriented to time, place, person, pleasant, well      nourished, in no acute physical distress.   She is accompanied by her daughter.                                 VITAL SIGNS:  Reviewed                                      HEENT:  Normal.  There are no nasal, oral, lip, gingival, auricular, lid,    or conjunctival lesions.  Mucosae are moist and pink, and there is no        thrush.  Pupils are equal, reactive to light and accommodation.              Extraocular muscle movements are intact.  She is edentulous.  There is no frontal or maxillary tenderness.                                     NECK:  Supple without JVD, adenopathy, or thyromegaly.                       LUNGS:  Clear to auscultation without wheezing, rales, or rhonchi.           CARDIOVASCULAR:  Reveals an S1, S2, no murmurs, no rubs, no gallops.         ABDOMEN:  Soft, nontender, without organomegaly.  Bowel sounds are    present.                                                                     EXTREMITIES:  No cyanosis, clubbing, or edema.                               BREASTS:    She has a loop recorder in place.                                    LYMPHATIC:  There is no cervical, axillary, or supraclavicular adenopathy.   SKIN:  Warm and moist, without petechiae, rashes, induration, or ecchymoses.           NEUROLOGIC:  DTRs are 0-1+ bilaterally, symmetrical, motor function is 5/5,  and cranial nerves are  within normal limits.   Assessment:       Problem List Items Addressed This Visit       Anemia, normochromic  normocytic, longstanding, mild, now with evidence of GI bleed       2. Coronary artery disease and peripheral vascular disease  Plan:         I had a long discussion with her and her daughter; her anemia is normochromic normocytic and it is longstanding.  There is evidence of GI blood loss, however, the patient has decided not to pursue GI workup.  We discussed the option of a bone marrow biopsy.  She elected not to pursue 1 for now which I do not think is unreasonable given the chronicity of her anemia.  I have asked her to return in 3 months with a repeat CBC and she will submit 3 more stool samples at that time.  Her daughter's multiple questions were answered to her satisfaction.

## 2023-03-15 ENCOUNTER — TELEPHONE (OUTPATIENT)
Dept: HEMATOLOGY/ONCOLOGY | Facility: CLINIC | Age: 88
End: 2023-03-15
Payer: MEDICARE

## 2023-03-15 NOTE — TELEPHONE ENCOUNTER
Spoke with patient's daughter to reschedule her appointment due to md will be out of town she states she thought she canceled that appointment there is no need for her to see md due to mother isn't going to do anything about it and she has always been anemic she understood to call us if they need anything number was provided

## 2023-03-28 ENCOUNTER — TELEPHONE (OUTPATIENT)
Dept: FAMILY MEDICINE | Facility: CLINIC | Age: 88
End: 2023-03-28

## 2023-03-29 NOTE — TELEPHONE ENCOUNTER
----- Message from Sarai Walker sent at 3/28/2023 12:29 PM CDT -----  Contact: Aaliyah Izquierdo  Type:  Sooner Appointment Request    Caller is requesting a sooner appointment.  Caller declined first available appointment listed below.  Caller will not accept being placed on the waitlist and is requesting a message be sent to doctor.    Name of Caller:  Aaliyah Izquierdo  When is the first available appointment?  05/11  Symptoms:  [facility] F/U needed for BP Issues  Best Call Back Number:  [phone]  Additional Information:  If possible she is looking for an appt on Thursday or Friday b/c of her work schedule. Thank You.

## 2023-04-13 ENCOUNTER — OFFICE VISIT (OUTPATIENT)
Dept: FAMILY MEDICINE | Facility: CLINIC | Age: 88
End: 2023-04-13
Payer: MEDICARE

## 2023-04-13 VITALS
TEMPERATURE: 98 F | OXYGEN SATURATION: 98 % | HEIGHT: 61 IN | BODY MASS INDEX: 18.53 KG/M2 | SYSTOLIC BLOOD PRESSURE: 124 MMHG | DIASTOLIC BLOOD PRESSURE: 48 MMHG | HEART RATE: 65 BPM | WEIGHT: 98.13 LBS

## 2023-04-13 DIAGNOSIS — N18.30 STAGE 3 CHRONIC KIDNEY DISEASE, UNSPECIFIED WHETHER STAGE 3A OR 3B CKD: ICD-10-CM

## 2023-04-13 DIAGNOSIS — I48.0 PAF (PAROXYSMAL ATRIAL FIBRILLATION): ICD-10-CM

## 2023-04-13 DIAGNOSIS — I10 ESSENTIAL HYPERTENSION: Primary | ICD-10-CM

## 2023-04-13 PROCEDURE — 99213 OFFICE O/P EST LOW 20 MIN: CPT | Mod: PBBFAC,PO | Performed by: NURSE PRACTITIONER

## 2023-04-13 PROCEDURE — 99999 PR PBB SHADOW E&M-EST. PATIENT-LVL III: ICD-10-PCS | Mod: PBBFAC,,, | Performed by: NURSE PRACTITIONER

## 2023-04-13 PROCEDURE — 99999 PR PBB SHADOW E&M-EST. PATIENT-LVL III: CPT | Mod: PBBFAC,,, | Performed by: NURSE PRACTITIONER

## 2023-04-13 PROCEDURE — 99214 OFFICE O/P EST MOD 30 MIN: CPT | Mod: S$PBB,,, | Performed by: NURSE PRACTITIONER

## 2023-04-13 PROCEDURE — 99214 PR OFFICE/OUTPT VISIT, EST, LEVL IV, 30-39 MIN: ICD-10-PCS | Mod: S$PBB,,, | Performed by: NURSE PRACTITIONER

## 2023-04-13 RX ORDER — AMLODIPINE BESYLATE 10 MG/1
1 TABLET ORAL DAILY
COMMUNITY
Start: 2023-04-11 | End: 2023-07-19 | Stop reason: SDUPTHER

## 2023-04-13 RX ORDER — MIRTAZAPINE 15 MG/1
1 TABLET, ORALLY DISINTEGRATING ORAL NIGHTLY
COMMUNITY
Start: 2023-04-11 | End: 2023-07-19

## 2023-04-13 RX ORDER — TRIAMTERENE AND HYDROCHLOROTHIAZIDE 37.5; 25 MG/1; MG/1
1 CAPSULE ORAL EVERY MORNING
COMMUNITY
End: 2023-07-19 | Stop reason: ALTCHOICE

## 2023-04-13 RX ORDER — VALSARTAN 80 MG/1
1 TABLET ORAL DAILY
COMMUNITY
Start: 2023-04-11 | End: 2023-05-31 | Stop reason: SDUPTHER

## 2023-04-13 RX ORDER — AMLODIPINE BESYLATE 10 MG/1
10 TABLET ORAL
COMMUNITY
Start: 2023-03-24 | End: 2023-05-31 | Stop reason: SDUPTHER

## 2023-04-13 RX ORDER — VALSARTAN 80 MG/1
80 TABLET ORAL
COMMUNITY
Start: 2023-03-24 | End: 2023-05-31

## 2023-04-13 NOTE — PROGRESS NOTES
Subjective:       Patient ID: Sobeida Mcclain is a 87 y.o. female.    Chief Complaint: Hypertension    HPI  Patient presents accompanied by daughter  Hospitalized at [place] for uncontrolled HTN    PAF, CAD, HTN: cardiologist Dr Recinos, saw after discharge in clinic 4/5/23  BP stable, tolerating meds, monitoring BP at home.     Vitals:    04/13/23 0940   BP: (!) 124/48   Pulse: 65   Temp: 97.6 °F (36.4 °C)     Review of Systems   Constitutional:  Negative for fever.   Respiratory:  Negative for cough.    Cardiovascular:  Negative for chest pain.   Neurological:  Negative for dizziness and light-headedness.     Past Medical History:   Diagnosis Date    Anticoagulant long-term use     Carotid artery disease     Coronary artery disease     chest discomfort, does not take NTG    Hypertension      Objective:      Physical Exam  Vitals and nursing note reviewed.   Constitutional:       General: She is not in acute distress.     Appearance: She is not diaphoretic.   HENT:      Head: Normocephalic.   Eyes:      General:         Right eye: No discharge.         Left eye: No discharge.   Neck:      Trachea: No tracheal deviation.   Cardiovascular:      Rate and Rhythm: Normal rate and regular rhythm.      Heart sounds: Normal heart sounds.   Pulmonary:      Effort: Pulmonary effort is normal.      Breath sounds: Normal breath sounds.   Skin:     Coloration: Skin is not pale.   Neurological:      Mental Status: She is alert and oriented to person, place, and time.   Psychiatric:         Speech: Speech normal.         Behavior: Behavior normal.         Thought Content: Thought content normal.         Judgment: Judgment normal.       Assessment:       1. Essential hypertension    2. PAF (paroxysmal atrial fibrillation)    3. Stage 3 chronic kidney disease, unspecified whether stage 3a or 3b CKD        Plan:       Essential hypertension   Controlled; continue current medications. Educated on s/s hypotension/BP parameters    PAF  (paroxysmal atrial fibrillation)   On BB; Dr Recinos    Stage 3 chronic kidney disease, unspecified whether stage 3a or 3b CKD   Stable, HTN controlled        FU routinely with PCP and cardiology, sooner as needed      Medication List with Changes/Refills   Current Medications    AMLODIPINE (NORVASC) 10 MG TABLET    Take 1 tablet by mouth once daily.    AMLODIPINE (NORVASC) 10 MG TABLET    Take 10 mg by mouth.    ASPIRIN (ECOTRIN) 81 MG EC TABLET    Take 81 mg by mouth once daily.    CYANOCOBALAMIN (VITAMIN B-12) 1000 MCG TABLET    Take 500 mcg by mouth once daily.     METOPROLOL TARTRATE (LOPRESSOR) 25 MG TABLET    TAKE 1 TABLET BY MOUTH TWICE A DAY    MIRTAZAPINE (REMERON SOL-TAB) 15 MG DISINTEGRATING TABLET    Take 1 tablet by mouth every evening.    TRIAMTERENE-HYDROCHLOROTHIAZIDE 37.5-25 MG (DYAZIDE) 37.5-25 MG PER CAPSULE    Take 1 capsule by mouth every morning.    VALSARTAN (DIOVAN) 80 MG TABLET    Take 1 tablet by mouth once daily.    VALSARTAN (DIOVAN) 80 MG TABLET    Take 80 mg by mouth.

## 2023-05-03 DIAGNOSIS — Z71.89 COMPLEX CARE COORDINATION: ICD-10-CM

## 2023-05-31 ENCOUNTER — OFFICE VISIT (OUTPATIENT)
Dept: FAMILY MEDICINE | Facility: CLINIC | Age: 88
End: 2023-05-31
Payer: MEDICARE

## 2023-05-31 ENCOUNTER — LAB VISIT (OUTPATIENT)
Dept: LAB | Facility: HOSPITAL | Age: 88
End: 2023-05-31
Payer: MEDICARE

## 2023-05-31 VITALS
HEART RATE: 60 BPM | BODY MASS INDEX: 18.87 KG/M2 | WEIGHT: 99.88 LBS | SYSTOLIC BLOOD PRESSURE: 108 MMHG | DIASTOLIC BLOOD PRESSURE: 40 MMHG | OXYGEN SATURATION: 95 %

## 2023-05-31 DIAGNOSIS — I10 ESSENTIAL HYPERTENSION: ICD-10-CM

## 2023-05-31 DIAGNOSIS — N18.30 STAGE 3 CHRONIC KIDNEY DISEASE, UNSPECIFIED WHETHER STAGE 3A OR 3B CKD: ICD-10-CM

## 2023-05-31 DIAGNOSIS — D50.0 IRON DEFICIENCY ANEMIA DUE TO CHRONIC BLOOD LOSS: ICD-10-CM

## 2023-05-31 DIAGNOSIS — N18.30 STAGE 3 CHRONIC KIDNEY DISEASE, UNSPECIFIED WHETHER STAGE 3A OR 3B CKD: Primary | ICD-10-CM

## 2023-05-31 LAB
ALBUMIN SERPL BCP-MCNC: 3.7 G/DL (ref 3.5–5.2)
ALP SERPL-CCNC: 66 U/L (ref 55–135)
ALT SERPL W/O P-5'-P-CCNC: 7 U/L (ref 10–44)
ANION GAP SERPL CALC-SCNC: 10 MMOL/L (ref 8–16)
AST SERPL-CCNC: 14 U/L (ref 10–40)
BASOPHILS # BLD AUTO: 0.04 K/UL (ref 0–0.2)
BASOPHILS NFR BLD: 0.5 % (ref 0–1.9)
BILIRUB SERPL-MCNC: 0.4 MG/DL (ref 0.1–1)
BUN SERPL-MCNC: 22 MG/DL (ref 8–23)
CALCIUM SERPL-MCNC: 9.7 MG/DL (ref 8.7–10.5)
CHLORIDE SERPL-SCNC: 104 MMOL/L (ref 95–110)
CHOLEST SERPL-MCNC: 160 MG/DL (ref 120–199)
CHOLEST/HDLC SERPL: 3.4 {RATIO} (ref 2–5)
CO2 SERPL-SCNC: 25 MMOL/L (ref 23–29)
CREAT SERPL-MCNC: 1.1 MG/DL (ref 0.5–1.4)
DIFFERENTIAL METHOD: ABNORMAL
EOSINOPHIL # BLD AUTO: 0.3 K/UL (ref 0–0.5)
EOSINOPHIL NFR BLD: 4.1 % (ref 0–8)
ERYTHROCYTE [DISTWIDTH] IN BLOOD BY AUTOMATED COUNT: 12.6 % (ref 11.5–14.5)
EST. GFR  (NO RACE VARIABLE): 49.2 ML/MIN/1.73 M^2
FERRITIN SERPL-MCNC: 430 NG/ML (ref 20–300)
GLUCOSE SERPL-MCNC: 73 MG/DL (ref 70–110)
HCT VFR BLD AUTO: 31.8 % (ref 37–48.5)
HDLC SERPL-MCNC: 47 MG/DL (ref 40–75)
HDLC SERPL: 29.4 % (ref 20–50)
HGB BLD-MCNC: 10.4 G/DL (ref 12–16)
IMM GRANULOCYTES # BLD AUTO: 0.01 K/UL (ref 0–0.04)
IMM GRANULOCYTES NFR BLD AUTO: 0.1 % (ref 0–0.5)
IRON SERPL-MCNC: 34 UG/DL (ref 30–160)
LDLC SERPL CALC-MCNC: 96.6 MG/DL (ref 63–159)
LYMPHOCYTES # BLD AUTO: 1.8 K/UL (ref 1–4.8)
LYMPHOCYTES NFR BLD: 23.7 % (ref 18–48)
MCH RBC QN AUTO: 31.8 PG (ref 27–31)
MCHC RBC AUTO-ENTMCNC: 32.7 G/DL (ref 32–36)
MCV RBC AUTO: 97 FL (ref 82–98)
MONOCYTES # BLD AUTO: 0.8 K/UL (ref 0.3–1)
MONOCYTES NFR BLD: 10.1 % (ref 4–15)
NEUTROPHILS # BLD AUTO: 4.5 K/UL (ref 1.8–7.7)
NEUTROPHILS NFR BLD: 61.5 % (ref 38–73)
NONHDLC SERPL-MCNC: 113 MG/DL
NRBC BLD-RTO: 0 /100 WBC
PLATELET # BLD AUTO: 248 K/UL (ref 150–450)
PMV BLD AUTO: 10.4 FL (ref 9.2–12.9)
POTASSIUM SERPL-SCNC: 4 MMOL/L (ref 3.5–5.1)
PROT SERPL-MCNC: 7.4 G/DL (ref 6–8.4)
RBC # BLD AUTO: 3.27 M/UL (ref 4–5.4)
SATURATED IRON: 12 % (ref 20–50)
SODIUM SERPL-SCNC: 139 MMOL/L (ref 136–145)
TOTAL IRON BINDING CAPACITY: 277 UG/DL (ref 250–450)
TRANSFERRIN SERPL-MCNC: 187 MG/DL (ref 200–375)
TRIGL SERPL-MCNC: 82 MG/DL (ref 30–150)
WBC # BLD AUTO: 7.39 K/UL (ref 3.9–12.7)

## 2023-05-31 PROCEDURE — 99214 OFFICE O/P EST MOD 30 MIN: CPT | Mod: S$PBB,,, | Performed by: FAMILY MEDICINE

## 2023-05-31 PROCEDURE — 84466 ASSAY OF TRANSFERRIN: CPT | Performed by: FAMILY MEDICINE

## 2023-05-31 PROCEDURE — 99999 PR PBB SHADOW E&M-EST. PATIENT-LVL III: CPT | Mod: PBBFAC,,, | Performed by: FAMILY MEDICINE

## 2023-05-31 PROCEDURE — 99213 OFFICE O/P EST LOW 20 MIN: CPT | Mod: PBBFAC,PO | Performed by: FAMILY MEDICINE

## 2023-05-31 PROCEDURE — 80061 LIPID PANEL: CPT | Performed by: FAMILY MEDICINE

## 2023-05-31 PROCEDURE — 99999 PR PBB SHADOW E&M-EST. PATIENT-LVL III: ICD-10-PCS | Mod: PBBFAC,,, | Performed by: FAMILY MEDICINE

## 2023-05-31 PROCEDURE — 82728 ASSAY OF FERRITIN: CPT | Performed by: FAMILY MEDICINE

## 2023-05-31 PROCEDURE — 85025 COMPLETE CBC W/AUTO DIFF WBC: CPT | Performed by: FAMILY MEDICINE

## 2023-05-31 PROCEDURE — 80053 COMPREHEN METABOLIC PANEL: CPT | Performed by: FAMILY MEDICINE

## 2023-05-31 PROCEDURE — 82668 ASSAY OF ERYTHROPOIETIN: CPT | Performed by: FAMILY MEDICINE

## 2023-05-31 PROCEDURE — 99214 PR OFFICE/OUTPT VISIT, EST, LEVL IV, 30-39 MIN: ICD-10-PCS | Mod: S$PBB,,, | Performed by: FAMILY MEDICINE

## 2023-05-31 NOTE — PROGRESS NOTES
Subjective:       Patient ID: Sobeida Mcclain is a 87 y.o. female.    Chief Complaint: Establish Care (Discuss weight )    HPI:  Pleasant 87-year-old patient here today with her daughter Virginia.  She is here for follow-up.  She is running low blood pressure.  In the past she is had quite elevated BP is.  I would like to take her off the losartan completely right now because her systolic is 108.  Concerned that she might become orthostatic have trouble with balance or fall.      The patient has been evaluated for anemia.  More than likely EP deficiency due to CKD 3.  Recommend Nephrology consultation.  Follow up with me in 3 months.      Review of Systems   Constitutional: Negative.    HENT: Negative.     Eyes: Negative.    Respiratory: Negative.     Cardiovascular: Negative.    Gastrointestinal: Negative.    Endocrine: Negative.    Genitourinary: Negative.    Musculoskeletal: Negative.    Skin: Negative.    Allergic/Immunologic: Negative.    Neurological: Negative.    Hematological: Negative.    Psychiatric/Behavioral: Negative.       Objective:      Vitals:    05/31/23 1108   BP: (!) 108/40   BP Location: Left arm   Patient Position: Sitting   Pulse: 60   SpO2: 95%   Weight: 45.3 kg (99 lb 13.9 oz)      Physical Exam  Vitals and nursing note reviewed.   Constitutional:       Appearance: Normal appearance. She is normal weight.   HENT:      Head: Normocephalic and atraumatic.      Nose: Nose normal.      Mouth/Throat:      Mouth: Mucous membranes are moist.      Pharynx: Oropharynx is clear.   Eyes:      Extraocular Movements: Extraocular movements intact.      Conjunctiva/sclera: Conjunctivae normal.      Pupils: Pupils are equal, round, and reactive to light.   Cardiovascular:      Rate and Rhythm: Normal rate and regular rhythm.      Pulses: Normal pulses.      Heart sounds: Normal heart sounds.   Pulmonary:      Effort: Pulmonary effort is normal.      Breath sounds: Normal breath sounds.   Abdominal:       General: Abdomen is flat. Bowel sounds are normal.      Palpations: Abdomen is soft.   Musculoskeletal:         General: Normal range of motion.      Cervical back: Normal range of motion and neck supple.   Skin:     General: Skin is warm and dry.      Capillary Refill: Capillary refill takes less than 2 seconds.   Neurological:      General: No focal deficit present.      Mental Status: She is alert and oriented to person, place, and time. Mental status is at baseline.   Psychiatric:         Mood and Affect: Mood normal.         Behavior: Behavior normal.         Thought Content: Thought content normal.         Judgment: Judgment normal.       Results for orders placed or performed in visit on 11/30/22   CBC Auto Differential   Result Value Ref Range    WBC 6.85 3.90 - 12.70 K/uL    RBC 3.46 (L) 4.00 - 5.40 M/uL    Hemoglobin 10.9 (L) 12.0 - 16.0 g/dL    Hematocrit 32.6 (L) 37.0 - 48.5 %    MCV 94 82 - 98 fL    MCH 31.5 (H) 27.0 - 31.0 pg    MCHC 33.4 32.0 - 36.0 g/dL    RDW 13.0 11.5 - 14.5 %    Platelets 237 150 - 450 K/uL    MPV 9.4 9.2 - 12.9 fL    Immature Granulocytes 0.1 0.0 - 0.5 %    Gran # (ANC) 3.7 1.8 - 7.7 K/uL    Immature Grans (Abs) 0.01 0.00 - 0.04 K/uL    Lymph # 2.3 1.0 - 4.8 K/uL    Mono # 0.5 0.3 - 1.0 K/uL    Eos # 0.4 0.0 - 0.5 K/uL    Baso # 0.03 0.00 - 0.20 K/uL    nRBC 0 0 /100 WBC    Gran % 53.3 38.0 - 73.0 %    Lymph % 33.6 18.0 - 48.0 %    Mono % 7.2 4.0 - 15.0 %    Eosinophil % 5.4 0.0 - 8.0 %    Basophil % 0.4 0.0 - 1.9 %    Differential Method Automated    Ferritin   Result Value Ref Range    Ferritin 534 (H) 20.0 - 300.0 ng/mL      Assessment:       1. Stage 3 chronic kidney disease, unspecified whether stage 3a or 3b CKD    2. Essential hypertension    3. Iron deficiency anemia due to chronic blood loss        Plan:       Stage 3 chronic kidney disease, unspecified whether stage 3a or 3b CKD  -     Ambulatory referral/consult to Nephrology; Future; Expected date: 06/07/2023  -      CBC Auto Differential; Future; Expected date: 05/31/2023    Essential hypertension  Comments:  Currently patient is over treated for hypertension.  We will begin to back down on those meds.  Return in 3 months.  Orders:  -     Comprehensive Metabolic Panel; Future; Expected date: 05/31/2023  -     Lipid Panel; Future; Expected date: 05/31/2023    Iron deficiency anemia due to chronic blood loss  Comments:  To be evaluated further by Nephrology concerning the iron-deficiency anemia.  Orders:  -     ERYTHROPOIETIN; Future; Expected date: 05/31/2023  -     Iron and TIBC; Future; Expected date: 05/31/2023  -     Ferritin; Future; Expected date: 05/31/2023          Medication List with Changes/Refills   Current Medications    AMLODIPINE (NORVASC) 10 MG TABLET    Take 1 tablet by mouth once daily.    ASPIRIN (ECOTRIN) 81 MG EC TABLET    Take 81 mg by mouth once daily.    CYANOCOBALAMIN (VITAMIN B-12) 1000 MCG TABLET    Take 500 mcg by mouth once daily.     METOPROLOL TARTRATE (LOPRESSOR) 25 MG TABLET    TAKE 1 TABLET BY MOUTH TWICE A DAY    MIRTAZAPINE (REMERON SOL-TAB) 15 MG DISINTEGRATING TABLET    Take 1 tablet by mouth every evening.    TRIAMTERENE-HYDROCHLOROTHIAZIDE 37.5-25 MG (DYAZIDE) 37.5-25 MG PER CAPSULE    Take 1 capsule by mouth every morning.   Discontinued Medications    AMLODIPINE (NORVASC) 10 MG TABLET    Take 10 mg by mouth.    VALSARTAN (DIOVAN) 80 MG TABLET    Take 1 tablet by mouth once daily.    VALSARTAN (DIOVAN) 80 MG TABLET    Take 80 mg by mouth.

## 2023-05-31 NOTE — PATIENT INSTRUCTIONS
Ask your pharmacist about the tetanus booster, COVID-19 vaccine series, shingles vaccine and pneumococcal vaccine PCV 20.

## 2023-06-05 LAB — EPO SERPL-ACNC: 12.5 MIU/ML (ref 2.6–18.5)

## 2023-07-19 ENCOUNTER — OFFICE VISIT (OUTPATIENT)
Dept: NEPHROLOGY | Facility: CLINIC | Age: 88
End: 2023-07-19
Payer: MEDICARE

## 2023-07-19 VITALS
HEART RATE: 60 BPM | SYSTOLIC BLOOD PRESSURE: 100 MMHG | DIASTOLIC BLOOD PRESSURE: 50 MMHG | OXYGEN SATURATION: 95 % | BODY MASS INDEX: 18.87 KG/M2 | HEIGHT: 61 IN

## 2023-07-19 DIAGNOSIS — N18.31 ANEMIA IN STAGE 3A CHRONIC KIDNEY DISEASE: ICD-10-CM

## 2023-07-19 DIAGNOSIS — I25.10 CORONARY ARTERY DISEASE, UNSPECIFIED VESSEL OR LESION TYPE, UNSPECIFIED WHETHER ANGINA PRESENT, UNSPECIFIED WHETHER NATIVE OR TRANSPLANTED HEART: ICD-10-CM

## 2023-07-19 DIAGNOSIS — I10 ESSENTIAL HYPERTENSION: ICD-10-CM

## 2023-07-19 DIAGNOSIS — D63.1 ANEMIA IN STAGE 3A CHRONIC KIDNEY DISEASE: ICD-10-CM

## 2023-07-19 DIAGNOSIS — I25.9 ISCHEMIC HEART DISEASE DUE TO CORONARY ARTERY OBSTRUCTION: Primary | ICD-10-CM

## 2023-07-19 DIAGNOSIS — N18.30 STAGE 3 CHRONIC KIDNEY DISEASE, UNSPECIFIED WHETHER STAGE 3A OR 3B CKD: ICD-10-CM

## 2023-07-19 DIAGNOSIS — I48.0 PAROXYSMAL ATRIAL FIBRILLATION: ICD-10-CM

## 2023-07-19 DIAGNOSIS — I24.0 ISCHEMIC HEART DISEASE DUE TO CORONARY ARTERY OBSTRUCTION: Primary | ICD-10-CM

## 2023-07-19 PROCEDURE — 99999 PR PBB SHADOW E&M-EST. PATIENT-LVL III: CPT | Mod: PBBFAC,,, | Performed by: INTERNAL MEDICINE

## 2023-07-19 PROCEDURE — 99204 OFFICE O/P NEW MOD 45 MIN: CPT | Mod: S$PBB,,, | Performed by: INTERNAL MEDICINE

## 2023-07-19 PROCEDURE — 99213 OFFICE O/P EST LOW 20 MIN: CPT | Mod: PBBFAC,PN | Performed by: INTERNAL MEDICINE

## 2023-07-19 PROCEDURE — 99999 PR PBB SHADOW E&M-EST. PATIENT-LVL III: ICD-10-PCS | Mod: PBBFAC,,, | Performed by: INTERNAL MEDICINE

## 2023-07-19 PROCEDURE — 99204 PR OFFICE/OUTPT VISIT, NEW, LEVL IV, 45-59 MIN: ICD-10-PCS | Mod: S$PBB,,, | Performed by: INTERNAL MEDICINE

## 2023-07-19 RX ORDER — AMLODIPINE BESYLATE 10 MG/1
5 TABLET ORAL DAILY
Qty: 15 TABLET | Refills: 27 | Status: SHIPPED | OUTPATIENT
Start: 2023-07-19 | End: 2024-03-01

## 2023-07-19 RX ORDER — MIRTAZAPINE 15 MG/1
15 TABLET, ORALLY DISINTEGRATING ORAL NIGHTLY
COMMUNITY
Start: 2023-07-14

## 2023-07-19 NOTE — PATIENT INSTRUCTIONS
Decrease Amlodipine to 5 mg daily  IF BP goes above 160 then take the other amlodipine    DO NOT take any of NSAIDs or pain medications listed below, instead use Tylenol as written on the bottle.   DO NOT take and of the following  ibuprofen.  naproxen.  diclofenac.  celecoxib.  mefenamic acid.  etoricoxib.  indomethacin.  high-dose aspirin (low-dose aspirin is not normally considered to be an NSAID).    Ok to take Tylenol    Low Salt diet < 2000 mg per day

## 2023-07-19 NOTE — PROGRESS NOTES
"Subjective:       Patient ID: Sobeida Mcclain is a 87 y.o. White female who presents for initial evaluation of elevated sr cr referred by PCP, dr Ames.     Renal function per chart review with sr cr baseline of 1.1 - 1.2 mg/dL no eval for proteinuria.    - HTN. Patient reports good adherence to the current regiment, which includes amlodipine, metoprolol. They are following low salt diet. Never hospitalized for uncontrolled HTN or hypotension/syncopal episodes.    Denies use of NSAIDs, nephrolithiasis or fam Hx of renal disease.      Review of Systems   Constitutional:  Negative for chills, fatigue and fever.   HENT:  Negative for hearing loss, trouble swallowing and voice change.    Respiratory:  Negative for cough, shortness of breath and wheezing.    Cardiovascular:  Negative for chest pain, palpitations and leg swelling.   Gastrointestinal:  Negative for abdominal distention, abdominal pain, constipation and diarrhea.   Endocrine: Negative for polydipsia, polyphagia and polyuria.   Genitourinary:  Negative for dysuria, flank pain, frequency and hematuria.   Musculoskeletal:  Negative for arthralgias, back pain and myalgias.   Skin:  Negative for rash and wound.   Neurological:  Negative for dizziness, syncope, weakness and light-headedness.   Hematological:  Negative for adenopathy. Does not bruise/bleed easily.     The past medical, family and social histories were reviewed for this encounter.     BP (!) 100/50 (BP Location: Left arm, Patient Position: Sitting, BP Method: Small (Manual))   Pulse 60   Ht 5' 1" (1.549 m)   SpO2 95%   BMI 18.87 kg/m²     Objective:      Physical Exam  Vitals and nursing note reviewed.   Constitutional:       Appearance: Normal appearance. She is not ill-appearing.   HENT:      Head: Normocephalic and atraumatic.      Mouth/Throat:      Mouth: Mucous membranes are moist.      Pharynx: Oropharynx is clear.   Eyes:      Extraocular Movements: Extraocular movements intact.      " Conjunctiva/sclera: Conjunctivae normal.   Neck:      Vascular: No carotid bruit.   Cardiovascular:      Rate and Rhythm: Normal rate and regular rhythm.      Heart sounds: Normal heart sounds.   Pulmonary:      Effort: Pulmonary effort is normal. No respiratory distress.      Breath sounds: Normal breath sounds.   Abdominal:      General: Bowel sounds are normal. There is no distension.      Palpations: Abdomen is soft.      Tenderness: There is no abdominal tenderness.   Musculoskeletal:         General: No swelling or tenderness. Normal range of motion.      Cervical back: Normal range of motion. No tenderness.   Lymphadenopathy:      Cervical: No cervical adenopathy.   Skin:     General: Skin is warm and dry.      Capillary Refill: Capillary refill takes less than 2 seconds.      Coloration: Skin is not jaundiced.   Neurological:      General: No focal deficit present.      Mental Status: She is alert and oriented to person, place, and time.   Psychiatric:         Mood and Affect: Mood normal.         Behavior: Behavior normal.         Judgment: Judgment normal.       Assessment:       1. 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 Stage 3 chronic kidney disease, unspecified whether stage 3a or 3b CKD    3. Essential hypertension    4. Paroxysmal atrial fibrillation    5. Coronary artery disease, unspecified vessel or lesion type, unspecified whether angina present, unspecified whether native or transplanted heart    6. Anemia in stage 3a chronic kidney disease        Plan:   Return to clinic in 6 months    CKD stage 3a in a setting of HTN and aging  Baseline creatinine is 1.1 mg/dL with pending proteinuria  Lab Results   Component Value Date    CREATININE 1.1 05/31/2023      - Euvolemic   - Pt understands importance of blood pressure  control and is aware that uncontrolled HTN leads to progression of CKD   - Complete avoidance of NSAIDs   - Low salt diet with < 2000 mg/day   - Dose adjust  medications to GFR of 45 - 60   - Avoid nephrotoxins including IV contrast    HTN   - BP tightly controlled: continue current medications, avoid hypotension and dehydration    PAF/HF/CAD   - Conservative management given pt age   - Cont metoprolol    Anemia   - stable   - Goal > 9    Med changes:   Decrease Amlodipine to 5 mg daily

## 2023-08-15 ENCOUNTER — PES CALL (OUTPATIENT)
Dept: ADMINISTRATIVE | Facility: CLINIC | Age: 88
End: 2023-08-15
Payer: MEDICARE

## 2023-08-30 ENCOUNTER — OFFICE VISIT (OUTPATIENT)
Dept: FAMILY MEDICINE | Facility: CLINIC | Age: 88
End: 2023-08-30
Payer: MEDICARE

## 2023-08-30 VITALS
OXYGEN SATURATION: 95 % | BODY MASS INDEX: 18.64 KG/M2 | DIASTOLIC BLOOD PRESSURE: 64 MMHG | HEIGHT: 61 IN | RESPIRATION RATE: 18 BRPM | HEART RATE: 58 BPM | SYSTOLIC BLOOD PRESSURE: 92 MMHG | WEIGHT: 98.75 LBS

## 2023-08-30 DIAGNOSIS — I48.0 PAROXYSMAL ATRIAL FIBRILLATION: ICD-10-CM

## 2023-08-30 DIAGNOSIS — D63.1 ANEMIA IN STAGE 2 CHRONIC KIDNEY DISEASE: ICD-10-CM

## 2023-08-30 DIAGNOSIS — I10 ESSENTIAL HYPERTENSION: Primary | ICD-10-CM

## 2023-08-30 DIAGNOSIS — K13.0 ANGULAR CHEILITIS: ICD-10-CM

## 2023-08-30 DIAGNOSIS — N18.2 ANEMIA IN STAGE 2 CHRONIC KIDNEY DISEASE: ICD-10-CM

## 2023-08-30 DIAGNOSIS — N18.30 STAGE 3 CHRONIC KIDNEY DISEASE, UNSPECIFIED WHETHER STAGE 3A OR 3B CKD: ICD-10-CM

## 2023-08-30 PROCEDURE — 99214 OFFICE O/P EST MOD 30 MIN: CPT | Mod: S$PBB,,, | Performed by: FAMILY MEDICINE

## 2023-08-30 PROCEDURE — 99999 PR PBB SHADOW E&M-EST. PATIENT-LVL III: CPT | Mod: PBBFAC,,, | Performed by: FAMILY MEDICINE

## 2023-08-30 PROCEDURE — 99213 OFFICE O/P EST LOW 20 MIN: CPT | Mod: PBBFAC,PO | Performed by: FAMILY MEDICINE

## 2023-08-30 PROCEDURE — 99999 PR PBB SHADOW E&M-EST. PATIENT-LVL III: ICD-10-PCS | Mod: PBBFAC,,, | Performed by: FAMILY MEDICINE

## 2023-08-30 PROCEDURE — 99214 PR OFFICE/OUTPT VISIT, EST, LEVL IV, 30-39 MIN: ICD-10-PCS | Mod: S$PBB,,, | Performed by: FAMILY MEDICINE

## 2023-08-30 RX ORDER — MUPIROCIN 20 MG/G
OINTMENT TOPICAL 3 TIMES DAILY
Qty: 30 G | Refills: 1 | Status: SHIPPED | OUTPATIENT
Start: 2023-08-30 | End: 2024-01-11

## 2023-08-30 NOTE — PROGRESS NOTES
"Subjective:       Patient ID: Sobeida Mcclain is a 88 y.o. female.    Chief Complaint: Follow-up    HPI:  Pleasant 88-year-old patient here with her daughter Virginia.  She is here for follow-up.  She is been to see Nephrology.  Her CKD is stable at stage III.  Nothing needs to be done.  Patient has had atrial fibrillation in the past but is on no anticoagulants.  She is followed by Cardiology.  She did have a loop recorder but that no longer works.  She is in normal sinus rhythm today clinically.      Recent laboratories revealed in anemia that has been there for least the past 2 years.  It appears to be mildly iron related however she is unable will not take iron pill is recommended with vitamin-C pill.  We will continue to monitor for now.  I suspect anemia of chronic disease and not chronic blood loss from the intestinal tract.    Blood pressure is under excellent control right now continue current regimen.  Follow up    Review of Systems   Constitutional: Negative.    HENT: Negative.     Eyes: Negative.    Respiratory: Negative.     Cardiovascular: Negative.    Gastrointestinal: Negative.    Endocrine: Negative.    Genitourinary: Negative.    Musculoskeletal: Negative.    Skin: Negative.    Allergic/Immunologic: Negative.    Neurological: Negative.    Hematological: Negative.    Psychiatric/Behavioral: Negative.         Objective:      Vitals:    08/30/23 1402   BP: 92/64   Pulse: (!) 58   Resp: 18   SpO2: 95%   Weight: 44.8 kg (98 lb 12.3 oz)   Height: 5' 1" (1.549 m)      Physical Exam  Vitals and nursing note reviewed.   Constitutional:       Appearance: Normal appearance. She is normal weight.   HENT:      Head: Normocephalic and atraumatic.      Nose: Nose normal.      Mouth/Throat:      Mouth: Mucous membranes are moist.      Pharynx: Oropharynx is clear.   Eyes:      Extraocular Movements: Extraocular movements intact.      Conjunctiva/sclera: Conjunctivae normal.      Pupils: Pupils are equal, round, and " reactive to light.   Cardiovascular:      Rate and Rhythm: Normal rate and regular rhythm.      Pulses: Normal pulses.      Heart sounds: Normal heart sounds.   Pulmonary:      Effort: Pulmonary effort is normal.      Breath sounds: Normal breath sounds.   Abdominal:      General: Abdomen is flat. Bowel sounds are normal.      Palpations: Abdomen is soft.   Musculoskeletal:         General: Normal range of motion.      Cervical back: Normal range of motion and neck supple.   Skin:     General: Skin is warm and dry.      Capillary Refill: Capillary refill takes less than 2 seconds.   Neurological:      General: No focal deficit present.      Mental Status: She is alert and oriented to person, place, and time. Mental status is at baseline.   Psychiatric:         Mood and Affect: Mood normal.         Behavior: Behavior normal.         Thought Content: Thought content normal.         Judgment: Judgment normal.         Results for orders placed or performed in visit on 05/31/23   CBC Auto Differential   Result Value Ref Range    WBC 7.39 3.90 - 12.70 K/uL    RBC 3.27 (L) 4.00 - 5.40 M/uL    Hemoglobin 10.4 (L) 12.0 - 16.0 g/dL    Hematocrit 31.8 (L) 37.0 - 48.5 %    MCV 97 82 - 98 fL    MCH 31.8 (H) 27.0 - 31.0 pg    MCHC 32.7 32.0 - 36.0 g/dL    RDW 12.6 11.5 - 14.5 %    Platelets 248 150 - 450 K/uL    MPV 10.4 9.2 - 12.9 fL    Immature Granulocytes 0.1 0.0 - 0.5 %    Gran # (ANC) 4.5 1.8 - 7.7 K/uL    Immature Grans (Abs) 0.01 0.00 - 0.04 K/uL    Lymph # 1.8 1.0 - 4.8 K/uL    Mono # 0.8 0.3 - 1.0 K/uL    Eos # 0.3 0.0 - 0.5 K/uL    Baso # 0.04 0.00 - 0.20 K/uL    nRBC 0 0 /100 WBC    Gran % 61.5 38.0 - 73.0 %    Lymph % 23.7 18.0 - 48.0 %    Mono % 10.1 4.0 - 15.0 %    Eosinophil % 4.1 0.0 - 8.0 %    Basophil % 0.5 0.0 - 1.9 %    Differential Method Automated    Comprehensive Metabolic Panel   Result Value Ref Range    Sodium 139 136 - 145 mmol/L    Potassium 4.0 3.5 - 5.1 mmol/L    Chloride 104 95 - 110 mmol/L    CO2  25 23 - 29 mmol/L    Glucose 73 70 - 110 mg/dL    BUN 22 8 - 23 mg/dL    Creatinine 1.1 0.5 - 1.4 mg/dL    Calcium 9.7 8.7 - 10.5 mg/dL    Total Protein 7.4 6.0 - 8.4 g/dL    Albumin 3.7 3.5 - 5.2 g/dL    Total Bilirubin 0.4 0.1 - 1.0 mg/dL    Alkaline Phosphatase 66 55 - 135 U/L    AST 14 10 - 40 U/L    ALT 7 (L) 10 - 44 U/L    Anion Gap 10 8 - 16 mmol/L    eGFR 49.2 (A) >60 mL/min/1.73 m^2   Lipid Panel   Result Value Ref Range    Cholesterol 160 120 - 199 mg/dL    Triglycerides 82 30 - 150 mg/dL    HDL 47 40 - 75 mg/dL    LDL Cholesterol 96.6 63.0 - 159.0 mg/dL    HDL/Cholesterol Ratio 29.4 20.0 - 50.0 %    Total Cholesterol/HDL Ratio 3.4 2.0 - 5.0    Non-HDL Cholesterol 113 mg/dL   ERYTHROPOIETIN   Result Value Ref Range    Erythropoietin 12.5 2.6 - 18.5 mIU/mL   Iron and TIBC   Result Value Ref Range    Iron 34 30 - 160 ug/dL    Transferrin 187 (L) 200 - 375 mg/dL    TIBC 277 250 - 450 ug/dL    Saturated Iron 12 (L) 20 - 50 %   Ferritin   Result Value Ref Range    Ferritin 430 (H) 20.0 - 300.0 ng/mL      Assessment:       1. Essential hypertension    2. Stage 3 chronic kidney disease, unspecified whether stage 3a or 3b CKD    3. Paroxysmal atrial fibrillation    4. Anemia in stage 2 chronic kidney disease    5. Angular cheilitis        Plan:       Essential hypertension  Comments:  Controlled continue current therapy.    Stage 3 chronic kidney disease, unspecified whether stage 3a or 3b CKD  Comments:  Continue with Nephrology surveillance.    Paroxysmal atrial fibrillation  Comments:  Continue with Cardiology oversight.  No anticoagulation recommended.    Anemia in stage 2 chronic kidney disease  Comments:  Continue with normal intake of iron and vitamin-C as able.  We will continue to monitor counts.    Angular cheilitis  -     mupirocin (BACTROBAN) 2 % ointment; Apply topically 3 (three) times daily.  Dispense: 30 g; Refill: 1          Medication List with Changes/Refills   New Medications    MUPIROCIN  (BACTROBAN) 2 % OINTMENT    Apply topically 3 (three) times daily.   Current Medications    AMLODIPINE (NORVASC) 10 MG TABLET    Take 0.5 tablets (5 mg total) by mouth once daily.    ASPIRIN (ECOTRIN) 81 MG EC TABLET    Take 81 mg by mouth once daily.    CYANOCOBALAMIN (VITAMIN B-12) 1000 MCG TABLET    Take 500 mcg by mouth once daily.     METOPROLOL TARTRATE (LOPRESSOR) 25 MG TABLET    TAKE 1 TABLET BY MOUTH TWICE A DAY    MIRTAZAPINE (REMERON SOL-TAB) 15 MG DISINTEGRATING TABLET    Take 15 mg by mouth every evening.

## 2023-08-30 NOTE — PATIENT INSTRUCTIONS
Recommend you talk to her pharmacist about the shingles vaccine, the tetanus booster Tdap, the COVID-19 vaccine series if he would like, pneumococcal vaccine PCV 20 just left to go for that.

## 2023-12-03 DIAGNOSIS — Z71.89 COMPLEX CARE COORDINATION: ICD-10-CM

## 2024-01-09 ENCOUNTER — TELEPHONE (OUTPATIENT)
Dept: NEPHROLOGY | Facility: CLINIC | Age: 89
End: 2024-01-09
Payer: MEDICARE

## 2024-01-09 ENCOUNTER — TELEPHONE (OUTPATIENT)
Dept: ADMINISTRATIVE | Facility: CLINIC | Age: 89
End: 2024-01-09
Payer: MEDICARE

## 2024-01-11 ENCOUNTER — OFFICE VISIT (OUTPATIENT)
Dept: FAMILY MEDICINE | Facility: CLINIC | Age: 89
End: 2024-01-11
Payer: MEDICARE

## 2024-01-11 ENCOUNTER — LAB VISIT (OUTPATIENT)
Dept: LAB | Facility: HOSPITAL | Age: 89
End: 2024-01-11
Attending: INTERNAL MEDICINE
Payer: MEDICARE

## 2024-01-11 VITALS
DIASTOLIC BLOOD PRESSURE: 56 MMHG | OXYGEN SATURATION: 96 % | WEIGHT: 102.31 LBS | BODY MASS INDEX: 19.32 KG/M2 | HEART RATE: 67 BPM | HEIGHT: 61 IN | SYSTOLIC BLOOD PRESSURE: 114 MMHG

## 2024-01-11 DIAGNOSIS — I25.10 CORONARY ARTERY DISEASE, UNSPECIFIED VESSEL OR LESION TYPE, UNSPECIFIED WHETHER ANGINA PRESENT, UNSPECIFIED WHETHER NATIVE OR TRANSPLANTED HEART: ICD-10-CM

## 2024-01-11 DIAGNOSIS — Z00.00 ENCOUNTER FOR PREVENTIVE HEALTH EXAMINATION: Primary | ICD-10-CM

## 2024-01-11 DIAGNOSIS — I24.0 ISCHEMIC HEART DISEASE DUE TO CORONARY ARTERY OBSTRUCTION: ICD-10-CM

## 2024-01-11 DIAGNOSIS — N18.31 ANEMIA IN STAGE 3A CHRONIC KIDNEY DISEASE: ICD-10-CM

## 2024-01-11 DIAGNOSIS — D63.1 ANEMIA IN STAGE 3A CHRONIC KIDNEY DISEASE: ICD-10-CM

## 2024-01-11 DIAGNOSIS — I73.9 PERIPHERAL VASCULAR DISEASE: ICD-10-CM

## 2024-01-11 DIAGNOSIS — I25.9 ISCHEMIC HEART DISEASE DUE TO CORONARY ARTERY OBSTRUCTION: ICD-10-CM

## 2024-01-11 DIAGNOSIS — I48.0 PAROXYSMAL ATRIAL FIBRILLATION: ICD-10-CM

## 2024-01-11 DIAGNOSIS — I70.223 ATHEROSCLEROSIS OF NATIVE ARTERIES OF EXTREMITIES WITH REST PAIN, BILATERAL LEGS: ICD-10-CM

## 2024-01-11 DIAGNOSIS — N18.30 STAGE 3 CHRONIC KIDNEY DISEASE, UNSPECIFIED WHETHER STAGE 3A OR 3B CKD: ICD-10-CM

## 2024-01-11 DIAGNOSIS — I73.9 CLAUDICATION OF BOTH LOWER EXTREMITIES: ICD-10-CM

## 2024-01-11 DIAGNOSIS — I10 ESSENTIAL HYPERTENSION: ICD-10-CM

## 2024-01-11 PROBLEM — I77.9 CAROTID ARTERY DISEASE: Status: ACTIVE | Noted: 2022-06-01

## 2024-01-11 LAB
ALBUMIN SERPL BCP-MCNC: 3.6 G/DL (ref 3.5–5.2)
ANION GAP SERPL CALC-SCNC: 8 MMOL/L (ref 8–16)
BACTERIA #/AREA URNS HPF: ABNORMAL /HPF
BASOPHILS # BLD AUTO: 0.04 K/UL (ref 0–0.2)
BASOPHILS NFR BLD: 0.7 % (ref 0–1.9)
BILIRUB UR QL STRIP: NEGATIVE
BUN SERPL-MCNC: 19 MG/DL (ref 8–23)
CALCIUM SERPL-MCNC: 9.6 MG/DL (ref 8.7–10.5)
CHLORIDE SERPL-SCNC: 105 MMOL/L (ref 95–110)
CLARITY UR: ABNORMAL
CO2 SERPL-SCNC: 25 MMOL/L (ref 23–29)
COLOR UR: YELLOW
CREAT SERPL-MCNC: 1.2 MG/DL (ref 0.5–1.4)
DIFFERENTIAL METHOD BLD: ABNORMAL
EOSINOPHIL # BLD AUTO: 0.3 K/UL (ref 0–0.5)
EOSINOPHIL NFR BLD: 5.3 % (ref 0–8)
ERYTHROCYTE [DISTWIDTH] IN BLOOD BY AUTOMATED COUNT: 12.7 % (ref 11.5–14.5)
EST. GFR  (NO RACE VARIABLE): 44.1 ML/MIN/1.73 M^2
GLUCOSE SERPL-MCNC: 88 MG/DL (ref 70–110)
GLUCOSE UR QL STRIP: NEGATIVE
HCT VFR BLD AUTO: 33 % (ref 37–48.5)
HGB BLD-MCNC: 10.8 G/DL (ref 12–16)
HGB UR QL STRIP: ABNORMAL
HYALINE CASTS #/AREA URNS LPF: 0 /LPF
IMM GRANULOCYTES # BLD AUTO: 0.01 K/UL (ref 0–0.04)
IMM GRANULOCYTES NFR BLD AUTO: 0.2 % (ref 0–0.5)
KETONES UR QL STRIP: NEGATIVE
LEUKOCYTE ESTERASE UR QL STRIP: ABNORMAL
LYMPHOCYTES # BLD AUTO: 2.2 K/UL (ref 1–4.8)
LYMPHOCYTES NFR BLD: 38.6 % (ref 18–48)
MCH RBC QN AUTO: 31.1 PG (ref 27–31)
MCHC RBC AUTO-ENTMCNC: 32.7 G/DL (ref 32–36)
MCV RBC AUTO: 95 FL (ref 82–98)
MICROSCOPIC COMMENT: ABNORMAL
MONOCYTES # BLD AUTO: 0.6 K/UL (ref 0.3–1)
MONOCYTES NFR BLD: 9.6 % (ref 4–15)
NEUTROPHILS # BLD AUTO: 2.7 K/UL (ref 1.8–7.7)
NEUTROPHILS NFR BLD: 45.6 % (ref 38–73)
NITRITE UR QL STRIP: NEGATIVE
NRBC BLD-RTO: 0 /100 WBC
PH UR STRIP: 7 [PH] (ref 5–8)
PHOSPHATE SERPL-MCNC: 3.3 MG/DL (ref 2.7–4.5)
PLATELET # BLD AUTO: 267 K/UL (ref 150–450)
PMV BLD AUTO: 10 FL (ref 9.2–12.9)
POTASSIUM SERPL-SCNC: 4.1 MMOL/L (ref 3.5–5.1)
PROT UR QL STRIP: ABNORMAL
PTH-INTACT SERPL-MCNC: 110.6 PG/ML (ref 9–77)
RBC # BLD AUTO: 3.47 M/UL (ref 4–5.4)
RBC #/AREA URNS HPF: 5 /HPF (ref 0–4)
SODIUM SERPL-SCNC: 138 MMOL/L (ref 136–145)
SP GR UR STRIP: 1.02 (ref 1–1.03)
SQUAMOUS #/AREA URNS HPF: 2 /HPF
URN SPEC COLLECT METH UR: ABNORMAL
WBC # BLD AUTO: 5.81 K/UL (ref 3.9–12.7)
WBC #/AREA URNS HPF: 10 /HPF (ref 0–5)

## 2024-01-11 PROCEDURE — 81000 URINALYSIS NONAUTO W/SCOPE: CPT | Mod: PO | Performed by: INTERNAL MEDICINE

## 2024-01-11 PROCEDURE — 99214 OFFICE O/P EST MOD 30 MIN: CPT | Mod: PBBFAC,PO | Performed by: NURSE PRACTITIONER

## 2024-01-11 PROCEDURE — 85025 COMPLETE CBC W/AUTO DIFF WBC: CPT | Performed by: INTERNAL MEDICINE

## 2024-01-11 PROCEDURE — G0439 PPPS, SUBSEQ VISIT: HCPCS | Mod: ,,, | Performed by: NURSE PRACTITIONER

## 2024-01-11 PROCEDURE — 80069 RENAL FUNCTION PANEL: CPT | Performed by: INTERNAL MEDICINE

## 2024-01-11 PROCEDURE — 99999 PR PBB SHADOW E&M-EST. PATIENT-LVL IV: CPT | Mod: PBBFAC,,, | Performed by: NURSE PRACTITIONER

## 2024-01-11 PROCEDURE — 83970 ASSAY OF PARATHORMONE: CPT | Performed by: INTERNAL MEDICINE

## 2024-01-11 PROCEDURE — 36415 COLL VENOUS BLD VENIPUNCTURE: CPT | Mod: PO | Performed by: INTERNAL MEDICINE

## 2024-01-11 PROCEDURE — 84156 ASSAY OF PROTEIN URINE: CPT | Performed by: INTERNAL MEDICINE

## 2024-01-11 NOTE — PROGRESS NOTES
"  Sobeida Mcclain presented for a  Medicare AWV and comprehensive Health Risk Assessment today. The following components were reviewed and updated:    Medical history  Family History  Social history  Allergies and Current Medications  Health Risk Assessment  Health Maintenance  Care Team     ** See Completed Assessments for Annual Wellness Visit within the encounter summary.**     The following assessments were completed:  Living Situation  CAGE  Depression Screening  Timed Get Up and Go  Whisper Test  Cognitive Function Screening    Nutrition Screening  ADL Screening  PAQ Screening    Review for Opioid Screening: Pt does not have Rx for Opioids  Review for Substance Use Disorders: Patient does not use substance      Vitals:    01/11/24 0900   BP: (!) 114/56   BP Location: Left arm   Patient Position: Sitting   BP Method: Medium (Manual)   Pulse: 67   SpO2: 96%   Weight: 46.4 kg (102 lb 4.7 oz)   Height: 5' 1" (1.549 m)     Body mass index is 19.33 kg/m².  Physical Exam  Vitals reviewed.   Constitutional:       General: She is not in acute distress.  Pulmonary:      Effort: Pulmonary effort is normal. No respiratory distress.   Neurological:      Mental Status: She is alert and oriented to person, place, and time.   Psychiatric:         Mood and Affect: Mood normal.         Behavior: Behavior normal.         Thought Content: Thought content normal.         Judgment: Judgment normal.     Diagnoses and health risks identified today and associated recommendations/orders:    1. Encounter for preventive health examination  Reviewed and discussed health maintenance.    Declined vaccines    2. Peripheral vascular disease  Stable- continue current treatment and follow up routinely with PCP and cardiology ()    3. Paroxysmal atrial fibrillation  Stable- continue current treatment and follow up routinely with PCP and cardiology ()    4. Claudication of both lower extremities  Stable- continue current treatment " and follow up routinely with PCP and cardiology ()    5. 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 Essential hypertension  Stable- continue current treatment and follow up routinely with PCP and cardiology ()    7. Coronary artery disease, unspecified vessel or lesion type, unspecified whether angina present, unspecified whether native or transplanted heart  Stable- continue current treatment and follow up routinely with PCP and cardiology ()    8. Stage 3 chronic kidney disease, unspecified whether stage 3a or 3b CKD  Stable- continue current treatment and follow up routinely with PCP and nephrology (/)  NSAIDS and increase fluids    9. Anemia in stage 3a chronic kidney disease  Stable- continue current treatment and follow up routinely with PCP and nephrology (/)  NSAIDS and increase fluids  Hem/onc follow up as needed ()    Provided Sobeida with a 5-10 year written screening schedule and personal prevention plan. Recommendations were developed using the USPSTF age appropriate recommendations. Education, counseling, and referrals were provided as needed. After Visit Summary printed and given to patient which includes a list of additional screenings\tests needed.    I offered to discuss end of life issues, including information on how to make advance directives that the patient could use to name someone who would make medical decisions on their behalf if they became too ill to make themselves.  _X_Patient declined. Patient will do independently with family  Alexia Marie NP

## 2024-01-11 NOTE — PATIENT INSTRUCTIONS
Counseling and Referral of Other Preventative  (Italic type indicates deductible and co-insurance are waived)    Patient Name: Sobeida Mcclain  Today's Date: 1/11/2024    Health Maintenance       Date Due Completion Date    RSV Vaccine (Age 60+ and Pregnant patients) (1 - 1-dose 60+ series) Never done ---    Influenza Vaccine (1) Never done ---    TETANUS VACCINE 08/30/2024 (Originally 7/21/1953) ---    Shingles Vaccine (1 of 2) 08/30/2024 (Originally [birth year]/1987) ---    COVID-19 Vaccine (1) 08/30/2024 (Originally 1/21/1936) ---    Pneumococcal Vaccines (Age 65+) (1 - PCV) 08/30/2024 (Originally 7/21/2002) ---    Lipid Panel 05/31/2024 5/31/2023    Aspirin/Antiplatelet Therapy 08/30/2024 8/30/2023        No orders of the defined types were placed in this encounter.    The following information is provided to all patients.  This information is to help you find resources for any of the problems found today that may be affecting your health:                  Living healthy guide: www.[facility].[place].gov      Understanding Diabetes: www.diabetes.org      Eating healthy: www.cdc.gov/healthyweight      CDC home safety checklist: www.cdc.gov/steadi/patient.html      Agency on Aging: www.goea.[place].[facility]      Alcoholics anonymous (AA): www.aa.org      Physical Activity: www.quang.nih.gov/mk4yjjk      Tobacco use: www.quitwithusla.org

## 2024-01-12 LAB
CREAT UR-MCNC: 72 MG/DL (ref 15–325)
PROT UR-MCNC: 67 MG/DL (ref 0–15)
PROT/CREAT UR: 0.93 MG/G{CREAT} (ref 0–0.2)

## 2024-03-01 ENCOUNTER — HOSPITAL ENCOUNTER (OUTPATIENT)
Dept: RADIOLOGY | Facility: HOSPITAL | Age: 89
Discharge: HOME | End: 2024-03-01
Attending: FAMILY MEDICINE
Payer: MEDICARE

## 2024-03-01 ENCOUNTER — OFFICE VISIT (OUTPATIENT)
Dept: FAMILY MEDICINE | Facility: CLINIC | Age: 89
End: 2024-03-01
Payer: MEDICARE

## 2024-03-01 VITALS
DIASTOLIC BLOOD PRESSURE: 60 MMHG | BODY MASS INDEX: 20.44 KG/M2 | OXYGEN SATURATION: 99 % | TEMPERATURE: 98 F | HEART RATE: 71 BPM | WEIGHT: 108.25 LBS | SYSTOLIC BLOOD PRESSURE: 172 MMHG | HEIGHT: 61 IN

## 2024-03-01 DIAGNOSIS — N18.30 STAGE 3 CHRONIC KIDNEY DISEASE, UNSPECIFIED WHETHER STAGE 3A OR 3B CKD: ICD-10-CM

## 2024-03-01 DIAGNOSIS — M17.12 PRIMARY OSTEOARTHRITIS OF LEFT KNEE: ICD-10-CM

## 2024-03-01 DIAGNOSIS — I73.9 PERIPHERAL VASCULAR DISEASE: ICD-10-CM

## 2024-03-01 DIAGNOSIS — I10 ESSENTIAL HYPERTENSION: Primary | ICD-10-CM

## 2024-03-01 PROCEDURE — 99999 PR PBB SHADOW E&M-EST. PATIENT-LVL IV: CPT | Mod: PBBFAC,,, | Performed by: FAMILY MEDICINE

## 2024-03-01 PROCEDURE — 99214 OFFICE O/P EST MOD 30 MIN: CPT | Mod: PBBFAC,PO,25 | Performed by: FAMILY MEDICINE

## 2024-03-01 PROCEDURE — 99214 OFFICE O/P EST MOD 30 MIN: CPT | Mod: S$PBB,,, | Performed by: FAMILY MEDICINE

## 2024-03-01 PROCEDURE — 73560 X-RAY EXAM OF KNEE 1 OR 2: CPT | Mod: 26,50,, | Performed by: RADIOLOGY

## 2024-03-01 PROCEDURE — 73560 X-RAY EXAM OF KNEE 1 OR 2: CPT | Mod: TC,50,FY,PO

## 2024-03-01 RX ORDER — AMLODIPINE BESYLATE 5 MG/1
5 TABLET ORAL
COMMUNITY
Start: 2023-12-21

## 2024-03-01 NOTE — Clinical Note
The patient was signed up for digital hypertension but she has not getting any communication.  I have informed her and her daughter that some one from DM should be calling her.

## 2024-03-01 NOTE — PATIENT INSTRUCTIONS
Contact Nephrology and set an appointment up for about 3 months to be seen there for your kidneys.    I am going to contact the [employer] program for hypertension for which he had been signed up but they are not contact you.

## 2024-03-01 NOTE — PROGRESS NOTES
Subjective:       Patient ID: Sobeida Mcclain is a 88 y.o. female.    Chief Complaint: Follow-up and Hypertension    HPI:  Pleasant 88-year-old patient here today with her daughter Bernadette.  Bernadette helps out but as far as being the 1 that takes care of the medical issues on a day-to-day basis she has not doing that when her sister is.  She is doing well to communicate what is going on.  The patient is being seen by Nephrology for CKD 3.  She was have anemia of chronic disease namely related to EPO more than likely.  She is asymptomatic from that.  She does have a hard time swallowing pills and occasionally food and has had her esophagus dilated but it did not seem to help much.  She seems to be sustaining some muscle.  Looks like she has not dehydrated.  She does have a history of peripheral vascular disease and has had some arterial stents placed currently not complaining of claudication.  The patient is with Cardiology and sees him approximately every 6 months.  Currently no rash or urgency.  Recommend that continue along with Nephrology visits as well.  Blood pressure is elevated today because she did not take her blood pressure medicine but I am going to try to reinstitute the digital hypertension program so they can make sure she has a blood pressure cuff that works and they can make adjustments in medication to at least get her systolic less than 150.    Review of Systems   Constitutional: Negative.    HENT: Negative.     Eyes: Negative.    Respiratory: Negative.     Cardiovascular: Negative.    Gastrointestinal: Negative.    Endocrine: Negative.    Genitourinary: Negative.    Musculoskeletal: Negative.    Skin: Negative.    Allergic/Immunologic: Negative.    Neurological: Negative.    Hematological: Negative.    Psychiatric/Behavioral: Negative.         Objective:      Vitals:    03/01/24 1357   BP: (!) 172/60   Pulse: 71   Temp: 98.1 °F (36.7 °C)   TempSrc: Oral   SpO2: 99%   Weight: 49.1 kg (108 lb 3.9 oz)  "  Height: 5' 1" (1.549 m)      Physical Exam  Vitals and nursing note reviewed.   Constitutional:       Appearance: Normal appearance. She is normal weight.   HENT:      Head: Normocephalic and atraumatic.      Nose: Nose normal.      Mouth/Throat:      Mouth: Mucous membranes are moist.      Pharynx: Oropharynx is clear.   Eyes:      Extraocular Movements: Extraocular movements intact.      Conjunctiva/sclera: Conjunctivae normal.      Pupils: Pupils are equal, round, and reactive to light.   Cardiovascular:      Rate and Rhythm: Normal rate and regular rhythm.      Pulses: Normal pulses.      Heart sounds: Normal heart sounds.   Pulmonary:      Effort: Pulmonary effort is normal.      Breath sounds: Normal breath sounds.   Abdominal:      General: Abdomen is flat. Bowel sounds are normal.      Palpations: Abdomen is soft.   Musculoskeletal:         General: Normal range of motion.      Cervical back: Normal range of motion and neck supple.   Skin:     General: Skin is warm and dry.      Capillary Refill: Capillary refill takes less than 2 seconds.   Neurological:      General: No focal deficit present.      Mental Status: She is alert and oriented to person, place, and time. Mental status is at baseline.   Psychiatric:         Mood and Affect: Mood normal.         Behavior: Behavior normal.         Thought Content: Thought content normal.         Judgment: Judgment normal.         Results for orders placed or performed in visit on 01/11/24   CBC Auto Differential   Result Value Ref Range    WBC 5.81 3.90 - 12.70 K/uL    RBC 3.47 (L) 4.00 - 5.40 M/uL    Hemoglobin 10.8 (L) 12.0 - 16.0 g/dL    Hematocrit 33.0 (L) 37.0 - 48.5 %    MCV 95 82 - 98 fL    MCH 31.1 (H) 27.0 - 31.0 pg    MCHC 32.7 32.0 - 36.0 g/dL    RDW 12.7 11.5 - 14.5 %    Platelets 267 150 - 450 K/uL    MPV 10.0 9.2 - 12.9 fL    Immature Granulocytes 0.2 0.0 - 0.5 %    Gran # (ANC) 2.7 1.8 - 7.7 K/uL    Immature Grans (Abs) 0.01 0.00 - 0.04 K/uL    " Lymph # 2.2 1.0 - 4.8 K/uL    Mono # 0.6 0.3 - 1.0 K/uL    Eos # 0.3 0.0 - 0.5 K/uL    Baso # 0.04 0.00 - 0.20 K/uL    nRBC 0 0 /100 WBC    Gran % 45.6 38.0 - 73.0 %    Lymph % 38.6 18.0 - 48.0 %    Mono % 9.6 4.0 - 15.0 %    Eosinophil % 5.3 0.0 - 8.0 %    Basophil % 0.7 0.0 - 1.9 %    Differential Method Automated    Renal Function Panel   Result Value Ref Range    Glucose 88 70 - 110 mg/dL    Sodium 138 136 - 145 mmol/L    Potassium 4.1 3.5 - 5.1 mmol/L    Chloride 105 95 - 110 mmol/L    CO2 25 23 - 29 mmol/L    BUN 19 8 - 23 mg/dL    Calcium 9.6 8.7 - 10.5 mg/dL    Creatinine 1.2 0.5 - 1.4 mg/dL    Albumin 3.6 3.5 - 5.2 g/dL    Phosphorus 3.3 2.7 - 4.5 mg/dL    eGFR 44.1 (A) >60 mL/min/1.73 m^2    Anion Gap 8 8 - 16 mmol/L   PTH, Intact   Result Value Ref Range    PTH, Intact 110.6 (H) 9.0 - 77.0 pg/mL   Protein/Creatinine Ratio, Urine   Result Value Ref Range    Protein, Urine Random 67 (H) 0 - 15 mg/dL    Creatinine, Urine 72.0 15.0 - 325.0 mg/dL    Prot/Creat Ratio, Urine 0.93 (H) 0.00 - 0.20   Urinalysis   Result Value Ref Range    Specimen UA Urine, Clean Catch     Color, UA Yellow Yellow, Straw, Yancy    Appearance, UA Hazy (A) Clear    pH, UA 7.0 5.0 - 8.0    Specific Gravity, UA 1.020 1.005 - 1.030    Protein, UA 2+ (A) Negative    Glucose, UA Negative Negative    Ketones, UA Negative Negative    Bilirubin (UA) Negative Negative    Occult Blood UA 2+ (A) Negative    Nitrite, UA Negative Negative    Leukocytes, UA 2+ (A) Negative   Urinalysis Microscopic   Result Value Ref Range    RBC, UA 5 (H) 0 - 4 /hpf    WBC, UA 10 (H) 0 - 5 /hpf    Bacteria Few (A) None-Occ /hpf    Squam Epithel, UA 2 /hpf    Hyaline Casts, UA 0 0-1/lpf /lpf    Microscopic Comment SEE COMMENT       Assessment:       1. Essential hypertension    2. Stage 3 chronic kidney disease, unspecified whether stage 3a or 3b CKD    3. Peripheral vascular disease    4. Primary osteoarthritis of left knee        Plan:       Essential  hypertension  Comments:  Continue with current.  Digital medicine hypertension contacted.    Stage 3 chronic kidney disease, unspecified whether stage 3a or 3b CKD  Comments:  Continue with Nephrology oversight.    Peripheral vascular disease  Comments:  Status post stents to her extremities lower extremities.  Stable.    Primary osteoarthritis of left knee  Comments:  Referral to orthopedic surgery.  Orders:  -     X-Ray Knee 1 or 2 View Bilateral; Future; Expected date: 03/01/2024  -     Ambulatory referral/consult to Orthopedics; Future; Expected date: 03/08/2024          Medication List with Changes/Refills   Current Medications    AMLODIPINE (NORVASC) 5 MG TABLET    Take 5 mg by mouth.    ASPIRIN (ECOTRIN) 81 MG EC TABLET    Take 81 mg by mouth once daily.    CYANOCOBALAMIN (VITAMIN B-12) 1000 MCG TABLET    Take 500 mcg by mouth once daily.     METOPROLOL TARTRATE (LOPRESSOR) 25 MG TABLET    TAKE 1 TABLET BY MOUTH TWICE A DAY    MIRTAZAPINE (REMERON SOL-TAB) 15 MG DISINTEGRATING TABLET    Take 15 mg by mouth every evening.   Discontinued Medications    AMLODIPINE (NORVASC) 10 MG TABLET    Take 0.5 tablets (5 mg total) by mouth once daily.

## 2024-03-15 ENCOUNTER — TELEPHONE (OUTPATIENT)
Dept: FAMILY MEDICINE | Facility: CLINIC | Age: 89
End: 2024-03-15
Payer: MEDICARE

## 2024-03-15 NOTE — TELEPHONE ENCOUNTER
Pt.'s daughter, Felecia, reports that pt. completed antibiotics for pneumonia.  She requests Dr. Ames place x-ray order per [place] [facility] physician's recommendation. She confirmed appt. With Dr. Ames on 3/21/24 at 1:30 pm.

## 2024-03-15 NOTE — TELEPHONE ENCOUNTER
----- Message from Seble Castaneda sent at 3/15/2024  3:31 PM CDT -----  Regarding: orders  Type:  Needs Medical Advice    Who Called: Aaliyah Rodriguez Call Back Number: [phone]      Additional Information: daughter  that pt went to er last night & was told to see pcp with an chest xray.  please call to discuss.

## 2024-03-19 NOTE — TELEPHONE ENCOUNTER
Informed pt.'s daughter that, per Dr. Ames, orders will be placed as needed during visit on 3/21/24.  She vu.

## 2024-03-21 ENCOUNTER — OFFICE VISIT (OUTPATIENT)
Dept: ORTHOPEDICS | Facility: CLINIC | Age: 89
End: 2024-03-21
Payer: MEDICARE

## 2024-03-21 ENCOUNTER — OFFICE VISIT (OUTPATIENT)
Dept: FAMILY MEDICINE | Facility: CLINIC | Age: 89
End: 2024-03-21
Payer: MEDICARE

## 2024-03-21 ENCOUNTER — HOSPITAL ENCOUNTER (OUTPATIENT)
Dept: RADIOLOGY | Facility: HOSPITAL | Age: 89
Discharge: HOME | End: 2024-03-21
Attending: FAMILY MEDICINE
Payer: MEDICARE

## 2024-03-21 VITALS
WEIGHT: 114 LBS | BODY MASS INDEX: 21.52 KG/M2 | OXYGEN SATURATION: 96 % | TEMPERATURE: 98 F | HEIGHT: 61 IN | SYSTOLIC BLOOD PRESSURE: 136 MMHG | DIASTOLIC BLOOD PRESSURE: 50 MMHG | HEART RATE: 70 BPM

## 2024-03-21 VITALS — WEIGHT: 108.25 LBS | BODY MASS INDEX: 20.44 KG/M2 | HEIGHT: 61 IN

## 2024-03-21 DIAGNOSIS — J98.4 PULMONARY LESION: ICD-10-CM

## 2024-03-21 DIAGNOSIS — G89.29 CHRONIC PATELLOFEMORAL PAIN OF LEFT KNEE: ICD-10-CM

## 2024-03-21 DIAGNOSIS — M54.50 LUMBAR BACK PAIN: Primary | ICD-10-CM

## 2024-03-21 DIAGNOSIS — M17.12 PRIMARY OSTEOARTHRITIS OF LEFT KNEE: Primary | ICD-10-CM

## 2024-03-21 DIAGNOSIS — M25.562 CHRONIC PATELLOFEMORAL PAIN OF LEFT KNEE: ICD-10-CM

## 2024-03-21 PROCEDURE — 99999 PR PBB SHADOW E&M-EST. PATIENT-LVL III: CPT | Mod: PBBFAC,,, | Performed by: NURSE PRACTITIONER

## 2024-03-21 PROCEDURE — 99999 PR PBB SHADOW E&M-EST. PATIENT-LVL III: CPT | Mod: PBBFAC,,, | Performed by: FAMILY MEDICINE

## 2024-03-21 PROCEDURE — 99213 OFFICE O/P EST LOW 20 MIN: CPT | Mod: S$PBB,,, | Performed by: NURSE PRACTITIONER

## 2024-03-21 PROCEDURE — 71046 X-RAY EXAM CHEST 2 VIEWS: CPT | Mod: 26,,, | Performed by: RADIOLOGY

## 2024-03-21 PROCEDURE — 99213 OFFICE O/P EST LOW 20 MIN: CPT | Mod: PBBFAC,25,27,PO | Performed by: NURSE PRACTITIONER

## 2024-03-21 PROCEDURE — 71046 X-RAY EXAM CHEST 2 VIEWS: CPT | Mod: TC,FY,PO

## 2024-03-21 PROCEDURE — 99214 OFFICE O/P EST MOD 30 MIN: CPT | Mod: S$PBB,,, | Performed by: FAMILY MEDICINE

## 2024-03-21 PROCEDURE — 99213 OFFICE O/P EST LOW 20 MIN: CPT | Mod: PBBFAC,PO,25 | Performed by: FAMILY MEDICINE

## 2024-03-21 NOTE — PROGRESS NOTES
Chief Complaint   Patient presents with    Left Knee - Pain       HPI:    This is a 88 y.o. who presents today complaining of left knee pain for left knee pain for several years after no known trauma. Pain is aching. She reports if she doesn't move around a lot, then she has bilateral leg and knee pain. Today she rates pain 0 out of 10. She doesn't want injections today. No numbness or tingling. No associated signs or symptoms.      Past Medical History:   Diagnosis Date    Anticoagulant long-term use     Carotid artery disease     Coronary artery disease     chest discomfort, does not take NTG    Hypertension       Past Surgical History:   Procedure Laterality Date    CARDIAC SURGERY      stent insertion    CAROTID ENDARTERECTOMY      6/15/15     SECTION, CLASSIC      COLONOSCOPY  ~    CORONARY STENT PLACEMENT      x 3. 3/2015    DILATION AND CURETTAGE OF UTERUS      3/28/2016    ENDOMETRIAL BIOPSY      ESOPHAGOGASTRODUODENOSCOPY N/A 2018    Procedure: EGD (ESOPHAGOGASTRODUODENOSCOPY);  Surgeon: Adonis Naik MD;  Location: [facility];  Service: Endoscopy;  Laterality: N/A;    tonsillectomy      TONSILLECTOMY      UPPER GASTROINTESTINAL ENDOSCOPY  2016    Dr. Naik      Current Outpatient Medications on File Prior to Visit   Medication Sig Dispense Refill    amLODIPine (NORVASC) 5 MG tablet Take 5 mg by mouth.      aspirin (ECOTRIN) 81 MG EC tablet Take 81 mg by mouth once daily.      cyanocobalamin (VITAMIN B-12) 1000 MCG tablet Take 500 mcg by mouth once daily.       metoprolol tartrate (LOPRESSOR) 25 MG tablet TAKE 1 TABLET BY MOUTH TWICE A  tablet 3    mirtazapine (REMERON SOL-TAB) 15 MG disintegrating tablet Take 15 mg by mouth every evening.       No current facility-administered medications on file prior to visit.      Review of patient's allergies indicates:   Allergen Reactions    Celestone [betamethasone sodium phosphate] Hives    Codeine      Change in mental status     Penicillins Hives    Sulfa (sulfonamide antibiotics)      Other reaction(s): UNKNOWN      Family History not pertinent   Social History     Socioeconomic History    Marital status:    Tobacco Use    Smoking status: Never    Smokeless tobacco: Never   Substance and Sexual Activity    Alcohol use: No    Drug use: No   Social History Narrative         Social Determinants of Health     Financial Resource Strain: Low Risk  (1/11/2024)    Overall Financial Resource Strain (CARDIA)     Difficulty of Paying Living Expenses: Not hard at all   Food Insecurity: No Food Insecurity (1/11/2024)    Hunger Vital Sign     Worried About Running Out of Food in the Last Year: Never true     Ran Out of Food in the Last Year: Never true   Transportation Needs: No Transportation Needs (1/11/2024)    PRAPARE - Transportation     Lack of Transportation (Medical): No     Lack of Transportation (Non-Medical): No   Physical Activity: Inactive (1/11/2024)    Exercise Vital Sign     Days of Exercise per Week: 0 days     Minutes of Exercise per Session: 0 min   Stress: Stress Concern Present (1/11/2024)    [employer] - Occupational Stress Questionnaire     Feeling of Stress : To some extent   Social Connections: Moderately Isolated (1/11/2024)    Social Connection and Isolation Panel [NHANES]     Frequency of Communication with Friends and Family: More than three times a week     Frequency of Social Gatherings with Friends and Family: Three times a week     Attends Temple Services: More than 4 times per year     Active Member of Clubs or Organizations: No     Attends Club or Organization Meetings: Never     Marital Status:    Housing Stability: Low Risk  (1/11/2024)    Housing Stability Vital Sign     Unable to Pay for Housing in the Last Year: No     Number of Places Lived in the Last Year: 1     Unstable Housing in the Last Year: No         Review of Systems:   Constitutional:  Denies fever  or chills    Eyes:  Denies change in visual acuity    HENT:  Denies nasal congestion or sore throat    Respiratory:  Denies cough or shortness of breath    Cardiovascular:  Denies chest pain or edema    GI:  Denies abdominal pain, nausea, vomiting, bloody stools or diarrhea    :  Denies dysuria    Integument:  Denies rash    Neurologic:  Denies headache, focal weakness or sensory changes    Endocrine:  Denies polyuria or polydipsia    Lymphatic:  Denies swollen glands    Psychiatric:  Denies depression or anxiety       Physical Exam:    Constitutional:  Well developed, well nourished, no acute distress, non-toxic appearance    Integument:  Well hydrated  Neurologic:  Alert & oriented x 3  Psychiatric:  Speech and behavior appropriate      Bilateral Knee Exam    Left Knee Exam   Tenderness   The patient is experiencing tenderness in the medial joint line.    Range of Motion   Flexion: abnormal     Muscle Strength   The patient has normal knee strength.    Tests   Milagros:  Medial - positive   Lachman:  Anterior - negative      Varus: negative  Valgus: negative  Patellar Apprehension: negative      Other   Erythema: absent  Sensation: normal  Pulse: present  Swelling: mild    Right Knee exam   Knee exam performed same as contralateral side and is normal      X-rays were performed 2 weeks ago, personally reviewed by me and findings discussed with the patient.  3 views of the left knee show tricompartmental degenerative changes.          Assessment & plan    Primary osteoarthritis of left knee    Chronic patellofemoral pain of left knee    We discussed IA steroid injections, physical therapy and knee brace (she has one at home); she cannot have NSAIDs due to CKD stage III.   Rest, ice, elevate and compress with ace wrap dressing as needed. If she continues or if pain worsens, discussed with the patient and daughter with her today, she can return to clinic if needed for injections.     Rtc as needed.

## 2024-03-21 NOTE — PROGRESS NOTES
"Subjective:       Patient ID: Sobeida Mcclain is a 88 y.o. female.    Chief Complaint: Follow-up ([facility] - 3/13/24)    HPI:  Pleasant 88-year-old here with her daughter Felecia who is a massage therapist.  The patient took a fall back on March 13th.  Was seen at [facility].  The patient had some severe right lower back pain along the P BM.  She was evaluated there and records are pending.  No fracture was reported.  They did say that they might have saw some type of pneumonia and she was placed on antibiotics.  She does not look sick at all today she is nontoxic.  Really no cough.    She is improving and therefore she should stay with low heat do not use ice at this time.  As long as his getting better no further workup is evaluated I think it want to chest x-ray just to show what they were looking at from a standard x-ray perspective.    Review of Systems    Objective:      Vitals:    03/21/24 1322   BP: (!) 136/50   Pulse: 70   Temp: 97.8 °F (36.6 °C)   TempSrc: Oral   SpO2: 96%   Weight: 51.7 kg (113 lb 15.7 oz)   Height: 5' 1" (1.549 m)      Physical Exam    Results for orders placed or performed in visit on 01/11/24   CBC Auto Differential   Result Value Ref Range    WBC 5.81 3.90 - 12.70 K/uL    RBC 3.47 (L) 4.00 - 5.40 M/uL    Hemoglobin 10.8 (L) 12.0 - 16.0 g/dL    Hematocrit 33.0 (L) 37.0 - 48.5 %    MCV 95 82 - 98 fL    MCH 31.1 (H) 27.0 - 31.0 pg    MCHC 32.7 32.0 - 36.0 g/dL    RDW 12.7 11.5 - 14.5 %    Platelets 267 150 - 450 K/uL    MPV 10.0 9.2 - 12.9 fL    Immature Granulocytes 0.2 0.0 - 0.5 %    Gran # (ANC) 2.7 1.8 - 7.7 K/uL    Immature Grans (Abs) 0.01 0.00 - 0.04 K/uL    Lymph # 2.2 1.0 - 4.8 K/uL    Mono # 0.6 0.3 - 1.0 K/uL    Eos # 0.3 0.0 - 0.5 K/uL    Baso # 0.04 0.00 - 0.20 K/uL    nRBC 0 0 /100 WBC    Gran % 45.6 38.0 - 73.0 %    Lymph % 38.6 18.0 - 48.0 %    Mono % 9.6 4.0 - 15.0 %    Eosinophil % 5.3 0.0 - 8.0 %    Basophil % 0.7 0.0 - 1.9 %    Differential Method Automated  "   Renal Function Panel   Result Value Ref Range    Glucose 88 70 - 110 mg/dL    Sodium 138 136 - 145 mmol/L    Potassium 4.1 3.5 - 5.1 mmol/L    Chloride 105 95 - 110 mmol/L    CO2 25 23 - 29 mmol/L    BUN 19 8 - 23 mg/dL    Calcium 9.6 8.7 - 10.5 mg/dL    Creatinine 1.2 0.5 - 1.4 mg/dL    Albumin 3.6 3.5 - 5.2 g/dL    Phosphorus 3.3 2.7 - 4.5 mg/dL    eGFR 44.1 (A) >60 mL/min/1.73 m^2    Anion Gap 8 8 - 16 mmol/L   PTH, Intact   Result Value Ref Range    PTH, Intact 110.6 (H) 9.0 - 77.0 pg/mL   Protein/Creatinine Ratio, Urine   Result Value Ref Range    Protein, Urine Random 67 (H) 0 - 15 mg/dL    Creatinine, Urine 72.0 15.0 - 325.0 mg/dL    Prot/Creat Ratio, Urine 0.93 (H) 0.00 - 0.20   Urinalysis   Result Value Ref Range    Specimen UA Urine, Clean Catch     Color, UA Yellow Yellow, Straw, Yancy    Appearance, UA Hazy (A) Clear    pH, UA 7.0 5.0 - 8.0    Specific Gravity, UA 1.020 1.005 - 1.030    Protein, UA 2+ (A) Negative    Glucose, UA Negative Negative    Ketones, UA Negative Negative    Bilirubin (UA) Negative Negative    Occult Blood UA 2+ (A) Negative    Nitrite, UA Negative Negative    Leukocytes, UA 2+ (A) Negative   Urinalysis Microscopic   Result Value Ref Range    RBC, UA 5 (H) 0 - 4 /hpf    WBC, UA 10 (H) 0 - 5 /hpf    Bacteria Few (A) None-Occ /hpf    Squam Epithel, UA 2 /hpf    Hyaline Casts, UA 0 0-1/lpf /lpf    Microscopic Comment SEE COMMENT       Assessment:       1. Lumbar back pain    2. Pulmonary lesion        Plan:       Lumbar back pain    Pulmonary lesion  -     X-Ray Chest PA And Lateral; Future; Expected date: 03/21/2024          Medication List with Changes/Refills   Current Medications    AMLODIPINE (NORVASC) 5 MG TABLET    Take 5 mg by mouth.    ASPIRIN (ECOTRIN) 81 MG EC TABLET    Take 81 mg by mouth once daily.    CYANOCOBALAMIN (VITAMIN B-12) 1000 MCG TABLET    Take 500 mcg by mouth once daily.     METOPROLOL TARTRATE (LOPRESSOR) 25 MG TABLET    TAKE 1 TABLET BY MOUTH TWICE A DAY     MIRTAZAPINE (REMERON SOL-TAB) 15 MG DISINTEGRATING TABLET    Take 15 mg by mouth every evening.

## 2024-07-03 DIAGNOSIS — Z71.89 COMPLEX CARE COORDINATION: ICD-10-CM

## 2024-09-03 ENCOUNTER — OFFICE VISIT (OUTPATIENT)
Dept: FAMILY MEDICINE | Facility: CLINIC | Age: 89
End: 2024-09-03
Payer: MEDICARE

## 2024-09-03 VITALS
DIASTOLIC BLOOD PRESSURE: 58 MMHG | WEIGHT: 108.25 LBS | SYSTOLIC BLOOD PRESSURE: 138 MMHG | OXYGEN SATURATION: 93 % | HEIGHT: 61 IN | BODY MASS INDEX: 20.44 KG/M2 | HEART RATE: 60 BPM

## 2024-09-03 DIAGNOSIS — N18.30 STAGE 3 CHRONIC KIDNEY DISEASE, UNSPECIFIED WHETHER STAGE 3A OR 3B CKD: ICD-10-CM

## 2024-09-03 DIAGNOSIS — R13.19 ESOPHAGEAL DYSPHAGIA: ICD-10-CM

## 2024-09-03 DIAGNOSIS — I10 ESSENTIAL HYPERTENSION: Primary | ICD-10-CM

## 2024-09-03 DIAGNOSIS — I25.10 CORONARY ARTERY DISEASE, UNSPECIFIED VESSEL OR LESION TYPE, UNSPECIFIED WHETHER ANGINA PRESENT, UNSPECIFIED WHETHER NATIVE OR TRANSPLANTED HEART: ICD-10-CM

## 2024-09-03 PROCEDURE — 99213 OFFICE O/P EST LOW 20 MIN: CPT | Mod: PBBFAC,PO | Performed by: FAMILY MEDICINE

## 2024-09-03 PROCEDURE — 99214 OFFICE O/P EST MOD 30 MIN: CPT | Mod: S$PBB,,, | Performed by: FAMILY MEDICINE

## 2024-09-03 PROCEDURE — 99999 PR PBB SHADOW E&M-EST. PATIENT-LVL III: CPT | Mod: PBBFAC,,, | Performed by: FAMILY MEDICINE

## 2024-09-03 RX ORDER — AMLODIPINE BESYLATE 5 MG/1
5 TABLET ORAL DAILY
Qty: 90 TABLET | Refills: 3 | Status: SHIPPED | OUTPATIENT
Start: 2024-09-03

## 2024-09-03 NOTE — PROGRESS NOTES
"Subjective:       Patient ID: Sobeida Mcclain is a 89 y.o. female.    Chief Complaint: Hypertension    HPI:  Pleasant 89-year-old patient here with her daughter for follow-up.  The patient has essential hypertension which is well controlled.  No overt symptoms of CAD or CHF.  She has a cardiologist that follows her for AFib and coronary artery disease.  She was peripheral vascular issues but no stents peripherally.  She does have car coronary stents.  She has been followed in the past by Nephrology for stage III CKD but she is stable in the mid to upper 40s on the GFR will continue to monitor that.  She has a esophageal dysphagia that has had a couple of different episodes of dilation of the esophagus but does not seem to last long.  She does not want to see Gastroenterology this time.  The patient refuses all immunizations.  We will discuss it next year and see if she was still has a say mind set.  She should return to see me in about 6 months.    Review of Systems   Constitutional: Negative.    HENT: Negative.     Eyes: Negative.    Respiratory: Negative.     Cardiovascular: Negative.    Gastrointestinal: Negative.    Endocrine: Negative.    Genitourinary: Negative.    Musculoskeletal: Negative.    Skin: Negative.    Allergic/Immunologic: Negative.    Neurological: Negative.    Hematological: Negative.    Psychiatric/Behavioral: Negative.         Objective:      Vitals:    09/03/24 0932   BP: (!) 138/58   Pulse: 60   SpO2: (!) 93%   Weight: 49.1 kg (108 lb 3.9 oz)   Height: 5' 1" (1.549 m)      Physical Exam  Vitals and nursing note reviewed.   Constitutional:       Appearance: Normal appearance. She is normal weight.   HENT:      Head: Normocephalic and atraumatic.      Nose: Nose normal.      Mouth/Throat:      Mouth: Mucous membranes are moist.      Pharynx: Oropharynx is clear.   Eyes:      Extraocular Movements: Extraocular movements intact.      Conjunctiva/sclera: Conjunctivae normal.      Pupils: Pupils are " equal, round, and reactive to light.   Cardiovascular:      Rate and Rhythm: Normal rate and regular rhythm.      Pulses: Normal pulses.      Heart sounds: Normal heart sounds.   Pulmonary:      Effort: Pulmonary effort is normal.      Breath sounds: Normal breath sounds.   Abdominal:      General: Abdomen is flat. Bowel sounds are normal.      Palpations: Abdomen is soft.   Musculoskeletal:         General: Normal range of motion.      Cervical back: Normal range of motion and neck supple.   Skin:     General: Skin is warm and dry.      Capillary Refill: Capillary refill takes less than 2 seconds.   Neurological:      General: No focal deficit present.      Mental Status: She is alert and oriented to person, place, and time. Mental status is at baseline.   Psychiatric:         Mood and Affect: Mood normal.         Behavior: Behavior normal.         Thought Content: Thought content normal.         Judgment: Judgment normal.         Results for orders placed or performed in visit on 01/11/24   CBC Auto Differential   Result Value Ref Range    WBC 5.81 3.90 - 12.70 K/uL    RBC 3.47 (L) 4.00 - 5.40 M/uL    Hemoglobin 10.8 (L) 12.0 - 16.0 g/dL    Hematocrit 33.0 (L) 37.0 - 48.5 %    MCV 95 82 - 98 fL    MCH 31.1 (H) 27.0 - 31.0 pg    MCHC 32.7 32.0 - 36.0 g/dL    RDW 12.7 11.5 - 14.5 %    Platelets 267 150 - 450 K/uL    MPV 10.0 9.2 - 12.9 fL    Immature Granulocytes 0.2 0.0 - 0.5 %    Gran # (ANC) 2.7 1.8 - 7.7 K/uL    Immature Grans (Abs) 0.01 0.00 - 0.04 K/uL    Lymph # 2.2 1.0 - 4.8 K/uL    Mono # 0.6 0.3 - 1.0 K/uL    Eos # 0.3 0.0 - 0.5 K/uL    Baso # 0.04 0.00 - 0.20 K/uL    nRBC 0 0 /100 WBC    Gran % 45.6 38.0 - 73.0 %    Lymph % 38.6 18.0 - 48.0 %    Mono % 9.6 4.0 - 15.0 %    Eosinophil % 5.3 0.0 - 8.0 %    Basophil % 0.7 0.0 - 1.9 %    Differential Method Automated    Renal Function Panel   Result Value Ref Range    Glucose 88 70 - 110 mg/dL    Sodium 138 136 - 145 mmol/L    Potassium 4.1 3.5 - 5.1 mmol/L     Chloride 105 95 - 110 mmol/L    CO2 25 23 - 29 mmol/L    BUN 19 8 - 23 mg/dL    Calcium 9.6 8.7 - 10.5 mg/dL    Creatinine 1.2 0.5 - 1.4 mg/dL    Albumin 3.6 3.5 - 5.2 g/dL    Phosphorus 3.3 2.7 - 4.5 mg/dL    eGFR 44.1 (A) >60 mL/min/1.73 m^2    Anion Gap 8 8 - 16 mmol/L   PTH, Intact   Result Value Ref Range    PTH, Intact 110.6 (H) 9.0 - 77.0 pg/mL   Protein/Creatinine Ratio, Urine   Result Value Ref Range    Protein, Urine Random 67 (H) 0 - 15 mg/dL    Creatinine, Urine 72.0 15.0 - 325.0 mg/dL    Prot/Creat Ratio, Urine 0.93 (H) 0.00 - 0.20   Urinalysis   Result Value Ref Range    Specimen UA Urine, Clean Catch     Color, UA Yellow Yellow, Straw, Ayncy    Appearance, UA Hazy (A) Clear    pH, UA 7.0 5.0 - 8.0    Specific Gravity, UA 1.020 1.005 - 1.030    Protein, UA 2+ (A) Negative    Glucose, UA Negative Negative    Ketones, UA Negative Negative    Bilirubin (UA) Negative Negative    Occult Blood UA 2+ (A) Negative    Nitrite, UA Negative Negative    Leukocytes, UA 2+ (A) Negative   Urinalysis Microscopic   Result Value Ref Range    RBC, UA 5 (H) 0 - 4 /hpf    WBC, UA 10 (H) 0 - 5 /hpf    Bacteria Few (A) None-Occ /hpf    Squam Epithel, UA 2 /hpf    Hyaline Casts, UA 0 0-1/lpf /lpf    Microscopic Comment SEE COMMENT       Assessment:       1. Essential hypertension    2. Stage 3 chronic kidney disease, unspecified whether stage 3a or 3b CKD    3. Coronary artery disease, unspecified vessel or lesion type, unspecified whether angina present, unspecified whether native or transplanted heart    4. Esophageal dysphagia        Plan:       Essential hypertension  Comments:  Stable currently stay on the same medications  Orders:  -     amLODIPine (NORVASC) 5 MG tablet; Take 1 tablet (5 mg total) by mouth once daily.  Dispense: 90 tablet; Refill: 3    Stage 3 chronic kidney disease, unspecified whether stage 3a or 3b CKD  Comments:  We will continue to monitor.  No need for Nephrology at this time suspect age-related  nephron loss.    Coronary artery disease, unspecified vessel or lesion type, unspecified whether angina present, unspecified whether native or transplanted heart  Comments:  Cardiology appointment coming up.    Esophageal dysphagia  Comments:  If the patient changes her mind and wants to see GI I am happy to refer.  No evidence of malnutrition.          Medication List with Changes/Refills   Current Medications    ASPIRIN (ECOTRIN) 81 MG EC TABLET    Take 81 mg by mouth once daily.    METOPROLOL TARTRATE (LOPRESSOR) 25 MG TABLET    TAKE 1 TABLET BY MOUTH TWICE A DAY   Changed and/or Refilled Medications    Modified Medication Previous Medication    AMLODIPINE (NORVASC) 5 MG TABLET amLODIPine (NORVASC) 5 MG tablet       Take 1 tablet (5 mg total) by mouth once daily.    Take 5 mg by mouth.   Discontinued Medications    CYANOCOBALAMIN (VITAMIN B-12) 1000 MCG TABLET    Take 500 mcg by mouth once daily.     MIRTAZAPINE (REMERON SOL-TAB) 15 MG DISINTEGRATING TABLET    Take 15 mg by mouth every evening.

## 2024-11-15 NOTE — PROGRESS NOTES
"Subjective:       Patient ID: Sobeida Mcclain is a 86 y.o. female.    Chief Complaint: Annual Exam    Here today to establish care with a new PCP.  She was a patient of Dr. Bernstein who has retired.  She is here today with her daughter.  She has active medical problems of HTN, PVD, CAD, PAF, Anemia and arthritis.  Daughter states she has lost weight (12 lbs since last visit) and does not eat much or drink much.    HTN:  She is on Lopressor 25 mg daily.  CAD/A. Fib:  She is on Beta blocker and Statin (Pravastatin 20 mg).  She is seeing Cardiology (Jorje).  She is having a stress test soon.  Anemia: she is iron def.  She has been off her iron supplement for a few months as it caused constipation.      Immunizations.  None on record and she refuses.  Last Lab work: 2022  Colon Ca screening: no longer recommended   Breast Ca Screening: [facility] 2020  Cervical Ca Screening: no longer recommended    Review of Systems   Constitutional: Positive for unexpected weight change (12 lbs). Negative for activity change, appetite change, fatigue and fever.   Respiratory: Negative for cough, shortness of breath and wheezing.    Cardiovascular: Negative for chest pain and palpitations.   Gastrointestinal: Negative for abdominal pain, constipation, diarrhea and nausea.   Skin: Negative for pallor and rash.   Neurological: Negative for dizziness, syncope, light-headedness and headaches.       Objective:      Vitals:    06/01/22 1039   BP: 110/60   BP Location: Right arm   Patient Position: Sitting   BP Method: Small (Manual)   Pulse: (!) 57   SpO2: 95%   Weight: 45.8 kg (100 lb 15.5 oz)   Height: 5' 1" (1.549 m)      Physical Exam  Constitutional:       General: She is not in acute distress.     Appearance: She is underweight.   Cardiovascular:      Rate and Rhythm: Normal rate and regular rhythm.      Heart sounds: Normal heart sounds. No murmur heard.  Pulmonary:      Effort: Pulmonary effort is normal. No respiratory distress.      Breath " Medication: carvedilol passed protocol.   Last office visit date: 6-5-24  Next appointment scheduled?: Yes   Number of refills given: 90 days    Last OV Plan of care: return in 6 months; next appointment 12-11-24  Last Refill:  12-7-23  Number of Refills Sent:  3  Quantity of Medication Given:  90 day supply     Controlled Substance: No        Date of any Lab Results pertaining to medication: None        sounds: Normal breath sounds. No wheezing, rhonchi or rales.   Neurological:      General: No focal deficit present.      Mental Status: She is alert.   Psychiatric:         Mood and Affect: Mood normal.         Behavior: Behavior normal.         Thought Content: Thought content normal.         Results for orders placed or performed in visit on 05/25/22   CBC Auto Differential   Result Value Ref Range    WBC 5.13 3.90 - 12.70 K/uL    RBC 3.34 (L) 4.00 - 5.40 M/uL    Hemoglobin 10.1 (L) 12.0 - 16.0 g/dL    Hematocrit 31.1 (L) 37.0 - 48.5 %    MCV 93 82 - 98 fL    MCH 30.2 27.0 - 31.0 pg    MCHC 32.5 32.0 - 36.0 g/dL    RDW 13.3 11.5 - 14.5 %    Platelets 327 150 - 450 K/uL    MPV 10.4 9.2 - 12.9 fL    Immature Granulocytes 0.0 0.0 - 0.5 %    Gran # (ANC) 2.4 1.8 - 7.7 K/uL    Immature Grans (Abs) 0.00 0.00 - 0.04 K/uL    Lymph # 1.9 1.0 - 4.8 K/uL    Mono # 0.5 0.3 - 1.0 K/uL    Eos # 0.4 0.0 - 0.5 K/uL    Baso # 0.02 0.00 - 0.20 K/uL    nRBC 0 0 /100 WBC    Gran % 46.1 38.0 - 73.0 %    Lymph % 36.3 18.0 - 48.0 %    Mono % 9.6 4.0 - 15.0 %    Eosinophil % 7.6 0.0 - 8.0 %    Basophil % 0.4 0.0 - 1.9 %    Differential Method Automated    Comprehensive Metabolic Panel   Result Value Ref Range    Sodium 140 136 - 145 mmol/L    Potassium 4.6 3.5 - 5.1 mmol/L    Chloride 106 95 - 110 mmol/L    CO2 25 23 - 29 mmol/L    Glucose 100 70 - 110 mg/dL    BUN 16 8 - 23 mg/dL    Creatinine 1.1 0.5 - 1.4 mg/dL    Calcium 9.4 8.7 - 10.5 mg/dL    Total Protein 6.7 6.0 - 8.4 g/dL    Albumin 3.6 3.5 - 5.2 g/dL    Total Bilirubin 0.4 0.1 - 1.0 mg/dL    Alkaline Phosphatase 76 55 - 135 U/L    AST 12 10 - 40 U/L    ALT <5 (L) 10 - 44 U/L    Anion Gap 9 8 - 16 mmol/L    eGFR if African American 53.3 (A) >60 mL/min/1.73 m^2    eGFR if non  46.2 (A) >60 mL/min/1.73 m^2   Lipid Panel   Result Value Ref Range    Cholesterol 139 120 - 199 mg/dL    Triglycerides 74 30 - 150 mg/dL    HDL 39 (L) 40 - 75 mg/dL    LDL Cholesterol 85.2  63.0 - 159.0 mg/dL    HDL/Cholesterol Ratio 28.1 20.0 - 50.0 %    Total Cholesterol/HDL Ratio 3.6 2.0 - 5.0    Non-HDL Cholesterol 100 mg/dL   Iron and TIBC   Result Value Ref Range    Iron 39 30 - 160 ug/dL    Transferrin 167 (L) 200 - 375 mg/dL    TIBC 247 (L) 250 - 450 ug/dL    Saturated Iron 16 (L) 20 - 50 %   Vitamin B12   Result Value Ref Range    Vitamin B-12 651 210 - 950 pg/mL   Ferritin   Result Value Ref Range    Ferritin 605 (H) 20.0 - 300.0 ng/mL      Assessment:       1. Essential hypertension    2. Iron deficiency anemia due to chronic blood loss    3. 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 Anemia in stage 2 chronic kidney disease    5. Paroxysmal atrial fibrillation    6. Osteoarthritis of both knees, unspecified osteoarthritis type        Plan:       Essential hypertension    Iron deficiency anemia due to chronic blood loss  -     ferrous sulfate, dried (SLOW FE) 160 mg (50 mg iron) TbSR; Take 1 tablet (160 mg total) by mouth once daily.  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  Continue current medication.  Restart Iron (Slow FE).  F/u with cardiology.  We discussed increasing calorie intake with boost/ensure.      Medication List with Changes/Refills   New Medications    FERROUS SULFATE, DRIED (SLOW FE) 160 MG (50 MG IRON) TBSR    Take 1 tablet (160 mg total) by mouth once daily.   Current Medications    ASPIRIN (ECOTRIN) 81 MG EC TABLET    Take 81 mg by mouth once daily.    CYANOCOBALAMIN (VITAMIN B-12) 1000 MCG TABLET    Take 500 mcg by mouth once daily.     METOPROLOL TARTRATE (LOPRESSOR) 25 MG TABLET    TAKE 1 TABLET BY MOUTH TWICE A DAY    PRAVASTATIN (PRAVACHOL) 20 MG TABLET    TAKE 1 TABLET BY MOUTH EVERY DAY   Discontinued Medications     FERROUS SULFATE (FEOSOL) 325 MG (65 MG IRON) TAB TABLET    TAKE 1 TABLET (325 MG TOTAL) BY MOUTH ONCE DAILY.    LORATADINE (CLARITIN) 10 MG TABLET    TAKE 1 TABLET BY MOUTH EVERY DAY AS NEEDED FOR ALLERGY    TRIAMTERENE-HYDROCHLOROTHIAZIDE 37.5-25 MG (MAXZIDE-25) 37.5-25 MG PER TABLET    TAKE 1 TABLET BY MOUTH EVERY MON, WED, FRI.

## 2025-01-13 DIAGNOSIS — Z00.00 ENCOUNTER FOR MEDICARE ANNUAL WELLNESS EXAM: ICD-10-CM

## 2025-01-14 ENCOUNTER — OFFICE VISIT (OUTPATIENT)
Dept: FAMILY MEDICINE | Facility: CLINIC | Age: 89
End: 2025-01-14
Payer: MEDICARE

## 2025-01-14 VITALS
WEIGHT: 111.31 LBS | HEIGHT: 61 IN | OXYGEN SATURATION: 98 % | SYSTOLIC BLOOD PRESSURE: 172 MMHG | DIASTOLIC BLOOD PRESSURE: 60 MMHG | BODY MASS INDEX: 21.02 KG/M2 | HEART RATE: 63 BPM

## 2025-01-14 DIAGNOSIS — N18.31 ANEMIA IN STAGE 3A CHRONIC KIDNEY DISEASE: ICD-10-CM

## 2025-01-14 DIAGNOSIS — D63.1 ANEMIA IN STAGE 3A CHRONIC KIDNEY DISEASE: ICD-10-CM

## 2025-01-14 DIAGNOSIS — R60.0 BILATERAL LOWER EXTREMITY EDEMA: ICD-10-CM

## 2025-01-14 DIAGNOSIS — L30.9 ECZEMA, UNSPECIFIED TYPE: Primary | ICD-10-CM

## 2025-01-14 DIAGNOSIS — I48.0 PAROXYSMAL ATRIAL FIBRILLATION: ICD-10-CM

## 2025-01-14 DIAGNOSIS — L03.119 CELLULITIS OF LOWER EXTREMITY, UNSPECIFIED LATERALITY: ICD-10-CM

## 2025-01-14 DIAGNOSIS — B86 SCABIES: ICD-10-CM

## 2025-01-14 PROCEDURE — 99214 OFFICE O/P EST MOD 30 MIN: CPT | Mod: S$PBB,,, | Performed by: NURSE PRACTITIONER

## 2025-01-14 PROCEDURE — 99214 OFFICE O/P EST MOD 30 MIN: CPT | Mod: PBBFAC,PO | Performed by: NURSE PRACTITIONER

## 2025-01-14 PROCEDURE — 99999 PR PBB SHADOW E&M-EST. PATIENT-LVL IV: CPT | Mod: PBBFAC,,, | Performed by: NURSE PRACTITIONER

## 2025-01-14 RX ORDER — PERMETHRIN 50 MG/G
CREAM TOPICAL
Qty: 60 G | Refills: 0 | Status: SHIPPED | OUTPATIENT
Start: 2025-01-14

## 2025-01-14 RX ORDER — CETIRIZINE HYDROCHLORIDE 10 MG/1
10 TABLET ORAL DAILY
Qty: 14 TABLET | Refills: 0 | Status: SHIPPED | OUTPATIENT
Start: 2025-01-14 | End: 2025-01-28

## 2025-01-14 RX ORDER — DOXYCYCLINE 100 MG/1
100 CAPSULE ORAL 2 TIMES DAILY
Qty: 14 CAPSULE | Refills: 0 | Status: SHIPPED | OUTPATIENT
Start: 2025-01-14 | End: 2025-01-21

## 2025-01-14 RX ORDER — AMIODARONE HYDROCHLORIDE 200 MG/1
1 TABLET ORAL DAILY
COMMUNITY
Start: 2024-10-24

## 2025-01-14 RX ORDER — PREDNISONE 10 MG/1
TABLET ORAL
Qty: 42 TABLET | Refills: 0 | Status: SHIPPED | OUTPATIENT
Start: 2025-01-14 | End: 2025-01-26

## 2025-01-14 RX ORDER — FLUOCINONIDE 0.5 MG/G
OINTMENT TOPICAL 2 TIMES DAILY PRN
Qty: 120 G | Refills: 1 | Status: SHIPPED | OUTPATIENT
Start: 2025-01-14

## 2025-01-14 NOTE — PATIENT INSTRUCTIONS
A few reminders from today:    Shower with mild unscented soap, pat dry and apply thick cream moisturizer like Eucerin to entire body.   Start meds as prescribed  US of LE today  Followup in 1 week.    Do not hesitate to get in touch with me should you have any further questions or concerns. Notify provider if symptoms worsen or do not improve.    Thank you for trusting me with your care!  I wish you health and happiness.      -Sophia Murray, JEAN PIERREC

## 2025-01-14 NOTE — PROGRESS NOTES
"Subjective     Patient ID: Sobeida Mcclain is a 89 y.o. female.    Chief Complaint: Rash (Pt states the rash started about a month ago. Pt states she is extremely itchy.) and Leg Swelling (Pt states that the rash has been causing swelling.)      HPI      Patient is new to me. PCP is Dr. Ames.     88 y/o F with HTN, PVD, CAD, PAF on Eliquis, CKD3, Anemia presents to clinic for c/o rash x 1 mo. It is very itchy, she has been using alcohol to stop the itching. She reports she does not bath often. She has swelling to bilateral lower legs with redness. She is on Eliquis for afib, reports compliance with meds. Her BP is elevated today. Denies CP, SOB, Headaches.    Review of Systems   Integumentary:  Positive for rash.          Objective   Vitals:    01/14/25 0930   BP: (!) 172/60   Pulse: 63   SpO2: 98%   Weight: 50.5 kg (111 lb 5.3 oz)   Height: 5' 1" (1.549 m)      Physical Exam  Constitutional:       General: She is not in acute distress.     Appearance: Normal appearance. She is not ill-appearing, toxic-appearing or diaphoretic.   HENT:      Head: Normocephalic and atraumatic.   Cardiovascular:      Pulses:           Dorsalis pedis pulses are 2+ on the right side and 2+ on the left side.   Pulmonary:      Effort: No respiratory distress.   Musculoskeletal:      Right lower leg: Edema present.      Left lower leg: Edema present.   Skin:     General: Skin is warm and dry.      Capillary Refill: Capillary refill takes less than 2 seconds.      Findings: Erythema (bilateral lower extremities) and rash present.      Comments: Skin is very dry, crusting skin with excoriations noted to wrists, knees, lower extremities. Signs of burrowing. Weeping on both wrists. Back with excoriations and abrasions from scratching.    Neurological:      General: No focal deficit present.      Mental Status: She is alert and oriented to person, place, and time.   Psychiatric:         Mood and Affect: Mood normal.         Behavior: Behavior " normal.         Thought Content: Thought content normal.         Judgment: Judgment normal.                       1. Eczema, unspecified type  - predniSONE (DELTASONE) 10 MG tablet; Take 6 tablets (60 mg total) by mouth once daily for 2 days, THEN 5 tablets (50 mg total) once daily for 2 days, THEN 4 tablets (40 mg total) once daily for 2 days, THEN 3 tablets (30 mg total) once daily for 2 days, THEN 2 tablets (20 mg total) once daily for 2 days, THEN 1 tablet (10 mg total) once daily for 2 days.  Dispense: 42 tablet; Refill: 0  - fluocinonide (LIDEX) 0.05 % ointment; Apply topically 2 (two) times daily as needed (itching).  Dispense: 120 g; Refill: 1  - cetirizine (ZYRTEC) 10 MG tablet; Take 1 tablet (10 mg total) by mouth once daily. for 14 days  Dispense: 14 tablet; Refill: 0    2. Cellulitis of lower extremity, unspecified laterality  - doxycycline (MONODOX) 100 MG capsule; Take 1 capsule (100 mg total) by mouth 2 (two) times daily. for 7 days  Dispense: 14 capsule; Refill: 0    3. Bilateral lower extremity edema  - US Lower Extremity Veins Bilateral; Future    4. Scabies  - permethrin (ELIMITE) 5 % cream; Apply topically from head to toes avoiding eyes once before bed, wash off after 8-14 hours.  Dispense: 60 g; Refill: 0    5. Paroxysmal atrial fibrillation  On Eliquis and amiodorone    6. Anemia in stage 3a chronic kidney disease  -plan to repeat labs at followup    RTC/ER precautions given. F/U 1 week.    Counseled on regular exercise, maintenance of a healthy weight, balanced diet rich in fruits/vegetables and lean protein, and avoidance of unhealthy habits like smoking and excessive alcohol intake.    TYLOR Pemberton

## 2025-01-23 ENCOUNTER — TELEPHONE (OUTPATIENT)
Dept: FAMILY MEDICINE | Facility: CLINIC | Age: 89
End: 2025-01-23
Payer: MEDICARE

## 2025-02-07 ENCOUNTER — HOSPITAL ENCOUNTER (OUTPATIENT)
Dept: RADIOLOGY | Facility: HOSPITAL | Age: 89
Discharge: HOME | End: 2025-02-07
Attending: NURSE PRACTITIONER
Payer: MEDICARE

## 2025-02-07 ENCOUNTER — OFFICE VISIT (OUTPATIENT)
Dept: FAMILY MEDICINE | Facility: CLINIC | Age: 89
End: 2025-02-07
Payer: MEDICARE

## 2025-02-07 VITALS
BODY MASS INDEX: 19.98 KG/M2 | SYSTOLIC BLOOD PRESSURE: 150 MMHG | HEIGHT: 61 IN | HEART RATE: 61 BPM | DIASTOLIC BLOOD PRESSURE: 56 MMHG | WEIGHT: 105.81 LBS | OXYGEN SATURATION: 95 %

## 2025-02-07 DIAGNOSIS — I87.2 STASIS DERMATITIS OF BOTH LEGS: ICD-10-CM

## 2025-02-07 DIAGNOSIS — L85.3 XEROSIS CUTIS: ICD-10-CM

## 2025-02-07 DIAGNOSIS — J30.9 ALLERGIC RHINITIS, UNSPECIFIED SEASONALITY, UNSPECIFIED TRIGGER: ICD-10-CM

## 2025-02-07 DIAGNOSIS — R13.19 ESOPHAGEAL DYSPHAGIA: ICD-10-CM

## 2025-02-07 DIAGNOSIS — R60.0 BILATERAL LOWER EXTREMITY EDEMA: ICD-10-CM

## 2025-02-07 DIAGNOSIS — I10 ESSENTIAL HYPERTENSION: Primary | ICD-10-CM

## 2025-02-07 DIAGNOSIS — J44.9 CHRONIC OBSTRUCTIVE PULMONARY DISEASE, UNSPECIFIED COPD TYPE: ICD-10-CM

## 2025-02-07 DIAGNOSIS — N18.31 STAGE 3A CHRONIC KIDNEY DISEASE: ICD-10-CM

## 2025-02-07 DIAGNOSIS — R11.0 NAUSEA: ICD-10-CM

## 2025-02-07 DIAGNOSIS — R82.90 ABNORMAL URINE: ICD-10-CM

## 2025-02-07 DIAGNOSIS — I25.10 CORONARY ARTERY DISEASE, UNSPECIFIED VESSEL OR LESION TYPE, UNSPECIFIED WHETHER ANGINA PRESENT, UNSPECIFIED WHETHER NATIVE OR TRANSPLANTED HEART: ICD-10-CM

## 2025-02-07 LAB
BACTERIA #/AREA URNS HPF: ABNORMAL /HPF
BILIRUB UR QL STRIP: NEGATIVE
CLARITY UR: CLEAR
COLOR UR: YELLOW
GLUCOSE UR QL STRIP: NEGATIVE
HGB UR QL STRIP: ABNORMAL
HYALINE CASTS #/AREA URNS LPF: 1 /LPF
KETONES UR QL STRIP: NEGATIVE
LEUKOCYTE ESTERASE UR QL STRIP: NEGATIVE
MICROSCOPIC COMMENT: ABNORMAL
NITRITE UR QL STRIP: NEGATIVE
PH UR STRIP: 6 [PH] (ref 5–8)
PROT UR QL STRIP: ABNORMAL
RBC #/AREA URNS HPF: 5 /HPF (ref 0–4)
SP GR UR STRIP: 1.02 (ref 1–1.03)
SQUAMOUS #/AREA URNS HPF: 2 /HPF
URN SPEC COLLECT METH UR: ABNORMAL
WBC #/AREA URNS HPF: 5 /HPF (ref 0–5)

## 2025-02-07 PROCEDURE — 99999 PR PBB SHADOW E&M-EST. PATIENT-LVL III: CPT | Mod: PBBFAC,,, | Performed by: NURSE PRACTITIONER

## 2025-02-07 PROCEDURE — 99213 OFFICE O/P EST LOW 20 MIN: CPT | Mod: PBBFAC,25,PO | Performed by: NURSE PRACTITIONER

## 2025-02-07 PROCEDURE — 81000 URINALYSIS NONAUTO W/SCOPE: CPT | Mod: PO | Performed by: NURSE PRACTITIONER

## 2025-02-07 PROCEDURE — 99214 OFFICE O/P EST MOD 30 MIN: CPT | Mod: S$PBB,,, | Performed by: NURSE PRACTITIONER

## 2025-02-07 PROCEDURE — 93970 EXTREMITY STUDY: CPT | Mod: 26,,, | Performed by: RADIOLOGY

## 2025-02-07 PROCEDURE — 87086 URINE CULTURE/COLONY COUNT: CPT | Performed by: NURSE PRACTITIONER

## 2025-02-07 PROCEDURE — 93970 EXTREMITY STUDY: CPT | Mod: TC,PO

## 2025-02-07 RX ORDER — CETIRIZINE HYDROCHLORIDE 10 MG/1
10 TABLET ORAL DAILY PRN
Qty: 30 TABLET | Refills: 11 | Status: SHIPPED | OUTPATIENT
Start: 2025-02-07

## 2025-02-07 RX ORDER — AMMONIUM LACTATE 12 G/100G
LOTION TOPICAL
Qty: 225 G | Refills: 0 | Status: SHIPPED | OUTPATIENT
Start: 2025-02-07

## 2025-02-07 NOTE — PROGRESS NOTES
"Subjective     Patient ID: Sobeida Mcclain is a 89 y.o. female.    Chief Complaint: Follow-up (PT states that her left leg has been giving out on her. ) and Medication Problem (Pt stated that after taking prednisone her body was swelling that includes legs, mouth, feet. Pt daughter maddi states that she thinks pt's (mother) tongue was swelling. )      HPI      Patient is known to me. PCP is Dr. Ames.     89 y/o F with HTN, PVD, CAD, PAF on Eliquis, CKD3, Anemia presents to clinic for followup on rash and LE edema.      Review of Systems       Objective   Vitals:    02/07/25 1339   BP: (!) 150/56   Pulse: 61   SpO2: 95%   Weight: 48 kg (105 lb 13.1 oz)   Height: 5' 1" (1.549 m)      Physical Exam  Constitutional:       General: She is not in acute distress.     Appearance: Normal appearance. She is not ill-appearing, toxic-appearing or diaphoretic.   HENT:      Head: Normocephalic and atraumatic.   Cardiovascular:      Pulses:           Dorsalis pedis pulses are 2+ on the right side and 2+ on the left side.   Pulmonary:      Effort: No respiratory distress.      Breath sounds: Wheezing (scant inspiratory wheezing to bilateral lower extremities.) present.   Musculoskeletal:      Right lower leg: Edema (mild) present.      Left lower leg: Edema (mild) present.   Skin:     General: Skin is warm and dry.      Capillary Refill: Capillary refill takes less than 2 seconds.      Findings: Rash present. No erythema (bilateral lower extremities).      Comments: Skin is very dry, flaky- excoriations are resolved. Hyperpigmentation and hair loss to lower extremities with dry flaky skin consistent with stasis dermatitis.    Neurological:      General: No focal deficit present.      Mental Status: She is alert and oriented to person, place, and time.   Psychiatric:         Mood and Affect: Mood normal.         Behavior: Behavior normal.         Thought Content: Thought content normal.         Judgment: Judgment normal.         1. " Essential hypertension  - COMPREHENSIVE METABOLIC PANEL; Future  - CBC W/ AUTO DIFFERENTIAL; Future  - TSH; Future  -Suboptimal control- not consistent with meds, will check labs and consider adding a diuretic to help address blood pressure and LE edema    2. Stage 3a chronic kidney disease  -due for repeat labs.    3. Nausea  - Urinalysis  -r/o UTI, likely due to poor appetite. No current nausea.    4. Coronary artery disease, unspecified vessel or lesion type, unspecified whether angina present, unspecified whether native or transplanted heart  - Lipid Panel; Future  -Stable on Eliquis and asa.     5. Esophageal dysphagia  - Ambulatory referral/consult to Gastroenterology; Future    6. Chronic obstructive pulmonary disease, unspecified COPD type  -Noted on chest xray  -Denies SOB/CP but does endorse morning cough    7. Xerosis cutis  - ammonium lactate (LAC-HYDRIN) 12 % lotion; Apply topically as needed for Dry Skin.  Dispense: 225 g; Refill: 0    8. Allergic Rhinitis  -cetirizine 10mg po qd prn    RTC/ER precautions given. F/U as scheduled with PCP.    Counseled on regular exercise, maintenance of a healthy weight, balanced diet rich in fruits/vegetables and lean protein, and avoidance of unhealthy habits like smoking and excessive alcohol intake.    TYLOR Pemberton

## 2025-02-09 LAB
BACTERIA UR CULT: NORMAL
BACTERIA UR CULT: NORMAL

## 2025-02-13 ENCOUNTER — OFFICE VISIT (OUTPATIENT)
Dept: GASTROENTEROLOGY | Facility: CLINIC | Age: 89
End: 2025-02-13
Payer: MEDICARE

## 2025-02-13 VITALS — WEIGHT: 104.94 LBS | HEIGHT: 61 IN | BODY MASS INDEX: 19.81 KG/M2

## 2025-02-13 DIAGNOSIS — Z87.898 HISTORY OF SHORTNESS OF BREATH: ICD-10-CM

## 2025-02-13 DIAGNOSIS — R10.13 EPIGASTRIC PAIN: ICD-10-CM

## 2025-02-13 DIAGNOSIS — K22.2 ESOPHAGEAL STENOSIS: ICD-10-CM

## 2025-02-13 DIAGNOSIS — K86.9 PANCREATIC LESION: ICD-10-CM

## 2025-02-13 DIAGNOSIS — R13.19 ESOPHAGEAL DYSPHAGIA: Primary | ICD-10-CM

## 2025-02-13 DIAGNOSIS — R63.4 WEIGHT LOSS: ICD-10-CM

## 2025-02-13 DIAGNOSIS — R07.9 NONSPECIFIC CHEST PAIN: ICD-10-CM

## 2025-02-13 PROCEDURE — 99213 OFFICE O/P EST LOW 20 MIN: CPT | Mod: PBBFAC,PO | Performed by: NURSE PRACTITIONER

## 2025-02-13 NOTE — PATIENT INSTRUCTIONS
Soft Diet   About this topic   Some people have problems chewing or swallowing. This might happen after a procedure or illness. A soft diet can give you good nutrition until you can chew and swallow normally.       What will the results be?   You can still get a balanced diet with these foods. You will have less trouble chewing or swallowing.  What changes to diet are needed?   You may need to replace some harder foods with softer foods. These will be easier to chew or swallow, but will have the same nutrients.  Who should use this diet?   Your doctor might suggest a soft diet if you:  Are having radiation therapy on the head, neck, or belly  Had stomach or gut surgery  Are too sick or weak to eat a normal meal  Have poor teeth  Have swallowing problems  Have trouble chewing regular food  What foods are good to eat?   You will be eating foods that are easy to chew and swallow on this diet. These foods are naturally soft. If not, you can cook, chop, or mash them to make them soft. These include:  Cream soups  Moist, tender, well-cooked meats  Fish  Soft chicken without skin  [place]  Ground meats  Milk products  Yogurt (without nuts, seeds, or raw fruit)  Cottage cheese  Cooked or canned fruit  Soft, peeled fresh fruits without large seeds  Fruit or vegetable juice  Cooked or canned vegetables  Mashed, baked, steamed, or boiled vegetables  Cooked cereals  Rice, if tolerated  Soft breads and crackers  Soft, cooked pasta  Butter  Smooth nut and seed butters  Mayonnaise  Sour cream  Vegetable oil  Smooth ice cream  Sherbet  Custards  Puddings  Cakes  What foods should be limited or avoided?   You should stay away from foods that are hard to chew or swallow, such as:  Tough, dry meat  Yogurt with nuts or coconut  Raw fruits and vegetables  Cooked corn or peas  Dried fruits  Fruit snacks, such as fruit leather  Uncooked, stringy fruits and vegetables, like celery  Fried vegetables  Chewy, dry breads  Crispy crackers or  chips  Coarse, dry cereals  Nuts and seeds  [place] nut and seed butters  Fried foods  Sausage  [place]  Cold cuts  Strong, hard cheeses  Peanut brittle  Candy with dried fruit or chewy and sticky candy, like caramel  Will there be any other care needed?   Use a  or  to mash foods if needed.  Steam foods to keep the nutrients. If boiling potatoes, peel after cooking.  Bite-sized pieces are easier to swallow. Pieces should be less than one inch in size.  Foods should be moist. Add gravy, sauce, vegetable juice, fruit juice, broth, milk, or water to moisten foods.  Reheat foods carefully to avoid a tough crust forming on your food.  When do I need to call the doctor?   Call your doctor or dietitian to talk about a good meal plan for you or if you are having problems eating a soft diet. Talk to your doctor about what type of soft diet you need.  Last Reviewed Date   2021-06-23  Consumer Information Use and Disclaimer   This information is not specific medical advice and does not replace information you receive from your health care provider. This is only a brief summary of general information. It does NOT include all information about conditions, illnesses, injuries, tests, procedures, treatments, therapies, discharge instructions or life-style choices that may apply to you. You must talk with your health care provider for complete information about your health and treatment options. This information should not be used to decide whether or not to accept your health care providers advice, instructions or recommendations. Only your health care provider has the knowledge and training to provide advice that is right for you.  Copyright   Copyright © 2021 Up[employer], Inc. and its affiliates and/or licensors. All rights reserved.   Acid Reflux and GERD in Adults Discharge Instructions   About this topic   GERD stands for gastroesophageal reflux disease. It is sometimes called reflux or acid reflux. Acid  reflux happens when your stomach acid backs up into your esophagus, the tube that carries your food from your mouth to your stomach. This can be uncomfortable. You may have stomach or chest pain (heartburn), trouble swallowing, or an upset stomach. Some people have a cough or sore throat.  Most of the time, you can use over-the-counter medicines to help with this problem.       What care is needed at home?   Ask your doctor what you need to do when you go home. Make sure you ask questions if you do not understand what the doctor says.  Raise the head of your bed by 6 to 8 inches (15 to 20 cm). Use wood or rubber blocks under 2 legs or try a foam wedge under your mattress. Just sleeping with your head raised on pillows is not enough.  Avoid beer, wine, and mixed drinks and avoid caffeine.  Keep a healthy weight. If you are too heavy, lose weight.  If you smoke, try to quit. Your doctor or nurse can help.  Keep a diary of your signs. Write down what you had to eat before you had reflux. This will help you learn which foods cause you problems. For some people, they need to avoid coffee, chocolate, alcohol, spicy or fatty foods, or peppermint.  Avoid eating for 2 to 3 hours before bedtime. Lying down after you eat can make reflux worse.  Avoid belts and clothes that are too tight.  What follow-up care is needed?   Your doctor may ask you to make visits to the office to check on your progress. Be sure to keep these visits.  What drugs may be needed?   The doctor may order drugs to:  Relieve heartburn  Prevent reflux  Lessen acid production  Heal the esophageal lining  Will physical activity be limited?   Your physical activities will not be limited.  What problems could happen?   Asthma  Precancerous changes in the food pipe  Long-term cough  Dental problems  Higher risk of cancer of the food pipe. This is esophageal cancer.  Narrowing of the food pipe. This is a stricture.  Open sore in the food pipe. This is an  ulcer.  When do I need to call the doctor?   You have signs of a heart attack, which may include:  Severe chest pain, pressure, or discomfort with:  Breathing trouble, sweating, upset stomach, or cold, clammy skin.  Pain in your arms, back, or jaw.  Worse pain with activity like walking up stairs.  Fast or irregular heartbeat.  Feeling dizzy, faint, or weak.  You have sudden, severe belly pain or the belly pain is constant.  You have blood in the undigested food and acid that comes up, or stool that looks red, black, or like tar.  You feel like your food gets stuck or you have pain when you swallow.  You lose weight when you are not trying to.  You choke when you are eating.  Your reflux is very bad, very frequent, or not helped by over-the-counter medicines.  You keep throwing up.  Teach Back: Helping You Understand   The Teach Back Method helps you understand the information we are giving you. After you talk with the staff, tell them in your own words what you learned. This helps to make sure the staff has described each thing clearly. It also helps to explain things that may have been confusing. Before going home, make sure you can do these:  I can tell you about my condition.  I can tell you what changes I need to make with my eating habits to ease the reflux.  I can tell you what I will do if I am throwing up fluid that looks like blood or coffee grounds.  Where can I learn more?   American Academy of Family Physicians  https://[email or web address]/refluxacid-reflux/   NHS Choices  https://www.nhs.uk/conditions/heartburn-and-acid-reflux/   Last Reviewed Date   2021-06-09  Consumer Information Use and Disclaimer   This information is not specific medical advice and does not replace information you receive from your health care provider. This is only a brief summary of general information. It does NOT include all information about conditions, illnesses, injuries, tests, procedures, treatments, therapies,  "discharge instructions or life-style choices that may apply to you. You must talk with your health care provider for complete information about your health and treatment options. This information should not be used to decide whether or not to accept your health care providers advice, instructions or recommendations. Only your health care provider has the knowledge and training to provide advice that is right for you.  Copyright   Copyright © 2021 UpToDate, Inc. and its affiliates and/or licensors. All rights reserved.   Stomach Ache and Stomach Upset   The Basics   Written by the doctors and editors at [facility]   What happens when you have a stomach ache? -- When you have a stomach ache, you have pain or discomfort in your belly. Sometimes that's the only symptom you have. Other times, you can have other symptoms such as:  Burning in your chest known as heartburn  Burping  Bloating (feeling as though your belly is filled with air)  Feeling full too quickly when you start eating  Should I see a doctor or nurse about my stomach ache? -- Most people do not need to see a doctor or nurse for a stomach ache. But you should see your doctor or nurse if:  You have bloody bowel movements, diarrhea, or vomiting  Your pain is severe and lasts more than an hour or comes and goes for more than 24 hours  You cannot eat or drink for hours  You have a fever higher than 102°F (39°C)  You lose a lot of weight without trying to, or lose interest in food  What causes stomach aches? -- In some cases, stomach aches are caused by a specific problem, such as a stomach ulcer (a sore on the inside of the stomach) or a condition called "diverticulitis," in which small pouches in your large intestine get infected. But in some cases, doctors and nurses do not know what causes stomach aches or the other symptoms that happen with them. Even so, doctors and nurses can usually treat the symptoms of stomach ache.  What treatments help with stomach " "symptoms? -- If your symptoms are caused by a specific problem, such as an ulcer, treatments for that problem will likely relieve your symptoms. But if your doctor or nurse does not know what is causing your pain, he or she might recommend medicines that reduce the amount of acid in your stomach. These medicines often relieve stomach ache and the symptoms that come with it. Some of these medicines are available without a prescription.  Can I do anything on my own to prevent stomach ache? -- Yes, the foods you eat and the way you eat them can have a big effect on whether or not you feel pain.  To lower your chances of getting a stomach ache:  Avoid fatty foods, such as red meat, butter, fried foods, and cheese  Eat a bunch of small meals each day, rather than two or three big meals  Stay away from foods that seem to make your symptoms worse  Avoid taking over-the-counter medicines that seem to make your symptoms worse - Examples include aspirin or ibuprofen (sample brand names: Advil, Motrin).  Some people - especially kids - sometimes get a stomach ache after drinking milk or eating cheese, ice cream, or other foods that have milk in them. They have a problem called "lactose intolerance," which means that they cannot fully break down foods that have milk in them.  People with lactose intolerance can avoid problems caused by milk if they take a medicine called lactase. Lactase (sample brand name: Lactaid), helps your body break down milk. Some foods come with it already added.  If your stomach ache seems to be related to constipation, meaning that you do not have enough bowel movements, you might need more fiber or a medicine called a laxative. (Laxatives are medicines that increase the number of bowel movements you have.)  Taking in a lot of fiber helps to increase the number of bowel movements you have. You can get more fiber by:  Eating plenty of fruits, vegetables, and whole grains  Taking fiber pills, powders, " or wafers  Is a stomach ache the same for children as it is for adults? -- In general, yes. Children get stomach aches for the same reasons that adults do. As with adults, doctors and nurses often do not know what causes stomach pain in children. But in children, stomach pain is often triggered by stress or anxiety. For them, it's especially important to pay attention to psychological or emotional problems that might be making pain worse.  All topics are updated as new evidence becomes available and our peer review process is complete.  This topic retrieved from [employer] on: Sep 21, 2021.  Topic 80933 Version 7.0  Release: 29.4.2 - C29.263  © 2021 UpToDate, Inc. and/or its affiliates. All rights reserved.  Consumer Information Use and Disclaimer   This information is not specific medical advice and does not replace information you receive from your health care provider. This is only a brief summary of general information. It does NOT include all information about conditions, illnesses, injuries, tests, procedures, treatments, therapies, discharge instructions or life-style choices that may apply to you. You must talk with your health care provider for complete information about your health and treatment options. This information should not be used to decide whether or not to accept your health care provider's advice, instructions or recommendations. Only your health care provider has the knowledge and training to provide advice that is right for you. The use of this information is governed by the [employer] End User License Agreement, available at https://www.[employer]/en/solutions/[employer]Peer ([place])/about/venkatesh.The use of [employer] content is governed by the [employer] Terms of Use. ©2021 UpToDate, Inc. All rights reserved.  Copyright   © 2021 UpToDate, Inc. and/or its affiliates. All rights reserved.   Chest Pain   About this topic   Chest pain is felt in the upper part of your body from your neck to your belly. You may  feel pain, pressure, or tightness. Many things can cause chest pain. Some are serious things like heart disease or lung disease. Less serious things like stomach problems can also cause chest pain.  The doctors may not be able to find all serious causes of chest pain the first time they see you. It is important that you follow up with your doctor. Treatment will depend on what is causing your chest pain.  What are the causes?   Some of the problems related to your heart include:  Heart attack. This is a blockage of blood supply to part of the heart.  Angina. This is similar to a heart attack, but without long-lasting heart damage.       Arrhythmia. This is abnormal heartbeats.  Pericarditis. This is irritation of the sac around the heart.  Some of the problems that may not be related to your heart include:  Digestive problems like ulcers, indigestion, gastric reflux, gallstones  Lung problems like collapsed lung, blood clots, asthma, pneumonia  Rib injuries or muscle pain  Panic attack  Pinched nerve  Shingles  What are the main signs?   Chest pain of any type should be checked by a doctor.  Signs that may show a more serious cause of chest pain are:  Squeezing or tightness in the chest which may spread to the back, neck, jaw, shoulders, or arms  Shortness of breath  Sweating  Dizziness  Upset stomach, nausea, or throwing up  Weakness  Feeling faint  If you have chest pain with any of these signs, call for emergency help.  How does the doctor diagnose this health problem?   Your doctor will do an exam. The doctor may ask you questions about your pain. Your doctor may order:  Lab tests  Chest x-ray  Electrocardiogram (ECG)     Echocardiogram  Stress test  Nuclear heart scanning  Computed tomography (CT)  Heart cath or catheterization  How does the doctor treat this health problem?   Your treatment will depend on what is causing the pain. Many times, drugs may be given to treat the problem. Any chest pain could be  serious. More serious problems can be treated by opening the vessel in your heart with a balloon or a metal straw called a stent. You may need surgery to replace the vessels in your heart. Always talk to your doctor about chest pain.  What lifestyle changes are needed?   Once your doctor finds the cause of your chest pain, you may be asked to make changes to your diet, activity, weight, and drugs you take. These changes will depend on the cause of your pain.  What drugs may be needed?   If chest pain is caused by a heart-related problem, the doctor may order drugs to:  Thin the blood  Dissolve a blood clot  Lower cholesterol  Lessen the work of your heart  Correct or prevent an abnormal heartbeat  Lower your blood pressure  Increase blood flow to the heart muscle  Relax the heart and help avoid spasms in the arteries  Check with your doctor before you take drugs like ibuprofen, naproxen, vitamins, or hormone replacement therapy.  If chest pain is caused by a something other than your heart, the doctor may order drugs to:  Help with pain  Treat stomach problems  Help with breathing  Help you relax  Control coughing  What problems could happen?   If the pain is due to a serious problem with the heart or lungs, the following things could happen:  Heart attack with long-lasting damage to the heart  Abnormal heartbeat that is too fast, too slow, or irregular  The heart stops beating. This is cardiac arrest. If not promptly treated, it can result in death.  What can be done to prevent this health problem?   If you smoke, stop.  Keep a healthy weight. If you are too heavy, lose weight.  Keep blood pressure, cholesterol, and high blood sugar (diabetes) under control.  Be active. Walk, garden, or do something active for 30 minutes or more on most days of the week.  Eat lots of fiber, fruits, starches, and vegetables and stay away from foods that are high in fats.  When do I need to call the doctor?   Activate the emergency  medical system right away if you have signs of a heart attack. Call 911 in the [place] or [place]. The sooner treatment begins, the better your chances for recovery. Call for emergency help right away if you have:  Signs of heart attack:  Chest pain  Trouble breathing  Fast heartbeat  Feeling dizzy  Not had chest pain before and it does not go away with rest after 5 minutes. Do not drive yourself to the hospital or have someone drive you. The emergency rescue people can begin to treat you the minute they arrive.       If you are to take nitroglycerin pills or spray with chest pain:  Rest. Sit or lie down.  Spray or place one pill under your tongue and let it melt.  If the pain is not better or is getting worse after 5 minutes, call for emergency help. Then take one more spray or pill under your tongue.  If the pain does not get better after another 3 to 5 minutes, take a third spray or pill under your tongue.  Drink a sip of water to wet your mouth if it is too dry to let the pills break down.  Where can I learn more?   American Heart Association  http://www.heart.org/HEARTORG/Conditions/HeartAttack/AboutHeartAttacks/About-Heart-Attacks_UCM_002038_Article.jsp   American Heart Association  http://www.heart.org/HEARTORG/Conditions/HeartAttack/SymptomsDiagnosisofHeartAttack/Angina-[email or web address]_UCM_450308_Article.jsp   FamilyDoctor.org  http://familydoctor.org/familydoctor/en/diseases-conditions/angina.printerview.all.html   NHS Choices  https://www.nhs.uk/conditions/chest-pain/   Last Reviewed Date   2021-06-21  Consumer Information Use and Disclaimer   This information is not specific medical advice and does not replace information you receive from your health care provider. This is only a brief summary of general information. It does NOT include all information about conditions, illnesses, injuries, tests, procedures, treatments, therapies, discharge instructions or life-style choices that may apply to you. You  must talk with your health care provider for complete information about your health and treatment options. This information should not be used to decide whether or not to accept your health care providers advice, instructions or recommendations. Only your health care provider has the knowledge and training to provide advice that is right for you.  Copyright   Copyright © 2021 [employer] and its affiliates and/or licensors. All rights reserved.

## 2025-02-13 NOTE — PROGRESS NOTES
"Subjective:       Patient ID: Sobeida Mcclain is a 89 y.o. female Body mass index is 19.83 kg/m².    Chief Complaint: Dysphagia    This patient is established with Dr. Naik and myself.    Patient is here with daughter, whom assisted with history. Reports a few weeks ago she was given prednisone course and she had reaction to it and had swelling of mouth, tongue, face, and throat from it. She stopped it and it has gradually resolved. They report they think this allergic reaction triggered/worsened her dysphagia. Patient and daughter both report that the patient does not want to do another EGD at this time.    GI Problem  The primary symptoms include weight loss (daughter reports patient does not eat much) and abdominal pain (occasional mild pain to epigastric area; patient relates to "my nerves are bad", denies currently). Primary symptoms do not include fever, fatigue, nausea, vomiting, diarrhea, melena, hematemesis, jaundice, hematochezia or dysuria.   The illness is also significant for dysphagia (CHIEF COMPLAINT: problems with food and pills- feels like they get stuck in esophagus; worse with meat; no problems with liquids; improved after egd with dilation for ~2 weeks but recurred thereafter). The illness does not include chills, anorexia, odynophagia, constipation or back pain. Associated medical issues do not include GERD (denies heartburn; PAST: prilosec, dexilant).     Review of Systems   Constitutional:  Positive for appetite change (decreased) and weight loss (daughter reports patient does not eat much). Negative for chills, fatigue and fever.   HENT:  Positive for trouble swallowing. Negative for mouth sores and sore throat.    Respiratory:  Positive for shortness of breath (occasional; denies currently). Negative for cough, choking and chest tightness.    Cardiovascular:  Positive for chest pain (chronic intermittent; denies currently; seeing Dr. Fairbanks). Negative for palpitations.   Gastrointestinal:  " "Positive for abdominal pain (occasional mild pain to epigastric area; patient relates to "my nerves are bad", denies currently) and dysphagia (CHIEF COMPLAINT: problems with food and pills- feels like they get stuck in esophagus; worse with meat; no problems with liquids; improved after egd with dilation for ~2 weeks but recurred thereafter). Negative for anal bleeding, anorexia, blood in stool, constipation, diarrhea, hematemesis, hematochezia, jaundice, melena, nausea, rectal pain and vomiting.   Genitourinary:  Negative for difficulty urinating and dysuria.   Musculoskeletal:  Negative for back pain.   Neurological:  Negative for weakness.       Past Medical History:   Diagnosis Date    Anticoagulant long-term use     Carotid artery disease     Coronary artery disease     chest discomfort, does not take NTG    Hypertension      Past Surgical History:   Procedure Laterality Date    CARDIAC SURGERY      stent insertion    CAROTID ENDARTERECTOMY      6/15/15     SECTION, CLASSIC      COLONOSCOPY  ~    CORONARY STENT PLACEMENT      x 3. 3/2015    DILATION AND CURETTAGE OF UTERUS      3/28/2016    ENDOMETRIAL BIOPSY      ESOPHAGOGASTRODUODENOSCOPY N/A 2018    Procedure: EGD (ESOPHAGOGASTRODUODENOSCOPY);  Surgeon: Adonis Naik MD;  Location: [facility];  Service: Endoscopy;  Laterality: N/A;    tonsillectomy      TONSILLECTOMY      UPPER GASTROINTESTINAL ENDOSCOPY  2016    Dr. Naik     Family History   Problem Relation Name Age of Onset    Breast cancer Neg Hx      Ovarian cancer Neg Hx      Colon cancer Neg Hx      Colon polyps Neg Hx      Stomach cancer Neg Hx      Esophageal cancer Neg Hx      Diabetes Neg Hx      Heart disease Neg Hx       Social History     Tobacco Use    Smoking status: Never    Smokeless tobacco: Never   Substance Use Topics    Alcohol use: No    Drug use: No     Wt Readings from Last 10 Encounters:   25 47.6 kg (104 lb 15 oz)   25 48 kg (105 lb 13.1 " oz)   01/14/25 50.5 kg (111 lb 5.3 oz)   09/03/24 49.1 kg (108 lb 3.9 oz)   03/21/24 51.7 kg (113 lb 15.7 oz)   03/21/24 49.1 kg (108 lb 3.9 oz)   03/01/24 49.1 kg (108 lb 3.9 oz)   01/11/24 46.4 kg (102 lb 4.7 oz)   08/30/23 44.8 kg (98 lb 12.3 oz)   05/31/23 45.3 kg (99 lb 13.9 oz)     Lab Results   Component Value Date    WBC 5.81 01/11/2024    HGB 10.8 (L) 01/11/2024    HCT 33.0 (L) 01/11/2024    MCV 95 01/11/2024     01/11/2024     CMP  Sodium   Date Value Ref Range Status   09/28/2024 137 136 - 145 mmol/L Final   01/11/2024 138 136 - 145 mmol/L Final     Potassium   Date Value Ref Range Status   09/28/2024 3.8 3.5 - 5.1 mmol/L Final   01/11/2024 4.1 3.5 - 5.1 mmol/L Final     Chloride   Date Value Ref Range Status   01/11/2024 105 95 - 110 mmol/L Final     CO2   Date Value Ref Range Status   01/11/2024 25 23 - 29 mmol/L Final     Carbon Dioxide   Date Value Ref Range Status   09/28/2024 26 21 - 32 mmol/L Final     Glucose   Date Value Ref Range Status   01/11/2024 88 70 - 110 mg/dL Final     BUN   Date Value Ref Range Status   09/28/2024 15.0 7.0 - 18.0 mg/dL Final   01/11/2024 19 8 - 23 mg/dL Final     Creatinine   Date Value Ref Range Status   09/28/2024 1.18 (H) 0.51 - 0.95 mg/dL Final   01/11/2024 1.2 0.5 - 1.4 mg/dL Final     Calcium   Date Value Ref Range Status   09/28/2024 8.8 8.5 - 10.1 mg/dL Final   01/11/2024 9.6 8.7 - 10.5 mg/dL Final     Total Protein   Date Value Ref Range Status   05/31/2023 7.4 6.0 - 8.4 g/dL Final     Albumin   Date Value Ref Range Status   09/28/2024 3.1 (L) 3.4 - 5.0 g/dL Final   01/11/2024 3.6 3.5 - 5.2 g/dL Final     Total Bilirubin   Date Value Ref Range Status   09/28/2024 0.5 0.2 - 1.3 mg/dL Final   05/31/2023 0.4 0.1 - 1.0 mg/dL Final     Comment:     For infants and newborns, interpretation of results should be based  on gestational age, weight and in agreement with clinical  observations.    Premature Infant recommended reference ranges:  Up to 24  hours.............<8.0 mg/dL  Up to 48 hours............<12.0 mg/dL  3-5 days..................<15.0 mg/dL  6-29 days.................<15.0 mg/dL       Alkaline Phosphatase   Date Value Ref Range Status   05/31/2023 66 55 - 135 U/L Final     AST   Date Value Ref Range Status   09/28/2024 19 15 - 37 U/L Final   05/31/2023 14 10 - 40 U/L Final     ALT   Date Value Ref Range Status   09/28/2024 8 (L) 13 - 61 U/L Final   05/31/2023 7 (L) 10 - 44 U/L Final     Anion Gap   Date Value Ref Range Status   01/11/2024 8 8 - 16 mmol/L Final     eGFR if    Date Value Ref Range Status   05/25/2022 53.3 (A) >60 mL/min/1.73 m^2 Final     eGFR if non    Date Value Ref Range Status   05/25/2022 46.2 (A) >60 mL/min/1.73 m^2 Final     Comment:     Calculation used to obtain the estimated glomerular filtration  rate (eGFR) is the CKD-EPI equation.        Lab Results   Component Value Date    WELWNMKO24 651 05/25/2022 9/28/20204 MAGNESIUM WNL (in care everywhere)    Reviewed prior medical records including radiology report of 10/21/2022 CT abdomen pelvis with contrast; & 2/25/16 pelvic ultrasound; & endoscopy history (see surgical history).    Objective:      Physical Exam  Vitals and nursing note reviewed.   Constitutional:       General: She is not in acute distress.     Appearance: Normal appearance. She is well-developed. She is not diaphoretic.   HENT:      Mouth/Throat:      Mouth: No oral lesions.      Pharynx: No oropharyngeal exudate.   Eyes:      General: No scleral icterus.     Conjunctiva/sclera: Conjunctivae normal.   Pulmonary:      Effort: Pulmonary effort is normal. No respiratory distress.      Breath sounds: Normal breath sounds. No wheezing.   Abdominal:      General: Bowel sounds are normal. There is no distension or abdominal bruit.      Palpations: Abdomen is soft. Abdomen is not rigid. There is no mass.      Tenderness: There is no abdominal tenderness. There is no guarding or  rebound. Negative signs include Riley's sign and McBurney's sign.      Hernia: No hernia is present.   Skin:     General: Skin is warm and dry.      Coloration: Skin is not jaundiced or pale.      Findings: No erythema or rash.   Neurological:      Mental Status: She is alert and oriented to person, place, and time.   Psychiatric:         Behavior: Behavior normal.         Assessment:       1. Esophageal dysphagia    2. Esophageal stenosis    3. Epigastric pain    4. Pancreatic lesion    5. Weight loss    6. History of shortness of breath    7. Nonspecific chest pain          Plan:       Esophageal dysphagia & Esophageal stenosis  - discussed about evaluation and management options, including but not limited to EGD, UGI, esophageal manometry; patient and her daughter both verbalized understanding and patient wants to proceed with UGI radiology study at this time  - recommend to eat smaller more frequent meals and to eat slowly and advised to eat a soft diet. Take medications one at a time with a full glass of water.  -     FL Esophagram With Barium Tablet; Future; Expected date: 02/13/2025  - possible EGD/esophageal manometry if symptoms persist    Epigastric pain  -     FL Esophagram With Barium Tablet; Future; Expected date: 02/13/2025  - avoid/minimize use of NSAIDs- since they can cause GI upset, bleeding and/or ulcers. If NSAID must be taken, recommend take with food.  - Possible additional radiology studies pending results of testing and if symptoms persist    Pancreatic lesion  - patient declined further testing (including CT scan) of finding at this time  - the radiologist reported unchanged finding on last CT scan from 10/21/2022    Weight loss  - encouraged PO intake and daily calorie counts to ensure adequate nutrition is taken in, recommend at least 2,000 calories a day  - recommend nutritional drinks, such as Boost, Ensure or Glucerna, to supplement nutrition needs  - patient declined endoscopy, CT  scan, or further testing of this symptom at this time    History of shortness of breath  - follow-up with PCP/pulmonology for continued evaluation and management ASAP  - if experiencing symptoms of headache, chest pain, severe/persistent shortness of breath, dizziness, and/or blurred vision, recommend going to ER for further evaluation and management    Nonspecific chest pain  - follow-up with PCP &/or cardiologist for continued evaluation and management ASAP  - if experiencing symptoms of headache, chest pain, shortness of breath, and/or blurred vision, recommend going to ER for further evaluation and management    Follow up in about 1 month (around 3/13/2025), or if symptoms worsen or fail to improve.    If no improvement in symptoms or symptoms worsen, call/follow-up at clinic or go to ER.      34 minutes of total time spent on the encounter, which includes face to face time and non-face to face time preparing to see the patient (e.g., review of tests), Obtaining and/or reviewing separately obtained history, Documenting clinical information in the electronic or other health record, Independently interpreting results (not separately reported) and communicating results to the patient/family/caregiver, or Care coordination (not separately reported).

## 2025-03-07 ENCOUNTER — OFFICE VISIT (OUTPATIENT)
Dept: FAMILY MEDICINE | Facility: CLINIC | Age: 89
End: 2025-03-07
Payer: MEDICARE

## 2025-03-07 ENCOUNTER — LAB VISIT (OUTPATIENT)
Dept: LAB | Facility: HOSPITAL | Age: 89
End: 2025-03-07
Attending: NURSE PRACTITIONER
Payer: MEDICARE

## 2025-03-07 VITALS
OXYGEN SATURATION: 99 % | WEIGHT: 105.63 LBS | HEIGHT: 61 IN | HEART RATE: 57 BPM | SYSTOLIC BLOOD PRESSURE: 126 MMHG | DIASTOLIC BLOOD PRESSURE: 62 MMHG | BODY MASS INDEX: 19.94 KG/M2

## 2025-03-07 DIAGNOSIS — Z00.00 ENCOUNTER FOR PREVENTIVE HEALTH EXAMINATION: Primary | ICD-10-CM

## 2025-03-07 DIAGNOSIS — N18.31 STAGE 3A CHRONIC KIDNEY DISEASE: ICD-10-CM

## 2025-03-07 DIAGNOSIS — I77.9 CAROTID ARTERY DISEASE, UNSPECIFIED LATERALITY, UNSPECIFIED TYPE: ICD-10-CM

## 2025-03-07 DIAGNOSIS — Z00.00 ENCOUNTER FOR MEDICARE ANNUAL WELLNESS EXAM: ICD-10-CM

## 2025-03-07 DIAGNOSIS — I70.223 ATHEROSCLEROSIS OF NATIVE ARTERIES OF EXTREMITIES WITH REST PAIN, BILATERAL LEGS: ICD-10-CM

## 2025-03-07 DIAGNOSIS — I25.10 CORONARY ARTERY DISEASE, UNSPECIFIED VESSEL OR LESION TYPE, UNSPECIFIED WHETHER ANGINA PRESENT, UNSPECIFIED WHETHER NATIVE OR TRANSPLANTED HEART: ICD-10-CM

## 2025-03-07 DIAGNOSIS — I10 ESSENTIAL HYPERTENSION: ICD-10-CM

## 2025-03-07 DIAGNOSIS — I73.9 PERIPHERAL VASCULAR DISEASE: ICD-10-CM

## 2025-03-07 DIAGNOSIS — J44.9 CHRONIC OBSTRUCTIVE PULMONARY DISEASE, UNSPECIFIED COPD TYPE: ICD-10-CM

## 2025-03-07 DIAGNOSIS — I48.0 PAROXYSMAL ATRIAL FIBRILLATION: ICD-10-CM

## 2025-03-07 LAB
ALBUMIN SERPL BCP-MCNC: 3.2 G/DL (ref 3.5–5.2)
ALP SERPL-CCNC: 75 U/L (ref 40–150)
ALT SERPL W/O P-5'-P-CCNC: <5 U/L (ref 10–44)
ANION GAP SERPL CALC-SCNC: 8 MMOL/L (ref 8–16)
AST SERPL-CCNC: 16 U/L (ref 10–40)
BASOPHILS # BLD AUTO: 0.04 K/UL (ref 0–0.2)
BASOPHILS NFR BLD: 0.6 % (ref 0–1.9)
BILIRUB SERPL-MCNC: 0.3 MG/DL (ref 0.1–1)
BUN SERPL-MCNC: 20 MG/DL (ref 8–23)
CALCIUM SERPL-MCNC: 9 MG/DL (ref 8.7–10.5)
CHLORIDE SERPL-SCNC: 109 MMOL/L (ref 95–110)
CHOLEST SERPL-MCNC: 194 MG/DL (ref 120–199)
CHOLEST/HDLC SERPL: 4.4 {RATIO} (ref 2–5)
CO2 SERPL-SCNC: 24 MMOL/L (ref 23–29)
CREAT SERPL-MCNC: 1.5 MG/DL (ref 0.5–1.4)
DIFFERENTIAL METHOD BLD: ABNORMAL
EOSINOPHIL # BLD AUTO: 0.7 K/UL (ref 0–0.5)
EOSINOPHIL NFR BLD: 10.1 % (ref 0–8)
ERYTHROCYTE [DISTWIDTH] IN BLOOD BY AUTOMATED COUNT: 13.1 % (ref 11.5–14.5)
EST. GFR  (NO RACE VARIABLE): 33.5 ML/MIN/1.73 M^2
GLUCOSE SERPL-MCNC: 88 MG/DL (ref 70–110)
HCT VFR BLD AUTO: 32.6 % (ref 37–48.5)
HDLC SERPL-MCNC: 44 MG/DL (ref 40–75)
HDLC SERPL: 22.7 % (ref 20–50)
HGB BLD-MCNC: 10.4 G/DL (ref 12–16)
IMM GRANULOCYTES # BLD AUTO: 0.02 K/UL (ref 0–0.04)
IMM GRANULOCYTES NFR BLD AUTO: 0.3 % (ref 0–0.5)
LDLC SERPL CALC-MCNC: 128.2 MG/DL (ref 63–159)
LYMPHOCYTES # BLD AUTO: 1.7 K/UL (ref 1–4.8)
LYMPHOCYTES NFR BLD: 26.6 % (ref 18–48)
MCH RBC QN AUTO: 30.2 PG (ref 27–31)
MCHC RBC AUTO-ENTMCNC: 31.9 G/DL (ref 32–36)
MCV RBC AUTO: 95 FL (ref 82–98)
MONOCYTES # BLD AUTO: 0.5 K/UL (ref 0.3–1)
MONOCYTES NFR BLD: 7.7 % (ref 4–15)
NEUTROPHILS # BLD AUTO: 3.6 K/UL (ref 1.8–7.7)
NEUTROPHILS NFR BLD: 54.7 % (ref 38–73)
NONHDLC SERPL-MCNC: 150 MG/DL
NRBC BLD-RTO: 0 /100 WBC
PLATELET # BLD AUTO: 312 K/UL (ref 150–450)
PMV BLD AUTO: 10.2 FL (ref 9.2–12.9)
POTASSIUM SERPL-SCNC: 4.5 MMOL/L (ref 3.5–5.1)
PROT SERPL-MCNC: 6.8 G/DL (ref 6–8.4)
RBC # BLD AUTO: 3.44 M/UL (ref 4–5.4)
SODIUM SERPL-SCNC: 141 MMOL/L (ref 136–145)
T4 FREE SERPL-MCNC: 1.01 NG/DL (ref 0.71–1.51)
TRIGL SERPL-MCNC: 109 MG/DL (ref 30–150)
TSH SERPL DL<=0.005 MIU/L-ACNC: 5.61 UIU/ML (ref 0.4–4)
WBC # BLD AUTO: 6.53 K/UL (ref 3.9–12.7)

## 2025-03-07 PROCEDURE — 99999 PR PBB SHADOW E&M-EST. PATIENT-LVL IV: CPT | Mod: PBBFAC,,, | Performed by: NURSE PRACTITIONER

## 2025-03-07 PROCEDURE — 85025 COMPLETE CBC W/AUTO DIFF WBC: CPT | Performed by: NURSE PRACTITIONER

## 2025-03-07 PROCEDURE — 84439 ASSAY OF FREE THYROXINE: CPT | Performed by: NURSE PRACTITIONER

## 2025-03-07 PROCEDURE — 80053 COMPREHEN METABOLIC PANEL: CPT | Performed by: NURSE PRACTITIONER

## 2025-03-07 PROCEDURE — 84443 ASSAY THYROID STIM HORMONE: CPT | Performed by: NURSE PRACTITIONER

## 2025-03-07 PROCEDURE — 80061 LIPID PANEL: CPT | Performed by: NURSE PRACTITIONER

## 2025-03-07 PROCEDURE — 99214 OFFICE O/P EST MOD 30 MIN: CPT | Mod: PBBFAC,PO | Performed by: NURSE PRACTITIONER

## 2025-03-07 PROCEDURE — 36415 COLL VENOUS BLD VENIPUNCTURE: CPT | Mod: PO | Performed by: NURSE PRACTITIONER

## 2025-03-07 NOTE — PROGRESS NOTES
"  Sobeida Mcclain presented for an initial Medicare AWV today. The following components were reviewed and updated:    Medical history  Family History  Social history  Allergies and Current Medications  Health Risk Assessment  Health Maintenance  Care Team    **See Completed Assessments for Annual Wellness visit with in the encounter summary    The following assessments were completed:  Depression Screening  Cognitive function Screening    Timed Get Up Test  Whisper Test    Opioid documentation:  Patient does not have a current opioid prescription.        Vitals:    03/07/25 1031   BP: 126/62   Pulse: (!) 57   SpO2: 99%   Weight: 47.9 kg (105 lb 9.6 oz)   Height: 5' 1" (1.549 m)     Body mass index is 19.95 kg/m².     Physical Exam  Vitals reviewed.   Pulmonary:      Effort: Pulmonary effort is normal. No respiratory distress.   Neurological:      Mental Status: She is alert and oriented to person, place, and time.   Psychiatric:         Mood and Affect: Mood normal.         Behavior: Behavior normal.         Thought Content: Thought content normal.         Judgment: Judgment normal.     Diagnoses and health risks identified today and associated recommendations/orders:  1. Encounter for preventive health examination  Reviewed and discussed health maintenance.    Declined update on vaccines    2. Encounter for Medicare annual wellness exam  - Ambulatory Referral/Consult to Enhanced Annual Wellness Visit (eAWV)    3. Atherosclerosis of native arteries of extremities with rest pain, bilateral legs  Stable- continue current treatment and follow up routinely with PCP and cardiology ()  Pacerone 200mg daily, norvasc 5mg daily, lopressor 25mg daiy, eliquis 2.5mg daily and asa daily    4. Carotid artery disease, unspecified laterality, unspecified type  Stable- continue current treatment and follow up routinely with PCP and cardiology ()  eliquis 2.5mg daily and asa daily    5. Paroxysmal atrial " fibrillation  Stable- continue current treatment and follow up routinely with PCP and cardiology ()  Pacerone 200mg daily, norvasc 5mg daily, lopressor 25mg daiy, eliquis 2.5mg daily and asa daily    6. Peripheral vascular disease  Stable- continue current treatment and follow up routinely with PCP and cardiology ()  Pacerone 200mg daily, norvasc 5mg daily, lopressor 25mg daiy, eliquis 2.5mg daily and asa daily  Compression stockings and leg elevation     7. Stage 3a chronic kidney disease  Stable- continue current treatment and follow up routinely with PCP   Avoid NSAIDs and increase fluids  BMP  Lab Results   Component Value Date     09/28/2024    K 3.8 09/28/2024     01/11/2024    CO2 26 09/28/2024    BUN 15.0 09/28/2024    CREATININE 1.18 (H) 09/28/2024    CALCIUM 8.8 09/28/2024    ANIONGAP 8 01/11/2024    EGFRNORACEVR 45 (L) 09/28/2024      8. Chronic obstructive pulmonary disease, unspecified COPD type  Stable- continue current treatment and follow up routinely with PCP   Asymptomatic. No tx at this time - Cxr 3/2024    Provided Sobeida with a 5-10 year written screening schedule and personal prevention plan. Recommendations were developed using the USPSTF age appropriate recommendations. Education, counseling, and referrals were provided as needed.  After Visit Summary printed and given to patient which includes a list of additional screenings\tests needed.    I offered to discuss advanced care planning, including how to pick a person who would make decisions for you if you were unable to make them for yourself, called a health care power of , and what kind of decisions you might make such as use of life sustaining treatments such as ventilators and tube feeding when faced with a life limiting illness recorded on a living will that they will need to know. (How you want to be cared for as you near the end of your natural life)  Patient declined a discussion regarding advance  directives at this time. He will complete independently with his family   Alexia Marie NP

## 2025-03-07 NOTE — PATIENT INSTRUCTIONS
Counseling and Referral of Other Preventative  (Italic type indicates deductible and co-insurance are waived)    Patient Name: Sobeida Mcclain  Today's Date: 3/7/2025    Health Maintenance       Date Due Completion Date    Lipid Panel 05/31/2024 5/31/2023    Influenza Vaccine (1) 06/30/2025 (Originally 9/1/2024) 1/11/2024 (Declined)    Override on 1/11/2024: Declined    TETANUS VACCINE 09/03/2025 (Originally 7/21/1953) ---    Shingles Vaccine (1 of 2) 09/03/2025 (Originally 7/[birth year]/1987) ---    COVID-19 Vaccine (1 - 2024-25 season) 09/03/2025 (Originally 9/1/2024) ---    Pneumococcal Vaccines (Age 50+) (1 of 1 - PCV) 09/03/2025 (Originally 7/21/1985) ---        No orders of the defined types were placed in this encounter.    The following information is provided to all patients.  This information is to help you find resources for any of the problems found today that may be affecting your health:                  Living healthy guide: www.[facility].[place].gov      Understanding Diabetes: www.diabetes.org      Eating healthy: www.cdc.gov/healthyweight      CDC home safety checklist: www.cdc.gov/steadi/patient.html      Agency on Aging: www.goea.[place].[facility]      Alcoholics anonymous (AA): www.aa.org      Physical Activity: www.quang.nih.gov/fc2jywm      Tobacco use: www.quitwithusla.org

## 2025-03-12 ENCOUNTER — PATIENT MESSAGE (OUTPATIENT)
Dept: FAMILY MEDICINE | Facility: CLINIC | Age: 89
End: 2025-03-12
Payer: MEDICARE

## 2025-03-12 ENCOUNTER — RESULTS FOLLOW-UP (OUTPATIENT)
Dept: FAMILY MEDICINE | Facility: CLINIC | Age: 89
End: 2025-03-12

## 2025-03-12 DIAGNOSIS — E03.8 SUBCLINICAL HYPOTHYROIDISM: Primary | ICD-10-CM

## 2025-03-14 ENCOUNTER — OFFICE VISIT (OUTPATIENT)
Dept: FAMILY MEDICINE | Facility: CLINIC | Age: 89
End: 2025-03-14
Payer: MEDICARE

## 2025-03-14 VITALS
DIASTOLIC BLOOD PRESSURE: 52 MMHG | OXYGEN SATURATION: 95 % | WEIGHT: 106.25 LBS | BODY MASS INDEX: 20.06 KG/M2 | HEIGHT: 61 IN | HEART RATE: 53 BPM | SYSTOLIC BLOOD PRESSURE: 138 MMHG

## 2025-03-14 DIAGNOSIS — N18.32 STAGE 3B CHRONIC KIDNEY DISEASE: ICD-10-CM

## 2025-03-14 DIAGNOSIS — E03.9 ACQUIRED HYPOTHYROIDISM: Primary | ICD-10-CM

## 2025-03-14 DIAGNOSIS — I10 ESSENTIAL HYPERTENSION: ICD-10-CM

## 2025-03-14 DIAGNOSIS — I77.9 CAROTID ARTERY DISEASE, UNSPECIFIED LATERALITY, UNSPECIFIED TYPE: ICD-10-CM

## 2025-03-14 DIAGNOSIS — M25.562 LEFT KNEE PAIN, UNSPECIFIED CHRONICITY: ICD-10-CM

## 2025-03-14 PROCEDURE — 99213 OFFICE O/P EST LOW 20 MIN: CPT | Mod: PBBFAC,PO | Performed by: FAMILY MEDICINE

## 2025-03-14 PROCEDURE — 99999 PR PBB SHADOW E&M-EST. PATIENT-LVL III: CPT | Mod: PBBFAC,,, | Performed by: FAMILY MEDICINE

## 2025-03-14 RX ORDER — LEVOTHYROXINE SODIUM 50 UG/1
50 TABLET ORAL
Qty: 90 TABLET | Refills: 3 | Status: SHIPPED | OUTPATIENT
Start: 2025-03-14 | End: 2026-03-14

## 2025-03-14 RX ORDER — DICLOFENAC SODIUM 10 MG/G
2 GEL TOPICAL 2 TIMES DAILY
Qty: 100 G | Refills: 5 | Status: SHIPPED | OUTPATIENT
Start: 2025-03-14

## 2025-03-14 NOTE — PROGRESS NOTES
Patient ID: Sobeida Mcclain is a 89 y.o. female.    Chief Complaint: Annual Exam    History of Present Illness    CHIEF COMPLAINT:  Patient presents today for follow up    ENDOCRINE:  She recently been diagnosed with hypothyroidism based on lab results.    MUSCULOSKELETAL:  She reports experiencing multiple aches throughout her body with predominant pain in the left knee.    CARDIOVASCULAR:  She is being followed by a cardiologist for peripheral vascular disease and coronary disease. She denies chest pain or shortness of breath. She has a scheduled left leg ultrasound later this month.    DYSPHAGIA:  She reports difficulty swallowing and declines EGD evaluation at this time.      ROS:  General: -fever, -chills, -fatigue, -weight gain, -weight loss  Eyes: -vision changes, -redness, -discharge  ENT: -ear pain, -nasal congestion, -sore throat  Cardiovascular: -chest pain, -palpitations, -lower extremity edema  Respiratory: -cough, -shortness of breath  Gastrointestinal: -abdominal pain, -nausea, -vomiting, -diarrhea, -constipation, -blood in stool, +difficulty swallowing  Genitourinary: -dysuria, -hematuria, -frequency  Musculoskeletal: +joint pain, -muscle pain, +body aches, +limb pain  Skin: -rash, -lesion  Neurological: -headache, -dizziness, -numbness, -tingling  Psychiatric: -anxiety, -depression, -sleep difficulty         Physical Exam    General: No acute distress. Well-developed. Well-nourished.  Eyes: EOMI. Sclerae anicteric.  HENT: Normocephalic. Atraumatic. Nares patent. Moist oral mucosa.  Ears: Bilateral TMs clear. Bilateral EACs clear.  Cardiovascular: Regular rate. Regular rhythm. No murmurs. No rubs. No gallops. Normal S1, S2.  Respiratory: Normal respiratory effort. Clear to auscultation bilaterally. No rales. No rhonchi. No wheezing.  Abdomen: Soft. Non-tender. Non-distended. Normoactive bowel sounds.  Musculoskeletal: No  obvious deformity.  Extremities: No lower extremity edema.  Neurological: Alert &  oriented x3. No slurred speech. Normal gait.  Psychiatric: Normal mood. Normal affect. Good insight. Good judgment.  Skin: Warm. Dry. No rash.         Assessment & Plan    IMPRESSION:  - Diagnosed hypothyroidism based on recent lab results.  - Considering peripheral vascular disease and coronary disease, being followed by cardiologist.  - Monitoring stage 3b CKD, which has been stable.  - Noted swallowing difficulties, recommended EGD for potential esophageal dilation, but patient declined.    STAGE 3B CHRONIC KIDNEY DISEASE:  - Patient's condition is stable.  - Will continue monitoring.    HYPOTHYROIDISM:  - Diagnosed hypothyroidism based on recent labs.  - Prescribed Levoxyl 50 mcg daily for management.  - Ordered TSH recheck in 6 weeks to assess response and will adjust medication dose if needed based on results.    CORONARY ARTERY DISEASE:  - Continued amiodarone, likely for dysrhythmia management, and amlodipine for well-managed blood pressure control.  - Patient reports no chest pain or shortness of breath and is being followed by a cardiologist for coronary disease.    PERIPHERAL VASCULAR DISEASE:  - Patient is being followed by a cardiologist for peripheral vascular disease.  - Ordered an ultrasound on the left leg later in the month.    DYSPHAGIA:  - Discussed swallowing difficulties and recommended an esophagogastroduodenoscopy (EGD) to potentially stretch out the esophagus.  - Patient declined EGD for now but instructed to contact the office if they reconsider.    LEFT KNEE PAIN:  - Patient is experiencing significant left knee pain.    GENERALIZED PAIN:  - Patient is experiencing multiple aches throughout the body.            Follow up in about 6 months (around 9/14/2025).    This note was generated with the assistance of ambient listening technology. Verbal consent was obtained by the patient and accompanying visitor(s) for the recording of patient appointment to facilitate this note. I attest to having  reviewed and edited the generated note for accuracy, though some syntax or spelling errors may persist. Please contact the author of this note for any clarification.

## 2025-07-24 ENCOUNTER — LAB VISIT (OUTPATIENT)
Dept: LAB | Facility: HOSPITAL | Age: 89
End: 2025-07-24
Attending: PHYSICIAN ASSISTANT
Payer: MEDICARE

## 2025-07-24 ENCOUNTER — OFFICE VISIT (OUTPATIENT)
Dept: FAMILY MEDICINE | Facility: CLINIC | Age: 89
End: 2025-07-24
Payer: MEDICARE

## 2025-07-24 VITALS
OXYGEN SATURATION: 95 % | TEMPERATURE: 97 F | BODY MASS INDEX: 19.98 KG/M2 | SYSTOLIC BLOOD PRESSURE: 140 MMHG | DIASTOLIC BLOOD PRESSURE: 60 MMHG | HEART RATE: 63 BPM | WEIGHT: 105.81 LBS | HEIGHT: 61 IN

## 2025-07-24 DIAGNOSIS — I10 ESSENTIAL HYPERTENSION: ICD-10-CM

## 2025-07-24 DIAGNOSIS — N17.9 AKI (ACUTE KIDNEY INJURY): ICD-10-CM

## 2025-07-24 DIAGNOSIS — I48.0 PAROXYSMAL ATRIAL FIBRILLATION: ICD-10-CM

## 2025-07-24 DIAGNOSIS — E03.9 ACQUIRED HYPOTHYROIDISM: ICD-10-CM

## 2025-07-24 DIAGNOSIS — N17.9 AKI (ACUTE KIDNEY INJURY): Primary | ICD-10-CM

## 2025-07-24 LAB
ABSOLUTE EOSINOPHIL (OHS): 0.37 K/UL
ABSOLUTE MONOCYTE (OHS): 0.7 K/UL (ref 0.3–1)
ABSOLUTE NEUTROPHIL COUNT (OHS): 4.2 K/UL (ref 1.8–7.7)
ALBUMIN SERPL BCP-MCNC: 3.4 G/DL (ref 3.5–5.2)
ALP SERPL-CCNC: 60 UNIT/L (ref 40–150)
ALT SERPL W/O P-5'-P-CCNC: <8 UNIT/L (ref 0–55)
ANION GAP (OHS): 7 MMOL/L (ref 8–16)
AST SERPL-CCNC: 22 UNIT/L (ref 0–50)
BASOPHILS # BLD AUTO: 0.04 K/UL
BASOPHILS NFR BLD AUTO: 0.6 %
BILIRUB SERPL-MCNC: 0.3 MG/DL (ref 0.1–1)
BUN SERPL-MCNC: 43 MG/DL (ref 8–23)
CALCIUM SERPL-MCNC: 9.4 MG/DL (ref 8.7–10.5)
CHLORIDE SERPL-SCNC: 106 MMOL/L (ref 95–110)
CO2 SERPL-SCNC: 22 MMOL/L (ref 23–29)
CREAT SERPL-MCNC: 2.9 MG/DL (ref 0.5–1.4)
ERYTHROCYTE [DISTWIDTH] IN BLOOD BY AUTOMATED COUNT: 14.4 % (ref 11.5–14.5)
GFR SERPLBLD CREATININE-BSD FMLA CKD-EPI: 15 ML/MIN/1.73/M2
GLUCOSE SERPL-MCNC: 98 MG/DL (ref 70–110)
HCT VFR BLD AUTO: 31.6 % (ref 37–48.5)
HGB BLD-MCNC: 10.1 GM/DL (ref 12–16)
IMM GRANULOCYTES # BLD AUTO: 0.01 K/UL (ref 0–0.04)
IMM GRANULOCYTES NFR BLD AUTO: 0.1 % (ref 0–0.5)
LYMPHOCYTES # BLD AUTO: 1.38 K/UL (ref 1–4.8)
MCH RBC QN AUTO: 31.6 PG (ref 27–31)
MCHC RBC AUTO-ENTMCNC: 32 G/DL (ref 32–36)
MCV RBC AUTO: 99 FL (ref 82–98)
NUCLEATED RBC (/100WBC) (OHS): 0 /100 WBC
PLATELET # BLD AUTO: 308 K/UL (ref 150–450)
PMV BLD AUTO: 10.3 FL (ref 9.2–12.9)
POTASSIUM SERPL-SCNC: 4.6 MMOL/L (ref 3.5–5.1)
PROT SERPL-MCNC: 7 GM/DL (ref 6–8.4)
RBC # BLD AUTO: 3.2 M/UL (ref 4–5.4)
RELATIVE EOSINOPHIL (OHS): 5.5 %
RELATIVE LYMPHOCYTE (OHS): 20.6 % (ref 18–48)
RELATIVE MONOCYTE (OHS): 10.4 % (ref 4–15)
RELATIVE NEUTROPHIL (OHS): 62.8 % (ref 38–73)
SODIUM SERPL-SCNC: 135 MMOL/L (ref 136–145)
WBC # BLD AUTO: 6.7 K/UL (ref 3.9–12.7)

## 2025-07-24 PROCEDURE — 1101F PT FALLS ASSESS-DOCD LE1/YR: CPT | Mod: CPTII,S$GLB,, | Performed by: PHYSICIAN ASSISTANT

## 2025-07-24 PROCEDURE — 85025 COMPLETE CBC W/AUTO DIFF WBC: CPT

## 2025-07-24 PROCEDURE — 3288F FALL RISK ASSESSMENT DOCD: CPT | Mod: CPTII,S$GLB,, | Performed by: PHYSICIAN ASSISTANT

## 2025-07-24 PROCEDURE — 36415 COLL VENOUS BLD VENIPUNCTURE: CPT | Mod: PO

## 2025-07-24 PROCEDURE — 1159F MED LIST DOCD IN RCRD: CPT | Mod: CPTII,S$GLB,, | Performed by: PHYSICIAN ASSISTANT

## 2025-07-24 PROCEDURE — 1160F RVW MEDS BY RX/DR IN RCRD: CPT | Mod: CPTII,S$GLB,, | Performed by: PHYSICIAN ASSISTANT

## 2025-07-24 PROCEDURE — 99999 PR PBB SHADOW E&M-EST. PATIENT-LVL V: CPT | Mod: PBBFAC,,, | Performed by: PHYSICIAN ASSISTANT

## 2025-07-24 PROCEDURE — 99214 OFFICE O/P EST MOD 30 MIN: CPT | Mod: S$GLB,,, | Performed by: PHYSICIAN ASSISTANT

## 2025-07-24 PROCEDURE — 80053 COMPREHEN METABOLIC PANEL: CPT

## 2025-07-24 RX ORDER — LEVOTHYROXINE SODIUM 25 UG/1
25 TABLET ORAL
COMMUNITY

## 2025-07-24 RX ORDER — HYDRALAZINE HYDROCHLORIDE 100 MG/1
100 TABLET, FILM COATED ORAL 3 TIMES DAILY
COMMUNITY
Start: 2025-07-18 | End: 2026-07-18

## 2025-07-24 RX ORDER — AMIODARONE HYDROCHLORIDE 100 MG/1
100 TABLET ORAL DAILY
COMMUNITY

## 2025-07-24 NOTE — PROGRESS NOTES
"Subjective     Patient ID: Sobeida Mcclain is a 89 y.o. female.    Chief Complaint: Hospital Follow Up      HPI      Pt is new to me, PCP Dr. Ames.     Pt is a 89 year old female with COPD, A-fib, HTN, CAD, hx of carotid endarterectomy, CKD3, hx of b12 def. She presents today for hospital follow up. Admitted at [facility] from 7/16-7/18.     "89-year-old female admitted for acute kidney injury and bradycardia.  On admission she was also diagnosed with acute cystitis without hematuria for which she was started on ceftriaxone.       Seen by Cardiology for bradycardia.  AV bethany blocking agents are on hold.  Of note a Zio patch has been ordered by PCP for evaluation of bradycardia.  Amlodipine dose has been increased for essential hypertension.  In addition she has been initiated on hydralazine.  Started on amiodarone to maintain normal sinus rhythm in light of her paroxysmal atrial fibrillation.     VIRGILIO likely from bradycardia mediated renal hypoperfusion.  This is improving but not back to baseline.  Urine culture revealed E coli; IV ceftriaxone transitioned to cefdinir.     Advised to follow up with PCP in 1 week and obtain BMP to monitor renal function.  Return precautions discussed with patient and daughter."    Pt is scheduled to see cardiology on 7/28/25. Her cardiologist is Dr. Recinos.   Finished cefdinir on 7/22. No dysuria, urinary frequency, hematuria, fever, chills. Drinking water and electrolyte beverages. Taking amiodarone 100mg daily. Declines home health. Does not monitor BP at home but plans to purchase cuff today.     Review of Systems   All other systems reviewed and are negative.         Objective     Physical Exam  Constitutional:       General: She is not in acute distress.     Appearance: Normal appearance. She is not ill-appearing, toxic-appearing or diaphoretic.   HENT:      Head: Normocephalic and atraumatic.   Cardiovascular:      Heart sounds: Normal heart sounds. No murmur heard.     No " friction rub. No gallop.   Pulmonary:      Effort: No respiratory distress.      Breath sounds: Normal breath sounds. No stridor. No wheezing, rhonchi or rales.   Chest:      Chest wall: No tenderness.   Abdominal:      General: Abdomen is flat. Bowel sounds are normal. There is no distension.      Palpations: Abdomen is soft. There is no mass.      Tenderness: There is no abdominal tenderness. There is no right CVA tenderness, left CVA tenderness, guarding or rebound.      Hernia: No hernia is present.   Musculoskeletal:      Right lower leg: No edema.      Left lower leg: No edema.   Neurological:      General: No focal deficit present.      Mental Status: She is alert and oriented to person, place, and time. Mental status is at baseline.   Psychiatric:         Mood and Affect: Mood normal.         Behavior: Behavior normal.         Thought Content: Thought content normal.         Judgment: Judgment normal.       1. VIRGILIO (acute kidney injury) (Primary)  - Comprehensive Metabolic Panel; Future  - CBC Auto Differential; Future    2. Acquired hypothyroidism  -continue synthroid    3. Essential hypertension  -slightly elevated today, encourage home monitoring    4. Paroxysmal atrial fibrillation  -continue amiodarone, baby aspirin and eliquis      RTC/ER precautions given. F/U with cardiology next week as scheduled, with pcp as scheduled for september    Alva Lema PA-C

## 2025-07-24 NOTE — PATIENT INSTRUCTIONS
A few reminders from today:    Labs today  Purchase BP cuff and monitor blood pressure twice daily  F/U as scheudled with cardiology  F/U with Dr. Ames in September      Follow up with me if needed.   Please go to ER/urgent care if symptoms persist/worsen, especially if after hours.     Do not hesitate to get in touch with me should you have any further questions.     Thank you for trusting me with your care!  I wish you health and happiness.    -Alva Lema PA-C

## 2025-07-25 ENCOUNTER — TELEPHONE (OUTPATIENT)
Dept: FAMILY MEDICINE | Facility: CLINIC | Age: 89
End: 2025-07-25
Payer: MEDICARE

## 2025-07-25 NOTE — TELEPHONE ENCOUNTER
Called pt with lab results. Needs to return to ER for ARF, needs urgent outpatient nephrology consult post discharge. Pt and daughter understand

## 2025-08-14 ENCOUNTER — OFFICE VISIT (OUTPATIENT)
Dept: NEPHROLOGY | Facility: CLINIC | Age: 89
End: 2025-08-14
Payer: MEDICARE

## 2025-08-14 ENCOUNTER — LAB VISIT (OUTPATIENT)
Dept: LAB | Facility: HOSPITAL | Age: 89
End: 2025-08-14
Attending: PHYSICIAN ASSISTANT
Payer: MEDICARE

## 2025-08-14 VITALS
HEART RATE: 66 BPM | SYSTOLIC BLOOD PRESSURE: 132 MMHG | OXYGEN SATURATION: 94 % | BODY MASS INDEX: 19.99 KG/M2 | HEIGHT: 61 IN | DIASTOLIC BLOOD PRESSURE: 52 MMHG

## 2025-08-14 DIAGNOSIS — N17.9 AKI (ACUTE KIDNEY INJURY): Primary | ICD-10-CM

## 2025-08-14 DIAGNOSIS — N17.9 AKI (ACUTE KIDNEY INJURY): ICD-10-CM

## 2025-08-14 DIAGNOSIS — N18.31 CHRONIC KIDNEY DISEASE, STAGE 3A: ICD-10-CM

## 2025-08-14 LAB
ALBUMIN SERPL BCP-MCNC: 3.5 G/DL (ref 3.5–5.2)
ANION GAP (OHS): 11 MMOL/L (ref 8–16)
BUN SERPL-MCNC: 27 MG/DL (ref 8–23)
CALCIUM SERPL-MCNC: 9 MG/DL (ref 8.7–10.5)
CHLORIDE SERPL-SCNC: 107 MMOL/L (ref 95–110)
CO2 SERPL-SCNC: 19 MMOL/L (ref 23–29)
CREAT SERPL-MCNC: 2.5 MG/DL (ref 0.5–1.4)
GFR SERPLBLD CREATININE-BSD FMLA CKD-EPI: 18 ML/MIN/1.73/M2
GLUCOSE SERPL-MCNC: 120 MG/DL (ref 70–110)
PHOSPHATE SERPL-MCNC: 3.6 MG/DL (ref 2.7–4.5)
POTASSIUM SERPL-SCNC: 4.2 MMOL/L (ref 3.5–5.1)
SODIUM SERPL-SCNC: 137 MMOL/L (ref 136–145)

## 2025-08-14 PROCEDURE — 80069 RENAL FUNCTION PANEL: CPT

## 2025-08-14 PROCEDURE — 36415 COLL VENOUS BLD VENIPUNCTURE: CPT | Mod: PO

## 2025-08-14 PROCEDURE — 99999 PR PBB SHADOW E&M-EST. PATIENT-LVL III: CPT | Mod: PBBFAC,,, | Performed by: INTERNAL MEDICINE

## 2025-08-19 ENCOUNTER — PATIENT OUTREACH (OUTPATIENT)
Dept: ADMINISTRATIVE | Facility: HOSPITAL | Age: 89
End: 2025-08-19
Payer: MEDICARE

## 2025-09-03 ENCOUNTER — TELEPHONE (OUTPATIENT)
Dept: FAMILY MEDICINE | Facility: CLINIC | Age: 89
End: 2025-09-03
Payer: MEDICARE